# Patient Record
Sex: MALE | Race: WHITE | NOT HISPANIC OR LATINO | Employment: OTHER | URBAN - NONMETROPOLITAN AREA
[De-identification: names, ages, dates, MRNs, and addresses within clinical notes are randomized per-mention and may not be internally consistent; named-entity substitution may affect disease eponyms.]

---

## 2021-05-21 PROCEDURE — 99282 EMERGENCY DEPT VISIT SF MDM: CPT

## 2021-05-22 ENCOUNTER — HOSPITAL ENCOUNTER (EMERGENCY)
Facility: HOSPITAL | Age: 81
Discharge: HOME | End: 2021-05-22
Attending: EMERGENCY MEDICINE | Admitting: EMERGENCY MEDICINE

## 2021-05-22 VITALS
HEIGHT: 69 IN | RESPIRATION RATE: 16 BRPM | DIASTOLIC BLOOD PRESSURE: 77 MMHG | WEIGHT: 211 LBS | TEMPERATURE: 98.8 F | OXYGEN SATURATION: 100 % | HEART RATE: 71 BPM | BODY MASS INDEX: 31.25 KG/M2 | SYSTOLIC BLOOD PRESSURE: 148 MMHG

## 2021-05-22 DIAGNOSIS — R04.0 EPISTAXIS: Primary | ICD-10-CM

## 2021-05-22 LAB
ALBUMIN SERPL-MCNC: 4.25 G/DL (ref 3.5–5.2)
ALBUMIN/GLOB SERPL: 1.3 G/DL
ALP SERPL-CCNC: 59 U/L (ref 39–117)
ALT SERPL W P-5'-P-CCNC: 10 U/L (ref 1–41)
ANION GAP SERPL CALCULATED.3IONS-SCNC: 9.9 MMOL/L (ref 5–15)
APTT PPP: 37.5 SECONDS (ref 25.6–35.3)
AST SERPL-CCNC: 16 U/L (ref 1–40)
BASOPHILS # BLD AUTO: 0.03 10*3/MM3 (ref 0–0.2)
BASOPHILS NFR BLD AUTO: 0.5 % (ref 0–1.5)
BILIRUB SERPL-MCNC: 0.3 MG/DL (ref 0–1.2)
BUN SERPL-MCNC: 23 MG/DL (ref 8–23)
BUN/CREAT SERPL: 15.4 (ref 7–25)
CALCIUM SPEC-SCNC: 9.5 MG/DL (ref 8.6–10.5)
CHLORIDE SERPL-SCNC: 98 MMOL/L (ref 98–107)
CO2 SERPL-SCNC: 24.1 MMOL/L (ref 22–29)
CREAT SERPL-MCNC: 1.49 MG/DL (ref 0.76–1.27)
DEPRECATED RDW RBC AUTO: 45 FL (ref 37–54)
EOSINOPHIL # BLD AUTO: 0.17 10*3/MM3 (ref 0–0.4)
EOSINOPHIL NFR BLD AUTO: 2.7 % (ref 0.3–6.2)
ERYTHROCYTE [DISTWIDTH] IN BLOOD BY AUTOMATED COUNT: 13.1 % (ref 12.3–15.4)
GFR SERPL CREATININE-BSD FRML MDRD: 45 ML/MIN/1.73
GLOBULIN UR ELPH-MCNC: 3.2 GM/DL
GLUCOSE SERPL-MCNC: 185 MG/DL (ref 65–99)
HCT VFR BLD AUTO: 35.4 % (ref 37.5–51)
HGB BLD-MCNC: 11.2 G/DL (ref 13–17.7)
IMM GRANULOCYTES # BLD AUTO: 0.03 10*3/MM3 (ref 0–0.05)
IMM GRANULOCYTES NFR BLD AUTO: 0.5 % (ref 0–0.5)
INR PPP: 1.23 (ref 0.9–1.1)
LYMPHOCYTES # BLD AUTO: 1.61 10*3/MM3 (ref 0.7–3.1)
LYMPHOCYTES NFR BLD AUTO: 25.1 % (ref 19.6–45.3)
MCH RBC QN AUTO: 29.6 PG (ref 26.6–33)
MCHC RBC AUTO-ENTMCNC: 31.6 G/DL (ref 31.5–35.7)
MCV RBC AUTO: 93.7 FL (ref 79–97)
MONOCYTES # BLD AUTO: 0.63 10*3/MM3 (ref 0.1–0.9)
MONOCYTES NFR BLD AUTO: 9.8 % (ref 5–12)
NEUTROPHILS NFR BLD AUTO: 3.94 10*3/MM3 (ref 1.7–7)
NEUTROPHILS NFR BLD AUTO: 61.4 % (ref 42.7–76)
NRBC BLD AUTO-RTO: 0 /100 WBC (ref 0–0.2)
PLATELET # BLD AUTO: 214 10*3/MM3 (ref 140–450)
PMV BLD AUTO: 9.1 FL (ref 6–12)
POTASSIUM SERPL-SCNC: 5.2 MMOL/L (ref 3.5–5.2)
PROT SERPL-MCNC: 7.4 G/DL (ref 6–8.5)
PROTHROMBIN TIME: 15.3 SECONDS (ref 11.9–14.1)
RBC # BLD AUTO: 3.78 10*6/MM3 (ref 4.14–5.8)
SODIUM SERPL-SCNC: 132 MMOL/L (ref 136–145)
WBC # BLD AUTO: 6.41 10*3/MM3 (ref 3.4–10.8)

## 2021-05-22 PROCEDURE — 85025 COMPLETE CBC W/AUTO DIFF WBC: CPT | Performed by: EMERGENCY MEDICINE

## 2021-05-22 PROCEDURE — 36415 COLL VENOUS BLD VENIPUNCTURE: CPT

## 2021-05-22 PROCEDURE — 85730 THROMBOPLASTIN TIME PARTIAL: CPT | Performed by: EMERGENCY MEDICINE

## 2021-05-22 PROCEDURE — 85610 PROTHROMBIN TIME: CPT | Performed by: EMERGENCY MEDICINE

## 2021-05-22 PROCEDURE — 80053 COMPREHEN METABOLIC PANEL: CPT | Performed by: EMERGENCY MEDICINE

## 2021-05-22 RX ADMIN — TRANEXAMIC ACID 500 MG: 100 INJECTION, SOLUTION INTRAVENOUS at 02:30

## 2021-05-22 NOTE — ED PROVIDER NOTES
Subjective     History provided by:  Patient   used: No    Nose Bleed  Location:  R nare  Severity:  Mild  Timing:  Constant  Progression:  Improving  Chronicity:  New  Context: anticoagulants    Context: not aspirin use, not BiPAP, not bleeding disorder, not CPAP, not drug use, not elevation change, not foreign body, not home oxygen, not hypertension, not nose picking, not recent infection, not thrombocytopenia, not trauma and not weather change    Relieved by:  Nothing  Worsened by:  Nothing  Ineffective treatments:  None tried  Associated symptoms: no blood in oropharynx, no congestion, no cough, no dizziness, no facial pain, no fever, no headaches, no sinus pain, no sneezing, no sore throat and no syncope    Risk factors: no alcohol use, no allergies, no change in medication, no frequent nosebleeds, no head and neck surgery, no head and neck tumor, no intranasal steroids, no liver disease, no radiation treatment, no recent chemotherapy, no recent nasal surgery and no sinus problems        Review of Systems   Constitutional: Negative for activity change, appetite change, chills, diaphoresis, fatigue and fever.   HENT: Positive for nosebleeds. Negative for congestion, ear pain, sinus pain, sneezing and sore throat.    Eyes: Negative for redness.   Respiratory: Negative for cough, chest tightness, shortness of breath and wheezing.    Cardiovascular: Negative for chest pain, palpitations, leg swelling and syncope.   Gastrointestinal: Negative for abdominal pain, diarrhea, nausea and vomiting.   Genitourinary: Negative for dysuria and urgency.   Musculoskeletal: Negative for arthralgias, back pain, myalgias and neck pain.   Skin: Negative for pallor, rash and wound.   Neurological: Negative for dizziness, speech difficulty, weakness and headaches.   Psychiatric/Behavioral: Negative for agitation, behavioral problems, confusion and decreased concentration.   All other systems reviewed and are  negative.      No past medical history on file.    No Known Allergies    No past surgical history on file.    No family history on file.    Social History     Socioeconomic History   • Marital status: Single     Spouse name: Not on file   • Number of children: Not on file   • Years of education: Not on file   • Highest education level: Not on file           Objective   Physical Exam  Vitals and nursing note reviewed.   Constitutional:       General: He is not in acute distress.     Appearance: Normal appearance. He is well-developed. He is not toxic-appearing or diaphoretic.   HENT:      Head: Normocephalic and atraumatic.      Right Ear: External ear normal.      Left Ear: External ear normal.      Nose:      Right Nostril: Epistaxis present.      Left Nostril: No epistaxis.      Comments: Mild bleeding noted to the right nare with a clot.     Mouth/Throat:      Pharynx: No oropharyngeal exudate.      Tonsils: No tonsillar exudate.   Eyes:      General: Lids are normal.      Conjunctiva/sclera: Conjunctivae normal.      Pupils: Pupils are equal, round, and reactive to light.   Neck:      Thyroid: No thyromegaly.   Cardiovascular:      Rate and Rhythm: Normal rate and regular rhythm.      Pulses: Normal pulses.      Heart sounds: Normal heart sounds, S1 normal and S2 normal.   Pulmonary:      Effort: Pulmonary effort is normal. No tachypnea or respiratory distress.      Breath sounds: Normal breath sounds. No decreased breath sounds, wheezing or rales.   Chest:      Chest wall: No tenderness.   Abdominal:      General: Bowel sounds are normal. There is no distension.      Palpations: Abdomen is soft.      Tenderness: There is no abdominal tenderness. There is no guarding or rebound.   Musculoskeletal:         General: No tenderness or deformity. Normal range of motion.      Cervical back: Full passive range of motion without pain, normal range of motion and neck supple.   Lymphadenopathy:      Cervical: No cervical  adenopathy.   Skin:     General: Skin is warm and dry.      Coloration: Skin is not pale.      Findings: No erythema or rash.   Neurological:      Mental Status: He is alert and oriented to person, place, and time.      GCS: GCS eye subscore is 4. GCS verbal subscore is 5. GCS motor subscore is 6.      Cranial Nerves: No cranial nerve deficit.      Sensory: No sensory deficit.   Psychiatric:         Speech: Speech normal.         Behavior: Behavior normal.         Thought Content: Thought content normal.         Judgment: Judgment normal.         Epistaxis Management    Date/Time: 5/26/2021 7:23 PM  Performed by: Vinicius Nunes MD  Authorized by: Vinicius Nunes MD     Consent:     Consent obtained:  Verbal and written    Consent given by:  Patient    Risks discussed:  Bleeding, infection, nasal injury and pain    Alternatives discussed:  No treatment  Universal protocol:     Procedure explained and questions answered to patient or proxy's satisfaction: yes      Relevant documents present and verified: yes      Test results available and properly labeled: yes      Imaging studies available: yes      Required blood products, implants, devices, and special equipment available: yes      Site/side marked: yes      Time out called: yes      Patient identity confirmed:  Verbally with patient and arm band  Anesthesia (see MAR for exact dosages):     Anesthesia method:  None  Procedure details:     Treatment site:  R anterior    Treatment method:  Anterior pack and thrombin    Treatment complexity:  Limited    Treatment episode: initial    Post-procedure details:     Assessment:  Bleeding stopped    Patient tolerance of procedure:  Tolerated well, no immediate complications               ED Course                                           MDM  Number of Diagnoses or Management Options  Epistaxis: new and requires workup     Amount and/or Complexity of Data Reviewed  Clinical lab tests: reviewed and  ordered  Review and summarize past medical records: yes  Independent visualization of images, tracings, or specimens: yes    Risk of Complications, Morbidity, and/or Mortality  Presenting problems: moderate  Diagnostic procedures: moderate  Management options: moderate    Patient Progress  Patient progress: stable      Final diagnoses:   Epistaxis       ED Disposition  ED Disposition     ED Disposition Condition Comment    Discharge Stable           [facility]  [address] [place] [id number] [phone]  Schedule an appointment as soon as possible for a visit in 1 day  EVALUATE         Medication List      No changes were made to your prescriptions during this visit.          Vinicius Nunes MD  05/22/21 0503       Vinicius Nunes MD  05/26/21 9693

## 2023-08-31 ENCOUNTER — HOSPITAL ENCOUNTER (EMERGENCY)
Facility: HOSPITAL | Age: 83
Discharge: OTHER INSTITUTION | End: 2023-09-01
Attending: STUDENT IN AN ORGANIZED HEALTH CARE EDUCATION/TRAINING PROGRAM
Payer: OTHER GOVERNMENT

## 2023-08-31 DIAGNOSIS — I50.9 ACUTE ON CHRONIC CONGESTIVE HEART FAILURE, UNSPECIFIED HEART FAILURE TYPE: ICD-10-CM

## 2023-08-31 DIAGNOSIS — J96.01 ACUTE HYPOXEMIC RESPIRATORY FAILURE: Primary | ICD-10-CM

## 2023-08-31 LAB
A-A DO2: 51.9 MMHG (ref 0–300)
ARTERIAL PATENCY WRIST A: POSITIVE
ATMOSPHERIC PRESS: 729 MMHG
BASE EXCESS BLDA CALC-SCNC: -1.3 MMOL/L (ref 0–2)
BASOPHILS # BLD AUTO: 0.02 10*3/MM3 (ref 0–0.2)
BASOPHILS NFR BLD AUTO: 0.2 % (ref 0–1.5)
BDY SITE: ABNORMAL
CO2 BLDA-SCNC: 24.7 MMOL/L (ref 22–33)
COHGB MFR BLD: 1.6 % (ref 0–5)
DEPRECATED RDW RBC AUTO: 51 FL (ref 37–54)
EOSINOPHIL # BLD AUTO: 0.06 10*3/MM3 (ref 0–0.4)
EOSINOPHIL NFR BLD AUTO: 0.6 % (ref 0.3–6.2)
ERYTHROCYTE [DISTWIDTH] IN BLOOD BY AUTOMATED COUNT: 14.5 % (ref 12.3–15.4)
HCO3 BLDA-SCNC: 23.6 MMOL/L (ref 20–26)
HCT VFR BLD AUTO: 32.6 % (ref 37.5–51)
HCT VFR BLD CALC: 31.4 % (ref 38–51)
HGB BLD-MCNC: 10.3 G/DL (ref 13–17.7)
HGB BLDA-MCNC: 10.2 G/DL (ref 14–18)
IMM GRANULOCYTES # BLD AUTO: 0.04 10*3/MM3 (ref 0–0.05)
IMM GRANULOCYTES NFR BLD AUTO: 0.4 % (ref 0–0.5)
INHALED O2 CONCENTRATION: 21 %
LYMPHOCYTES # BLD AUTO: 0.81 10*3/MM3 (ref 0.7–3.1)
LYMPHOCYTES NFR BLD AUTO: 7.9 % (ref 19.6–45.3)
Lab: ABNORMAL
Lab: ABNORMAL
MCH RBC QN AUTO: 30.6 PG (ref 26.6–33)
MCHC RBC AUTO-ENTMCNC: 31.6 G/DL (ref 31.5–35.7)
MCV RBC AUTO: 96.7 FL (ref 79–97)
METHGB BLD QL: 0.1 % (ref 0–3)
MODALITY: ABNORMAL
MONOCYTES # BLD AUTO: 0.59 10*3/MM3 (ref 0.1–0.9)
MONOCYTES NFR BLD AUTO: 5.7 % (ref 5–12)
NEUTROPHILS NFR BLD AUTO: 8.77 10*3/MM3 (ref 1.7–7)
NEUTROPHILS NFR BLD AUTO: 85.2 % (ref 42.7–76)
NOTIFIED BY: ABNORMAL
NOTIFIED WHO: ABNORMAL
NRBC BLD AUTO-RTO: 0 /100 WBC (ref 0–0.2)
OXYHGB MFR BLDV: 82.4 % (ref 94–99)
PCO2 BLDA: 38.9 MM HG (ref 35–45)
PCO2 TEMP ADJ BLD: ABNORMAL MM[HG]
PH BLDA: 7.39 PH UNITS (ref 7.35–7.45)
PH, TEMP CORRECTED: ABNORMAL
PLATELET # BLD AUTO: 231 10*3/MM3 (ref 140–450)
PMV BLD AUTO: 9.1 FL (ref 6–12)
PO2 BLDA: 47.3 MM HG (ref 83–108)
PO2 TEMP ADJ BLD: ABNORMAL MM[HG]
RBC # BLD AUTO: 3.37 10*6/MM3 (ref 4.14–5.8)
SAO2 % BLDCOA: 83.8 % (ref 94–99)
VENTILATOR MODE: ABNORMAL
WBC NRBC COR # BLD: 10.29 10*3/MM3 (ref 3.4–10.8)

## 2023-08-31 PROCEDURE — 82805 BLOOD GASES W/O2 SATURATION: CPT

## 2023-08-31 PROCEDURE — 93005 ELECTROCARDIOGRAM TRACING: CPT | Performed by: STUDENT IN AN ORGANIZED HEALTH CARE EDUCATION/TRAINING PROGRAM

## 2023-08-31 PROCEDURE — 36600 WITHDRAWAL OF ARTERIAL BLOOD: CPT

## 2023-08-31 PROCEDURE — 80053 COMPREHEN METABOLIC PANEL: CPT | Performed by: STUDENT IN AN ORGANIZED HEALTH CARE EDUCATION/TRAINING PROGRAM

## 2023-08-31 PROCEDURE — 85025 COMPLETE CBC W/AUTO DIFF WBC: CPT | Performed by: STUDENT IN AN ORGANIZED HEALTH CARE EDUCATION/TRAINING PROGRAM

## 2023-08-31 PROCEDURE — 84484 ASSAY OF TROPONIN QUANT: CPT | Performed by: STUDENT IN AN ORGANIZED HEALTH CARE EDUCATION/TRAINING PROGRAM

## 2023-08-31 PROCEDURE — 85610 PROTHROMBIN TIME: CPT | Performed by: STUDENT IN AN ORGANIZED HEALTH CARE EDUCATION/TRAINING PROGRAM

## 2023-08-31 PROCEDURE — 82550 ASSAY OF CK (CPK): CPT | Performed by: STUDENT IN AN ORGANIZED HEALTH CARE EDUCATION/TRAINING PROGRAM

## 2023-08-31 PROCEDURE — 83880 ASSAY OF NATRIURETIC PEPTIDE: CPT | Performed by: STUDENT IN AN ORGANIZED HEALTH CARE EDUCATION/TRAINING PROGRAM

## 2023-08-31 PROCEDURE — 82375 ASSAY CARBOXYHB QUANT: CPT

## 2023-08-31 PROCEDURE — 83605 ASSAY OF LACTIC ACID: CPT | Performed by: STUDENT IN AN ORGANIZED HEALTH CARE EDUCATION/TRAINING PROGRAM

## 2023-08-31 PROCEDURE — 99285 EMERGENCY DEPT VISIT HI MDM: CPT

## 2023-08-31 PROCEDURE — 83050 HGB METHEMOGLOBIN QUAN: CPT

## 2023-08-31 PROCEDURE — 36415 COLL VENOUS BLD VENIPUNCTURE: CPT

## 2023-08-31 PROCEDURE — 94799 UNLISTED PULMONARY SVC/PX: CPT

## 2023-08-31 PROCEDURE — 86140 C-REACTIVE PROTEIN: CPT | Performed by: STUDENT IN AN ORGANIZED HEALTH CARE EDUCATION/TRAINING PROGRAM

## 2023-08-31 RX ORDER — SODIUM CHLORIDE 0.9 % (FLUSH) 0.9 %
10 SYRINGE (ML) INJECTION AS NEEDED
Status: DISCONTINUED | OUTPATIENT
Start: 2023-08-31 | End: 2023-09-01 | Stop reason: HOSPADM

## 2023-09-01 ENCOUNTER — APPOINTMENT (OUTPATIENT)
Dept: CT IMAGING | Facility: HOSPITAL | Age: 83
End: 2023-09-01
Payer: OTHER GOVERNMENT

## 2023-09-01 ENCOUNTER — APPOINTMENT (OUTPATIENT)
Dept: GENERAL RADIOLOGY | Facility: HOSPITAL | Age: 83
End: 2023-09-01
Payer: OTHER GOVERNMENT

## 2023-09-01 VITALS
RESPIRATION RATE: 24 BRPM | HEART RATE: 94 BPM | WEIGHT: 220 LBS | BODY MASS INDEX: 32.58 KG/M2 | TEMPERATURE: 98.7 F | SYSTOLIC BLOOD PRESSURE: 123 MMHG | OXYGEN SATURATION: 95 % | HEIGHT: 69 IN | DIASTOLIC BLOOD PRESSURE: 85 MMHG

## 2023-09-01 LAB
ALBUMIN SERPL-MCNC: 4.2 G/DL (ref 3.5–5.2)
ALBUMIN/GLOB SERPL: 1.1 G/DL
ALP SERPL-CCNC: 81 U/L (ref 39–117)
ALT SERPL W P-5'-P-CCNC: 11 U/L (ref 1–41)
AMPHET+METHAMPHET UR QL: NEGATIVE
AMPHETAMINES UR QL: NEGATIVE
ANION GAP SERPL CALCULATED.3IONS-SCNC: 13.1 MMOL/L (ref 5–15)
AST SERPL-CCNC: 16 U/L (ref 1–40)
BARBITURATES UR QL SCN: NEGATIVE
BENZODIAZ UR QL SCN: NEGATIVE
BILIRUB SERPL-MCNC: 0.6 MG/DL (ref 0–1.2)
BUN SERPL-MCNC: 13 MG/DL (ref 8–23)
BUN/CREAT SERPL: 15.7 (ref 7–25)
BUPRENORPHINE SERPL-MCNC: NEGATIVE NG/ML
CALCIUM SPEC-SCNC: 9.2 MG/DL (ref 8.6–10.5)
CANNABINOIDS SERPL QL: NEGATIVE
CHLORIDE SERPL-SCNC: 98 MMOL/L (ref 98–107)
CK SERPL-CCNC: 35 U/L (ref 20–200)
CO2 SERPL-SCNC: 22.9 MMOL/L (ref 22–29)
COCAINE UR QL: NEGATIVE
CREAT SERPL-MCNC: 0.83 MG/DL (ref 0.76–1.27)
CRP SERPL-MCNC: 6.03 MG/DL (ref 0–0.5)
D-LACTATE SERPL-SCNC: 1.4 MMOL/L (ref 0.5–2)
EGFRCR SERPLBLD CKD-EPI 2021: 87.4 ML/MIN/1.73
FENTANYL UR-MCNC: NEGATIVE NG/ML
GLOBULIN UR ELPH-MCNC: 4 GM/DL
GLUCOSE SERPL-MCNC: 281 MG/DL (ref 65–99)
HOLD SPECIMEN: NORMAL
HOLD SPECIMEN: NORMAL
INR PPP: 1.2 (ref 0.9–1.1)
METHADONE UR QL SCN: NEGATIVE
NT-PROBNP SERPL-MCNC: 2818 PG/ML (ref 0–1800)
OPIATES UR QL: NEGATIVE
OXYCODONE UR QL SCN: NEGATIVE
PCP UR QL SCN: NEGATIVE
POTASSIUM SERPL-SCNC: 4.6 MMOL/L (ref 3.5–5.2)
PROCALCITONIN SERPL-MCNC: 0.03 NG/ML (ref 0–0.25)
PROPOXYPH UR QL: NEGATIVE
PROT SERPL-MCNC: 8.2 G/DL (ref 6–8.5)
PROTHROMBIN TIME: 15.8 SECONDS (ref 12.1–14.7)
QT INTERVAL: 404 MS
QTC INTERVAL: 510 MS
SARS-COV-2 RNA RESP QL NAA+PROBE: NOT DETECTED
SODIUM SERPL-SCNC: 134 MMOL/L (ref 136–145)
TRICYCLICS UR QL SCN: NEGATIVE
TROPONIN T SERPL HS-MCNC: 9 NG/L
WHOLE BLOOD HOLD COAG: NORMAL
WHOLE BLOOD HOLD SPECIMEN: NORMAL

## 2023-09-01 PROCEDURE — 96365 THER/PROPH/DIAG IV INF INIT: CPT

## 2023-09-01 PROCEDURE — 25510000001 IOPAMIDOL 61 % SOLUTION: Performed by: STUDENT IN AN ORGANIZED HEALTH CARE EDUCATION/TRAINING PROGRAM

## 2023-09-01 PROCEDURE — 25010000002 FUROSEMIDE PER 20 MG: Performed by: STUDENT IN AN ORGANIZED HEALTH CARE EDUCATION/TRAINING PROGRAM

## 2023-09-01 PROCEDURE — 87635 SARS-COV-2 COVID-19 AMP PRB: CPT | Performed by: STUDENT IN AN ORGANIZED HEALTH CARE EDUCATION/TRAINING PROGRAM

## 2023-09-01 PROCEDURE — 84145 PROCALCITONIN (PCT): CPT | Performed by: STUDENT IN AN ORGANIZED HEALTH CARE EDUCATION/TRAINING PROGRAM

## 2023-09-01 PROCEDURE — 96375 TX/PRO/DX INJ NEW DRUG ADDON: CPT

## 2023-09-01 PROCEDURE — 71275 CT ANGIOGRAPHY CHEST: CPT

## 2023-09-01 PROCEDURE — 87040 BLOOD CULTURE FOR BACTERIA: CPT | Performed by: STUDENT IN AN ORGANIZED HEALTH CARE EDUCATION/TRAINING PROGRAM

## 2023-09-01 PROCEDURE — 71045 X-RAY EXAM CHEST 1 VIEW: CPT

## 2023-09-01 PROCEDURE — 25010000002 PIPERACILLIN SOD-TAZOBACTAM PER 1 G: Performed by: STUDENT IN AN ORGANIZED HEALTH CARE EDUCATION/TRAINING PROGRAM

## 2023-09-01 PROCEDURE — 80307 DRUG TEST PRSMV CHEM ANLYZR: CPT | Performed by: STUDENT IN AN ORGANIZED HEALTH CARE EDUCATION/TRAINING PROGRAM

## 2023-09-01 PROCEDURE — 25010000002 VANCOMYCIN 5 G RECONSTITUTED SOLUTION: Performed by: STUDENT IN AN ORGANIZED HEALTH CARE EDUCATION/TRAINING PROGRAM

## 2023-09-01 PROCEDURE — 96367 TX/PROPH/DG ADDL SEQ IV INF: CPT

## 2023-09-01 RX ORDER — FUROSEMIDE 10 MG/ML
80 INJECTION INTRAMUSCULAR; INTRAVENOUS ONCE
Status: COMPLETED | OUTPATIENT
Start: 2023-09-01 | End: 2023-09-01

## 2023-09-01 RX ORDER — VANCOMYCIN 2 GRAM/500 ML IN 0.9 % SODIUM CHLORIDE INTRAVENOUS
20 ONCE
Status: COMPLETED | OUTPATIENT
Start: 2023-09-01 | End: 2023-09-01

## 2023-09-01 RX ADMIN — FUROSEMIDE 80 MG: 10 INJECTION, SOLUTION INTRAMUSCULAR; INTRAVENOUS at 00:27

## 2023-09-01 RX ADMIN — VANCOMYCIN HYDROCHLORIDE 2000 MG: 5 INJECTION, POWDER, LYOPHILIZED, FOR SOLUTION INTRAVENOUS at 02:01

## 2023-09-01 RX ADMIN — IOPAMIDOL 80 ML: 612 INJECTION, SOLUTION INTRAVENOUS at 00:54

## 2023-09-01 RX ADMIN — PIPERACILLIN SODIUM AND TAZOBACTAM SODIUM 4.5 G: 4; .5 INJECTION, POWDER, LYOPHILIZED, FOR SOLUTION INTRAVENOUS at 01:31

## 2023-09-01 NOTE — ED NOTES
Received call from [facility] with Bed assignment. Patient going to Room 573 on the 5th floor.
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
## 2023-09-01 NOTE — ED NOTES
Called [facility] requesting [facility]. Dr. Cuevas speaking with transferring  physician at this time

## 2023-09-01 NOTE — ED NOTES
Called [facility] to requesting [employer] truck to transport patent to [facility] in [facility] at this time. Accepted transfer.

## 2023-09-01 NOTE — ED NOTES
Report phoned to MARTINEZ Garcias at [facility]. Patient for admission to 5th floor, Room 573.

## 2023-09-06 LAB
BACTERIA SPEC AEROBE CULT: NORMAL
BACTERIA SPEC AEROBE CULT: NORMAL

## 2024-08-26 ENCOUNTER — APPOINTMENT (OUTPATIENT)
Dept: CT IMAGING | Facility: HOSPITAL | Age: 84
End: 2024-08-26
Payer: OTHER GOVERNMENT

## 2024-08-26 ENCOUNTER — HOSPITAL ENCOUNTER (EMERGENCY)
Facility: HOSPITAL | Age: 84
Discharge: OTHER INSTITUTION | End: 2024-08-26
Attending: STUDENT IN AN ORGANIZED HEALTH CARE EDUCATION/TRAINING PROGRAM
Payer: OTHER GOVERNMENT

## 2024-08-26 ENCOUNTER — APPOINTMENT (OUTPATIENT)
Dept: GENERAL RADIOLOGY | Facility: HOSPITAL | Age: 84
End: 2024-08-26
Payer: OTHER GOVERNMENT

## 2024-08-26 VITALS
WEIGHT: 200 LBS | DIASTOLIC BLOOD PRESSURE: 89 MMHG | BODY MASS INDEX: 29.62 KG/M2 | SYSTOLIC BLOOD PRESSURE: 153 MMHG | HEART RATE: 89 BPM | OXYGEN SATURATION: 93 % | HEIGHT: 69 IN | TEMPERATURE: 97.8 F | RESPIRATION RATE: 20 BRPM

## 2024-08-26 DIAGNOSIS — R91.8 LUNG MASS: ICD-10-CM

## 2024-08-26 DIAGNOSIS — J90 PLEURAL EFFUSION, LEFT: ICD-10-CM

## 2024-08-26 DIAGNOSIS — U07.1 COVID-19: Primary | ICD-10-CM

## 2024-08-26 DIAGNOSIS — E87.20 LACTIC ACIDOSIS: ICD-10-CM

## 2024-08-26 DIAGNOSIS — J18.9 PNEUMONIA OF LEFT LOWER LOBE DUE TO INFECTIOUS ORGANISM: ICD-10-CM

## 2024-08-26 LAB
A-A DO2: 36.1 MMHG (ref 0–300)
ALBUMIN SERPL-MCNC: 3.2 G/DL (ref 3.5–5.2)
ALBUMIN/GLOB SERPL: 0.7 G/DL
ALP SERPL-CCNC: 104 U/L (ref 39–117)
ALT SERPL W P-5'-P-CCNC: 51 U/L (ref 1–41)
ANION GAP SERPL CALCULATED.3IONS-SCNC: 15.5 MMOL/L (ref 5–15)
ARTERIAL PATENCY WRIST A: ABNORMAL
AST SERPL-CCNC: 70 U/L (ref 1–40)
ATMOSPHERIC PRESS: 731 MMHG
BASE EXCESS BLDA CALC-SCNC: -2.4 MMOL/L (ref 0–2)
BASOPHILS # BLD AUTO: 0.01 10*3/MM3 (ref 0–0.2)
BASOPHILS NFR BLD AUTO: 0.1 % (ref 0–1.5)
BDY SITE: ABNORMAL
BILIRUB SERPL-MCNC: 0.4 MG/DL (ref 0–1.2)
BUN SERPL-MCNC: 17 MG/DL (ref 8–23)
BUN/CREAT SERPL: 18.5 (ref 7–25)
CALCIUM SPEC-SCNC: 8.8 MG/DL (ref 8.6–10.5)
CHLORIDE SERPL-SCNC: 89 MMOL/L (ref 98–107)
CO2 BLDA-SCNC: 23 MMOL/L (ref 22–33)
CO2 SERPL-SCNC: 20.5 MMOL/L (ref 22–29)
COHGB MFR BLD: 1.4 % (ref 0–5)
CREAT SERPL-MCNC: 0.92 MG/DL (ref 0.76–1.27)
CRP SERPL-MCNC: 14.47 MG/DL (ref 0–0.5)
D-LACTATE SERPL-SCNC: 4.2 MMOL/L (ref 0.5–2)
D-LACTATE SERPL-SCNC: 4.5 MMOL/L (ref 0.5–2)
DEPRECATED RDW RBC AUTO: 61.5 FL (ref 37–54)
EGFRCR SERPLBLD CKD-EPI 2021: 82.5 ML/MIN/1.73
EOSINOPHIL # BLD AUTO: 0.01 10*3/MM3 (ref 0–0.4)
EOSINOPHIL NFR BLD AUTO: 0.1 % (ref 0.3–6.2)
ERYTHROCYTE [DISTWIDTH] IN BLOOD BY AUTOMATED COUNT: 19.5 % (ref 12.3–15.4)
FLUAV RNA RESP QL NAA+PROBE: NOT DETECTED
FLUBV RNA RESP QL NAA+PROBE: NOT DETECTED
GLOBULIN UR ELPH-MCNC: 4.3 GM/DL
GLUCOSE SERPL-MCNC: 363 MG/DL (ref 65–99)
HAV IGM SERPL QL IA: NORMAL
HBV CORE IGM SERPL QL IA: NORMAL
HBV SURFACE AG SERPL QL IA: NORMAL
HCO3 BLDA-SCNC: 21.9 MMOL/L (ref 20–26)
HCT VFR BLD AUTO: 28 % (ref 37.5–51)
HCT VFR BLD CALC: 26.9 % (ref 38–51)
HCV AB SER QL: NORMAL
HGB BLD-MCNC: 8.4 G/DL (ref 13–17.7)
HGB BLDA-MCNC: 8.8 G/DL (ref 14–18)
HOLD SPECIMEN: NORMAL
HOLD SPECIMEN: NORMAL
IMM GRANULOCYTES # BLD AUTO: 0.1 10*3/MM3 (ref 0–0.05)
IMM GRANULOCYTES NFR BLD AUTO: 0.9 % (ref 0–0.5)
INHALED O2 CONCENTRATION: 21 %
INR PPP: 1.16 (ref 0.9–1.1)
LDH SERPL-CCNC: 258 U/L (ref 135–225)
LYMPHOCYTES # BLD AUTO: 0.26 10*3/MM3 (ref 0.7–3.1)
LYMPHOCYTES NFR BLD AUTO: 2.3 % (ref 19.6–45.3)
Lab: ABNORMAL
MCH RBC QN AUTO: 26.2 PG (ref 26.6–33)
MCHC RBC AUTO-ENTMCNC: 30 G/DL (ref 31.5–35.7)
MCV RBC AUTO: 87.2 FL (ref 79–97)
METHGB BLD QL: 0.2 % (ref 0–3)
MODALITY: ABNORMAL
MONOCYTES # BLD AUTO: 0.74 10*3/MM3 (ref 0.1–0.9)
MONOCYTES NFR BLD AUTO: 6.4 % (ref 5–12)
NEUTROPHILS NFR BLD AUTO: 10.4 10*3/MM3 (ref 1.7–7)
NEUTROPHILS NFR BLD AUTO: 90.2 % (ref 42.7–76)
NRBC BLD AUTO-RTO: 0 /100 WBC (ref 0–0.2)
NT-PROBNP SERPL-MCNC: 9644 PG/ML (ref 0–1800)
OXYHGB MFR BLDV: 91.7 % (ref 94–99)
PCO2 BLDA: 35.1 MM HG (ref 35–45)
PCO2 TEMP ADJ BLD: ABNORMAL MM[HG]
PH BLDA: 7.4 PH UNITS (ref 7.35–7.45)
PH, TEMP CORRECTED: ABNORMAL
PLATELET # BLD AUTO: 287 10*3/MM3 (ref 140–450)
PMV BLD AUTO: 8.7 FL (ref 6–12)
PO2 BLDA: 67.9 MM HG (ref 83–108)
PO2 TEMP ADJ BLD: ABNORMAL MM[HG]
POTASSIUM SERPL-SCNC: 4.7 MMOL/L (ref 3.5–5.2)
PROCALCITONIN SERPL-MCNC: 0.15 NG/ML (ref 0–0.25)
PROT SERPL-MCNC: 7.5 G/DL (ref 6–8.5)
PROTHROMBIN TIME: 14.9 SECONDS (ref 12.1–14.7)
QT INTERVAL: 392 MS
QTC INTERVAL: 510 MS
RBC # BLD AUTO: 3.21 10*6/MM3 (ref 4.14–5.8)
SAO2 % BLDCOA: 93.2 % (ref 94–99)
SARS-COV-2 RNA RESP QL NAA+PROBE: DETECTED
SODIUM SERPL-SCNC: 125 MMOL/L (ref 136–145)
TROPONIN T SERPL HS-MCNC: 29 NG/L
URATE SERPL-MCNC: 3.6 MG/DL (ref 3.4–7)
VENTILATOR MODE: ABNORMAL
WBC NRBC COR # BLD AUTO: 11.52 10*3/MM3 (ref 3.4–10.8)
WHOLE BLOOD HOLD COAG: NORMAL
WHOLE BLOOD HOLD SPECIMEN: NORMAL

## 2024-08-26 PROCEDURE — 71275 CT ANGIOGRAPHY CHEST: CPT | Performed by: RADIOLOGY

## 2024-08-26 PROCEDURE — 87636 SARSCOV2 & INF A&B AMP PRB: CPT | Performed by: STUDENT IN AN ORGANIZED HEALTH CARE EDUCATION/TRAINING PROGRAM

## 2024-08-26 PROCEDURE — 87040 BLOOD CULTURE FOR BACTERIA: CPT | Performed by: STUDENT IN AN ORGANIZED HEALTH CARE EDUCATION/TRAINING PROGRAM

## 2024-08-26 PROCEDURE — 99285 EMERGENCY DEPT VISIT HI MDM: CPT

## 2024-08-26 PROCEDURE — 25510000001 IOPAMIDOL PER 1 ML: Performed by: STUDENT IN AN ORGANIZED HEALTH CARE EDUCATION/TRAINING PROGRAM

## 2024-08-26 PROCEDURE — 96365 THER/PROPH/DIAG IV INF INIT: CPT

## 2024-08-26 PROCEDURE — 36600 WITHDRAWAL OF ARTERIAL BLOOD: CPT

## 2024-08-26 PROCEDURE — 36415 COLL VENOUS BLD VENIPUNCTURE: CPT

## 2024-08-26 PROCEDURE — 83880 ASSAY OF NATRIURETIC PEPTIDE: CPT | Performed by: STUDENT IN AN ORGANIZED HEALTH CARE EDUCATION/TRAINING PROGRAM

## 2024-08-26 PROCEDURE — 80053 COMPREHEN METABOLIC PANEL: CPT | Performed by: STUDENT IN AN ORGANIZED HEALTH CARE EDUCATION/TRAINING PROGRAM

## 2024-08-26 PROCEDURE — 84145 PROCALCITONIN (PCT): CPT | Performed by: STUDENT IN AN ORGANIZED HEALTH CARE EDUCATION/TRAINING PROGRAM

## 2024-08-26 PROCEDURE — 71045 X-RAY EXAM CHEST 1 VIEW: CPT

## 2024-08-26 PROCEDURE — 86140 C-REACTIVE PROTEIN: CPT | Performed by: STUDENT IN AN ORGANIZED HEALTH CARE EDUCATION/TRAINING PROGRAM

## 2024-08-26 PROCEDURE — 84550 ASSAY OF BLOOD/URIC ACID: CPT | Performed by: STUDENT IN AN ORGANIZED HEALTH CARE EDUCATION/TRAINING PROGRAM

## 2024-08-26 PROCEDURE — 71275 CT ANGIOGRAPHY CHEST: CPT

## 2024-08-26 PROCEDURE — 83615 LACTATE (LD) (LDH) ENZYME: CPT | Performed by: STUDENT IN AN ORGANIZED HEALTH CARE EDUCATION/TRAINING PROGRAM

## 2024-08-26 PROCEDURE — 82805 BLOOD GASES W/O2 SATURATION: CPT

## 2024-08-26 PROCEDURE — 86606 ASPERGILLUS ANTIBODY: CPT | Performed by: STUDENT IN AN ORGANIZED HEALTH CARE EDUCATION/TRAINING PROGRAM

## 2024-08-26 PROCEDURE — 71045 X-RAY EXAM CHEST 1 VIEW: CPT | Performed by: RADIOLOGY

## 2024-08-26 PROCEDURE — 82375 ASSAY CARBOXYHB QUANT: CPT

## 2024-08-26 PROCEDURE — 83605 ASSAY OF LACTIC ACID: CPT | Performed by: STUDENT IN AN ORGANIZED HEALTH CARE EDUCATION/TRAINING PROGRAM

## 2024-08-26 PROCEDURE — 96375 TX/PRO/DX INJ NEW DRUG ADDON: CPT

## 2024-08-26 PROCEDURE — 80074 ACUTE HEPATITIS PANEL: CPT | Performed by: STUDENT IN AN ORGANIZED HEALTH CARE EDUCATION/TRAINING PROGRAM

## 2024-08-26 PROCEDURE — 25010000002 DEXAMETHASONE SODIUM PHOSPHATE 10 MG/ML SOLUTION: Performed by: STUDENT IN AN ORGANIZED HEALTH CARE EDUCATION/TRAINING PROGRAM

## 2024-08-26 PROCEDURE — 86480 TB TEST CELL IMMUN MEASURE: CPT | Performed by: STUDENT IN AN ORGANIZED HEALTH CARE EDUCATION/TRAINING PROGRAM

## 2024-08-26 PROCEDURE — 85610 PROTHROMBIN TIME: CPT | Performed by: STUDENT IN AN ORGANIZED HEALTH CARE EDUCATION/TRAINING PROGRAM

## 2024-08-26 PROCEDURE — 25010000002 CEFTRIAXONE PER 250 MG: Performed by: STUDENT IN AN ORGANIZED HEALTH CARE EDUCATION/TRAINING PROGRAM

## 2024-08-26 PROCEDURE — 84484 ASSAY OF TROPONIN QUANT: CPT | Performed by: STUDENT IN AN ORGANIZED HEALTH CARE EDUCATION/TRAINING PROGRAM

## 2024-08-26 PROCEDURE — 85025 COMPLETE CBC W/AUTO DIFF WBC: CPT | Performed by: STUDENT IN AN ORGANIZED HEALTH CARE EDUCATION/TRAINING PROGRAM

## 2024-08-26 PROCEDURE — 83050 HGB METHEMOGLOBIN QUAN: CPT

## 2024-08-26 PROCEDURE — 93005 ELECTROCARDIOGRAM TRACING: CPT | Performed by: STUDENT IN AN ORGANIZED HEALTH CARE EDUCATION/TRAINING PROGRAM

## 2024-08-26 RX ORDER — SODIUM CHLORIDE 0.9 % (FLUSH) 0.9 %
10 SYRINGE (ML) INJECTION AS NEEDED
Status: DISCONTINUED | OUTPATIENT
Start: 2024-08-26 | End: 2024-08-26 | Stop reason: HOSPADM

## 2024-08-26 RX ORDER — DOXYCYCLINE 100 MG/1
100 CAPSULE ORAL ONCE
Status: COMPLETED | OUTPATIENT
Start: 2024-08-26 | End: 2024-08-26

## 2024-08-26 RX ORDER — OXYCODONE AND ACETAMINOPHEN 5; 325 MG/1; MG/1
1 TABLET ORAL ONCE
Status: COMPLETED | OUTPATIENT
Start: 2024-08-26 | End: 2024-08-26

## 2024-08-26 RX ORDER — DEXAMETHASONE SODIUM PHOSPHATE 10 MG/ML
10 INJECTION, SOLUTION INTRAMUSCULAR; INTRAVENOUS ONCE
Status: COMPLETED | OUTPATIENT
Start: 2024-08-26 | End: 2024-08-26

## 2024-08-26 RX ORDER — IOPAMIDOL 755 MG/ML
100 INJECTION, SOLUTION INTRAVASCULAR
Status: COMPLETED | OUTPATIENT
Start: 2024-08-26 | End: 2024-08-26

## 2024-08-26 RX ADMIN — IOPAMIDOL 90 ML: 755 INJECTION, SOLUTION INTRAVENOUS at 16:43

## 2024-08-26 RX ADMIN — SODIUM CHLORIDE 1000 MG: 9 INJECTION, SOLUTION INTRAVENOUS at 16:15

## 2024-08-26 RX ADMIN — NIRMATRELVIR AND RITONAVIR 3 TABLET: KIT at 20:04

## 2024-08-26 RX ADMIN — DEXAMETHASONE SODIUM PHOSPHATE 10 MG: 10 INJECTION INTRAMUSCULAR; INTRAVENOUS at 16:15

## 2024-08-26 RX ADMIN — DOXYCYCLINE 100 MG: 100 CAPSULE ORAL at 16:15

## 2024-08-26 RX ADMIN — OXYCODONE HYDROCHLORIDE AND ACETAMINOPHEN 1 TABLET: 5; 325 TABLET ORAL at 18:21

## 2024-08-26 NOTE — ED NOTES
[facility] said [facility] run ahead of us but they would be next on the list. Made Lead aware

## 2024-08-26 NOTE — ED PROVIDER NOTES
Subjective   History of Present Illness  Patient is an 84-year-old male with history significant for skin cancer of the face/nose, A-fib on Eliquis (last dose Saturday), and known left lobe pneumonia/lesion who comes to the ER with reports of shortness of breath and weakness.  [facility] and [facility] with biopsy tomorrow.  He reports fatigue, lack of appetite over the past week.  He reports nonproductive cough and occasional nausea.  He thinks he has lost weight but is not sure.  He notes lower extremity edema that is been worse over the past 3 weeks.  He notes a raspy voice has been present since the end of July.  He used to smoke but stopped about 15 years ago.  Denies any fevers or chills or other symptoms.      Review of Systems   Constitutional:  Positive for fatigue. Negative for chills and fever.   HENT:  Positive for voice change. Negative for congestion, sinus pain and sore throat.    Respiratory:  Positive for cough and shortness of breath. Negative for chest tightness and wheezing.    Cardiovascular:  Positive for leg swelling. Negative for chest pain and palpitations.   Gastrointestinal:  Negative for abdominal pain, constipation, diarrhea, nausea and vomiting.   Genitourinary:  Negative for dysuria, frequency, hematuria and urgency.   Musculoskeletal:  Negative for arthralgias and myalgias.   Neurological:  Positive for weakness. Negative for dizziness, numbness and headaches.   Psychiatric/Behavioral:  Negative for confusion.        No past medical history on file.    No Known Allergies    No past surgical history on file.    No family history on file.    Social History     Socioeconomic History    Marital status: Single           Objective   Physical Exam  Vitals and nursing note reviewed.   Constitutional:       General: He is not in acute distress.     Appearance: He is well-developed and normal weight. He is not ill-appearing.   HENT:       Head: Normocephalic.      Comments: Deformity of the nasal bridge from prior skin cancer     Mouth/Throat:      Mouth: Mucous membranes are moist.      Pharynx: Oropharynx is clear.   Eyes:      Extraocular Movements: Extraocular movements intact.      Pupils: Pupils are equal, round, and reactive to light.   Neck:      Vascular: No JVD.   Cardiovascular:      Rate and Rhythm: Normal rate and regular rhythm.      Heart sounds: No murmur heard.     No friction rub. No gallop.   Pulmonary:      Effort: Pulmonary effort is normal. No tachypnea.      Breath sounds: Normal breath sounds. Examination of the right-upper field reveals wheezing. Examination of the left-upper field reveals wheezing. Examination of the right-middle field reveals wheezing. Examination of the left-middle field reveals wheezing. Examination of the left-lower field reveals decreased breath sounds. No decreased breath sounds, wheezing, rhonchi or rales.   Abdominal:      General: Bowel sounds are normal.      Palpations: Abdomen is soft.   Musculoskeletal:         General: Normal range of motion.      Cervical back: Normal range of motion and neck supple.      Right lower leg: Edema present.      Left lower leg: Edema present.   Skin:     General: Skin is warm and dry.      Capillary Refill: Capillary refill takes less than 2 seconds.   Neurological:      General: No focal deficit present.      Mental Status: He is alert and oriented to person, place, and time.   Psychiatric:         Mood and Affect: Mood normal.         Behavior: Behavior normal.         Procedures           ED Course  ED Course as of 08/26/24 1820   Mon Aug 26, 2024   1510 ECG 12 Lead Dyspnea  Atrial fibrillation, rate 102, QTc 510, no acute ST or T wave changes [CW]   1759 ECG 12 Lead Dyspnea [CW]      ED Course User Index  [CW] Min Cuevas, DO                                             Medical Decision Making  --On arrival, patient hemodynamically stable, 96% on  room air  --Labs notable for lactic acid of 4.5, sodium of 125  --COVID-positive on respiratory PCR  --CT angio chest noted 2 cavitary masses in the left lung, bibasilar effusions, left greater than right, enlarged mediastinal lymph node and low-attenuation focus in the left lobe of the liver possibly metastatic disease  --IV Decadron, Paxlovid, Rocephin/Doxy  --[facility] Jones    Problems Addressed:  COVID-19: complicated acute illness or injury  Lactic acidosis: complicated acute illness or injury  Lung mass: complicated acute illness or injury  Pleural effusion, left: complicated acute illness or injury  Pneumonia of left lower lobe due to infectious organism: complicated acute illness or injury    Amount and/or Complexity of Data Reviewed  Labs: ordered.  Radiology: ordered.  ECG/medicine tests: ordered. Decision-making details documented in ED Course.    Risk  Prescription drug management.        Final diagnoses:   COVID-19   Pneumonia of left lower lobe due to infectious organism   Pleural effusion, left   Lactic acidosis   Lung mass       ED Disposition  ED Disposition       ED Disposition   Transfer to Another Facility     Condition   --    Comment   --               No follow-up provider specified.       Medication List      No changes were made to your prescriptions during this visit.            Min Cuevas DO  08/26/24 3316

## 2024-08-27 NOTE — ED NOTES
[employer] will call back with weather check and when they hear back from [employer] [address] in [place].
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
## 2024-08-29 LAB
GAMMA INTERFERON BACKGROUND BLD IA-ACNC: 0.03 IU/ML
M TB IFN-G BLD-IMP: NEGATIVE
M TB IFN-G CD4+ BCKGRND COR BLD-ACNC: 0.03 IU/ML
M TB IFN-G CD4+CD8+ BCKGRND COR BLD-ACNC: 0.04 IU/ML
MITOGEN IGNF BCKGRD COR BLD-ACNC: 0.78 IU/ML
QUANTIFERON INCUBATION: NORMAL
SERVICE CMNT-IMP: NORMAL

## 2024-08-31 LAB
A FLAVUS AB SER QL ID: NEGATIVE
A FUMIGATUS AB SER QL ID: NEGATIVE
A NIGER AB SER QL ID: NEGATIVE
BACTERIA SPEC AEROBE CULT: NORMAL
BACTERIA SPEC AEROBE CULT: NORMAL

## 2024-10-20 ENCOUNTER — APPOINTMENT (OUTPATIENT)
Dept: GENERAL RADIOLOGY | Facility: HOSPITAL | Age: 84
DRG: 178 | End: 2024-10-20
Payer: OTHER GOVERNMENT

## 2024-10-20 ENCOUNTER — APPOINTMENT (OUTPATIENT)
Dept: CT IMAGING | Facility: HOSPITAL | Age: 84
DRG: 178 | End: 2024-10-20
Payer: OTHER GOVERNMENT

## 2024-10-20 ENCOUNTER — HOSPITAL ENCOUNTER (INPATIENT)
Facility: HOSPITAL | Age: 84
LOS: 7 days | Discharge: ANOTHER HOSPITAL | DRG: 178 | End: 2024-10-27
Attending: STUDENT IN AN ORGANIZED HEALTH CARE EDUCATION/TRAINING PROGRAM | Admitting: INTERNAL MEDICINE
Payer: OTHER GOVERNMENT

## 2024-10-20 DIAGNOSIS — J18.9 PNEUMONIA OF LEFT LUNG DUE TO INFECTIOUS ORGANISM, UNSPECIFIED PART OF LUNG: Primary | ICD-10-CM

## 2024-10-20 PROBLEM — C44.91 BASAL CELL CARCINOMA: Status: ACTIVE | Noted: 2024-09-04

## 2024-10-20 PROBLEM — R91.8 LUNG MASS: Status: ACTIVE | Noted: 2024-10-20

## 2024-10-20 PROBLEM — R26.2 INABILITY TO WALK: Status: ACTIVE | Noted: 2024-10-20

## 2024-10-20 PROBLEM — Z86.39 PERSONAL HISTORY OF OTHER ENDOCRINE, NUTRITIONAL AND METABOLIC DISEASE: Status: ACTIVE | Noted: 2024-10-17

## 2024-10-20 PROBLEM — H35.60 RETINAL HEMORRHAGE: Status: ACTIVE | Noted: 2024-10-17

## 2024-10-20 PROBLEM — I25.2 OLD MYOCARDIAL INFARCTION: Status: ACTIVE | Noted: 2024-10-17

## 2024-10-20 PROBLEM — C34.92 SQUAMOUS CELL LUNG CANCER, LEFT: Chronic | Status: ACTIVE | Noted: 2024-10-07

## 2024-10-20 PROBLEM — H31.8 IDIOPATHIC POLYPOIDAL CHOROIDAL VASCULOPATHY: Status: ACTIVE | Noted: 2021-06-28

## 2024-10-20 PROBLEM — H35.30 MACULAR DEGENERATION: Status: ACTIVE | Noted: 2024-10-17

## 2024-10-20 PROBLEM — E11.319 DIABETIC RETINOPATHY: Status: ACTIVE | Noted: 2021-06-28

## 2024-10-20 PROBLEM — C34.92 NSCLC OF LEFT LUNG: Status: ACTIVE | Noted: 2023-05-24

## 2024-10-20 LAB
A-A DO2: 34.1 MMHG (ref 0–300)
ALBUMIN SERPL-MCNC: 3.1 G/DL (ref 3.5–5.2)
ALBUMIN/GLOB SERPL: 0.7 G/DL
ALP SERPL-CCNC: 93 U/L (ref 39–117)
ALT SERPL W P-5'-P-CCNC: 28 U/L (ref 1–41)
ANION GAP SERPL CALCULATED.3IONS-SCNC: 10.5 MMOL/L (ref 5–15)
APTT PPP: 53.4 SECONDS (ref 26.5–34.5)
ARTERIAL PATENCY WRIST A: POSITIVE
AST SERPL-CCNC: 24 U/L (ref 1–40)
ATMOSPHERIC PRESS: 738 MMHG
BASE EXCESS BLDA CALC-SCNC: 0.9 MMOL/L (ref 0–2)
BASOPHILS # BLD AUTO: 0.02 10*3/MM3 (ref 0–0.2)
BASOPHILS NFR BLD AUTO: 0.1 % (ref 0–1.5)
BDY SITE: ABNORMAL
BILIRUB SERPL-MCNC: 0.3 MG/DL (ref 0–1.2)
BUN SERPL-MCNC: 16 MG/DL (ref 8–23)
BUN/CREAT SERPL: 17.2 (ref 7–25)
CALCIUM SPEC-SCNC: 10.3 MG/DL (ref 8.6–10.5)
CHLORIDE SERPL-SCNC: 88 MMOL/L (ref 98–107)
CO2 BLDA-SCNC: 26.4 MMOL/L (ref 22–33)
CO2 SERPL-SCNC: 25.5 MMOL/L (ref 22–29)
COHGB MFR BLD: 1.6 % (ref 0–5)
CREAT SERPL-MCNC: 0.93 MG/DL (ref 0.76–1.27)
CRP SERPL-MCNC: 12.93 MG/DL (ref 0–0.5)
D-LACTATE SERPL-SCNC: 2 MMOL/L (ref 0.5–2)
DEPRECATED RDW RBC AUTO: 58.2 FL (ref 37–54)
EGFRCR SERPLBLD CKD-EPI 2021: 81.5 ML/MIN/1.73
EOSINOPHIL # BLD AUTO: 0.02 10*3/MM3 (ref 0–0.4)
EOSINOPHIL NFR BLD AUTO: 0.1 % (ref 0.3–6.2)
ERYTHROCYTE [DISTWIDTH] IN BLOOD BY AUTOMATED COUNT: 18.1 % (ref 12.3–15.4)
FLUAV RNA RESP QL NAA+PROBE: NOT DETECTED
FLUBV RNA RESP QL NAA+PROBE: NOT DETECTED
GEN 5 2HR TROPONIN T REFLEX: 29 NG/L
GLOBULIN UR ELPH-MCNC: 4.5 GM/DL
GLUCOSE BLDC GLUCOMTR-MCNC: 154 MG/DL (ref 70–130)
GLUCOSE BLDC GLUCOMTR-MCNC: 195 MG/DL (ref 70–130)
GLUCOSE BLDC GLUCOMTR-MCNC: 58 MG/DL (ref 70–130)
GLUCOSE SERPL-MCNC: 163 MG/DL (ref 65–99)
HCO3 BLDA-SCNC: 25.2 MMOL/L (ref 20–26)
HCT VFR BLD AUTO: 29.9 % (ref 37.5–51)
HCT VFR BLD CALC: 28.2 % (ref 38–51)
HGB BLD-MCNC: 9 G/DL (ref 13–17.7)
HGB BLDA-MCNC: 9.2 G/DL (ref 14–18)
HOLD SPECIMEN: NORMAL
HOLD SPECIMEN: NORMAL
IMM GRANULOCYTES # BLD AUTO: 0.14 10*3/MM3 (ref 0–0.05)
IMM GRANULOCYTES NFR BLD AUTO: 1 % (ref 0–0.5)
INHALED O2 CONCENTRATION: 21 %
INR PPP: 1.71 (ref 0.9–1.1)
LYMPHOCYTES # BLD AUTO: 0.43 10*3/MM3 (ref 0.7–3.1)
LYMPHOCYTES NFR BLD AUTO: 3 % (ref 19.6–45.3)
Lab: ABNORMAL
MCH RBC QN AUTO: 26.2 PG (ref 26.6–33)
MCHC RBC AUTO-ENTMCNC: 30.1 G/DL (ref 31.5–35.7)
MCV RBC AUTO: 86.9 FL (ref 79–97)
METHGB BLD QL: 0.2 % (ref 0–3)
MODALITY: ABNORMAL
MONOCYTES # BLD AUTO: 0.93 10*3/MM3 (ref 0.1–0.9)
MONOCYTES NFR BLD AUTO: 6.6 % (ref 5–12)
NEUTROPHILS NFR BLD AUTO: 12.65 10*3/MM3 (ref 1.7–7)
NEUTROPHILS NFR BLD AUTO: 89.2 % (ref 42.7–76)
NRBC BLD AUTO-RTO: 0 /100 WBC (ref 0–0.2)
NT-PROBNP SERPL-MCNC: 5383 PG/ML (ref 0–1800)
OXYHGB MFR BLDV: 92.1 % (ref 94–99)
PCO2 BLDA: 38 MM HG (ref 35–45)
PCO2 TEMP ADJ BLD: ABNORMAL MM[HG]
PH BLDA: 7.43 PH UNITS (ref 7.35–7.45)
PH, TEMP CORRECTED: ABNORMAL
PLATELET # BLD AUTO: 433 10*3/MM3 (ref 140–450)
PMV BLD AUTO: 8.1 FL (ref 6–12)
PO2 BLDA: 68 MM HG (ref 83–108)
PO2 TEMP ADJ BLD: ABNORMAL MM[HG]
POTASSIUM SERPL-SCNC: 4.6 MMOL/L (ref 3.5–5.2)
PROCALCITONIN SERPL-MCNC: 0.08 NG/ML (ref 0–0.25)
PROT SERPL-MCNC: 7.6 G/DL (ref 6–8.5)
PROTHROMBIN TIME: 20.2 SECONDS (ref 12.1–14.7)
QT INTERVAL: 402 MS
QTC INTERVAL: 494 MS
RBC # BLD AUTO: 3.44 10*6/MM3 (ref 4.14–5.8)
RSV RNA RESP QL NAA+PROBE: NOT DETECTED
SAO2 % BLDCOA: 93.8 % (ref 94–99)
SARS-COV-2 RNA RESP QL NAA+PROBE: NOT DETECTED
SODIUM SERPL-SCNC: 124 MMOL/L (ref 136–145)
TROPONIN T DELTA: 2 NG/L
TROPONIN T SERPL HS-MCNC: 27 NG/L
VENTILATOR MODE: ABNORMAL
WBC NRBC COR # BLD AUTO: 14.19 10*3/MM3 (ref 3.4–10.8)
WHOLE BLOOD HOLD COAG: NORMAL
WHOLE BLOOD HOLD SPECIMEN: NORMAL

## 2024-10-20 PROCEDURE — G0378 HOSPITAL OBSERVATION PER HR: HCPCS

## 2024-10-20 PROCEDURE — 82805 BLOOD GASES W/O2 SATURATION: CPT

## 2024-10-20 PROCEDURE — 93005 ELECTROCARDIOGRAM TRACING: CPT | Performed by: NURSE PRACTITIONER

## 2024-10-20 PROCEDURE — 83050 HGB METHEMOGLOBIN QUAN: CPT

## 2024-10-20 PROCEDURE — 83605 ASSAY OF LACTIC ACID: CPT | Performed by: NURSE PRACTITIONER

## 2024-10-20 PROCEDURE — 99223 1ST HOSP IP/OBS HIGH 75: CPT

## 2024-10-20 PROCEDURE — 83880 ASSAY OF NATRIURETIC PEPTIDE: CPT | Performed by: NURSE PRACTITIONER

## 2024-10-20 PROCEDURE — 71045 X-RAY EXAM CHEST 1 VIEW: CPT

## 2024-10-20 PROCEDURE — 74176 CT ABD & PELVIS W/O CONTRAST: CPT | Performed by: RADIOLOGY

## 2024-10-20 PROCEDURE — 36600 WITHDRAWAL OF ARTERIAL BLOOD: CPT

## 2024-10-20 PROCEDURE — 82948 REAGENT STRIP/BLOOD GLUCOSE: CPT

## 2024-10-20 PROCEDURE — 86140 C-REACTIVE PROTEIN: CPT | Performed by: NURSE PRACTITIONER

## 2024-10-20 PROCEDURE — 71250 CT THORAX DX C-: CPT | Performed by: RADIOLOGY

## 2024-10-20 PROCEDURE — 84484 ASSAY OF TROPONIN QUANT: CPT | Performed by: NURSE PRACTITIONER

## 2024-10-20 PROCEDURE — 36415 COLL VENOUS BLD VENIPUNCTURE: CPT

## 2024-10-20 PROCEDURE — 25010000002 CEFTRIAXONE PER 250 MG: Performed by: NURSE PRACTITIONER

## 2024-10-20 PROCEDURE — 71045 X-RAY EXAM CHEST 1 VIEW: CPT | Performed by: RADIOLOGY

## 2024-10-20 PROCEDURE — 84145 PROCALCITONIN (PCT): CPT | Performed by: NURSE PRACTITIONER

## 2024-10-20 PROCEDURE — 93010 ELECTROCARDIOGRAM REPORT: CPT | Performed by: SPECIALIST

## 2024-10-20 PROCEDURE — 87040 BLOOD CULTURE FOR BACTERIA: CPT | Performed by: NURSE PRACTITIONER

## 2024-10-20 PROCEDURE — 87637 SARSCOV2&INF A&B&RSV AMP PRB: CPT | Performed by: NURSE PRACTITIONER

## 2024-10-20 PROCEDURE — 74176 CT ABD & PELVIS W/O CONTRAST: CPT

## 2024-10-20 PROCEDURE — 85610 PROTHROMBIN TIME: CPT | Performed by: NURSE PRACTITIONER

## 2024-10-20 PROCEDURE — 82375 ASSAY CARBOXYHB QUANT: CPT

## 2024-10-20 PROCEDURE — 85730 THROMBOPLASTIN TIME PARTIAL: CPT | Performed by: NURSE PRACTITIONER

## 2024-10-20 PROCEDURE — 99285 EMERGENCY DEPT VISIT HI MDM: CPT

## 2024-10-20 PROCEDURE — 85025 COMPLETE CBC W/AUTO DIFF WBC: CPT | Performed by: NURSE PRACTITIONER

## 2024-10-20 PROCEDURE — 80053 COMPREHEN METABOLIC PANEL: CPT | Performed by: NURSE PRACTITIONER

## 2024-10-20 PROCEDURE — 71250 CT THORAX DX C-: CPT

## 2024-10-20 PROCEDURE — 63710000001 INSULIN LISPRO (HUMAN) PER 5 UNITS

## 2024-10-20 RX ORDER — FUROSEMIDE 40 MG/1
40 TABLET ORAL DAILY
Status: CANCELLED | OUTPATIENT
Start: 2024-10-21

## 2024-10-20 RX ORDER — FERROUS GLUCONATE 324(38)MG
324 TABLET ORAL
COMMUNITY

## 2024-10-20 RX ORDER — DEXTROSE MONOHYDRATE 25 G/50ML
25 INJECTION, SOLUTION INTRAVENOUS ONCE
Status: COMPLETED | OUTPATIENT
Start: 2024-10-20 | End: 2024-10-20

## 2024-10-20 RX ORDER — POLYETHYLENE GLYCOL 3350 17 G/17G
17 POWDER, FOR SOLUTION ORAL DAILY PRN
Status: DISCONTINUED | OUTPATIENT
Start: 2024-10-20 | End: 2024-10-27 | Stop reason: HOSPADM

## 2024-10-20 RX ORDER — SILDENAFIL 100 MG/1
100 TABLET, FILM COATED ORAL DAILY PRN
COMMUNITY
End: 2024-10-27 | Stop reason: HOSPADM

## 2024-10-20 RX ORDER — LISINOPRIL 2.5 MG/1
5 TABLET ORAL DAILY
Status: CANCELLED | OUTPATIENT
Start: 2024-10-21

## 2024-10-20 RX ORDER — FOLIC ACID 1 MG/1
1 TABLET ORAL DAILY
COMMUNITY

## 2024-10-20 RX ORDER — GLIPIZIDE 10 MG/1
15 TABLET ORAL
COMMUNITY
End: 2024-10-27 | Stop reason: HOSPADM

## 2024-10-20 RX ORDER — INSULIN LISPRO 100 [IU]/ML
2-7 INJECTION, SOLUTION INTRAVENOUS; SUBCUTANEOUS
Status: DISCONTINUED | OUTPATIENT
Start: 2024-10-20 | End: 2024-10-27 | Stop reason: HOSPADM

## 2024-10-20 RX ORDER — LEVOFLOXACIN 500 MG/1
500 TABLET, FILM COATED ORAL DAILY
COMMUNITY
End: 2024-10-27 | Stop reason: HOSPADM

## 2024-10-20 RX ORDER — GLUCAGON 1 MG/ML
1 KIT INJECTION
Status: DISCONTINUED | OUTPATIENT
Start: 2024-10-20 | End: 2024-10-27 | Stop reason: HOSPADM

## 2024-10-20 RX ORDER — LEVOFLOXACIN 500 MG/1
500 TABLET, FILM COATED ORAL DAILY
Status: CANCELLED | OUTPATIENT
Start: 2024-10-21 | End: 2024-10-27

## 2024-10-20 RX ORDER — DEXTROSE MONOHYDRATE 25 G/50ML
25 INJECTION, SOLUTION INTRAVENOUS
Status: DISCONTINUED | OUTPATIENT
Start: 2024-10-20 | End: 2024-10-27 | Stop reason: HOSPADM

## 2024-10-20 RX ORDER — SENNOSIDES A AND B 8.6 MG/1
2 TABLET, FILM COATED ORAL DAILY PRN
Status: CANCELLED | OUTPATIENT
Start: 2024-10-20

## 2024-10-20 RX ORDER — POLYETHYLENE GLYCOL 3350 17 G/17G
17 POWDER, FOR SOLUTION ORAL DAILY PRN
COMMUNITY

## 2024-10-20 RX ORDER — SODIUM CHLORIDE 0.9 % (FLUSH) 0.9 %
10 SYRINGE (ML) INJECTION EVERY 12 HOURS SCHEDULED
Status: DISCONTINUED | OUTPATIENT
Start: 2024-10-20 | End: 2024-10-27 | Stop reason: HOSPADM

## 2024-10-20 RX ORDER — POLYVINYL ALCOHOL 14 MG/ML
1 SOLUTION/ DROPS OPHTHALMIC 3 TIMES DAILY PRN
COMMUNITY

## 2024-10-20 RX ORDER — DEXTROSE MONOHYDRATE 25 G/50ML
INJECTION, SOLUTION INTRAVENOUS
Status: COMPLETED
Start: 2024-10-20 | End: 2024-10-20

## 2024-10-20 RX ORDER — POLYETHYLENE GLYCOL 3350 17 G/17G
17 POWDER, FOR SOLUTION ORAL DAILY PRN
Status: CANCELLED | OUTPATIENT
Start: 2024-10-20

## 2024-10-20 RX ORDER — ATORVASTATIN CALCIUM 40 MG/1
20 TABLET, FILM COATED ORAL NIGHTLY
COMMUNITY

## 2024-10-20 RX ORDER — BISACODYL 10 MG
10 SUPPOSITORY, RECTAL RECTAL DAILY PRN
Status: DISCONTINUED | OUTPATIENT
Start: 2024-10-20 | End: 2024-10-27 | Stop reason: HOSPADM

## 2024-10-20 RX ORDER — FUROSEMIDE 40 MG/1
40 TABLET ORAL DAILY
COMMUNITY

## 2024-10-20 RX ORDER — METOPROLOL SUCCINATE 100 MG/1
50 TABLET, EXTENDED RELEASE ORAL DAILY
COMMUNITY

## 2024-10-20 RX ORDER — SODIUM CHLORIDE 0.9 % (FLUSH) 0.9 %
10 SYRINGE (ML) INJECTION AS NEEDED
Status: DISCONTINUED | OUTPATIENT
Start: 2024-10-20 | End: 2024-10-27 | Stop reason: HOSPADM

## 2024-10-20 RX ORDER — LISINOPRIL 10 MG/1
5 TABLET ORAL DAILY
COMMUNITY
End: 2024-10-27 | Stop reason: HOSPADM

## 2024-10-20 RX ORDER — BISACODYL 5 MG/1
5 TABLET, DELAYED RELEASE ORAL DAILY PRN
Status: DISCONTINUED | OUTPATIENT
Start: 2024-10-20 | End: 2024-10-27 | Stop reason: HOSPADM

## 2024-10-20 RX ORDER — SERTRALINE HYDROCHLORIDE 100 MG/1
100 TABLET, FILM COATED ORAL DAILY
COMMUNITY

## 2024-10-20 RX ORDER — AMOXICILLIN 250 MG
2 CAPSULE ORAL 2 TIMES DAILY PRN
Status: DISCONTINUED | OUTPATIENT
Start: 2024-10-20 | End: 2024-10-27 | Stop reason: HOSPADM

## 2024-10-20 RX ORDER — SENNOSIDES A AND B 8.6 MG/1
2 TABLET, FILM COATED ORAL DAILY PRN
COMMUNITY

## 2024-10-20 RX ORDER — METOPROLOL SUCCINATE 50 MG/1
50 TABLET, EXTENDED RELEASE ORAL DAILY
Status: CANCELLED | OUTPATIENT
Start: 2024-10-21

## 2024-10-20 RX ORDER — GLIPIZIDE 5 MG/1
15 TABLET ORAL
Status: CANCELLED | OUTPATIENT
Start: 2024-10-21

## 2024-10-20 RX ORDER — NICOTINE POLACRILEX 4 MG
15 LOZENGE BUCCAL
Status: DISCONTINUED | OUTPATIENT
Start: 2024-10-20 | End: 2024-10-27 | Stop reason: HOSPADM

## 2024-10-20 RX ADMIN — DEXTROSE MONOHYDRATE 25 G: 25 INJECTION, SOLUTION INTRAVENOUS at 20:11

## 2024-10-20 RX ADMIN — CEFTRIAXONE 1000 MG: 1 INJECTION, POWDER, FOR SOLUTION INTRAMUSCULAR; INTRAVENOUS at 14:45

## 2024-10-20 RX ADMIN — INSULIN LISPRO 2 UNITS: 100 INJECTION, SOLUTION INTRAVENOUS; SUBCUTANEOUS at 22:28

## 2024-10-20 RX ADMIN — Medication 10 ML: at 22:31

## 2024-10-20 NOTE — ED NOTES
Contacted [facility]. Took info and advised would call back after 2000 due to shift change. Provider aware

## 2024-10-20 NOTE — ED PROVIDER NOTES
Subjective   History of Present Illness  Patient is a 84-year-old male who presents to the emergency room today for shortness of breath, nausea, and abdominal pain.  Patient reports that he has been short of breath for several days and states he continues to get more unwell.  Patient also reports vomiting when he attempts to eat.  Patient reports that been going on for some time.  Patient reports that he lives alone and states [facility] be [facility] to get help with care.  Patient denies chest pain.  Patient denies fever.  Patient does report having hard time getting up.    Shortness of Breath      Review of Systems   HENT: Negative.     Eyes: Negative.    Respiratory:  Positive for shortness of breath.    Cardiovascular: Negative.    Gastrointestinal: Negative.    Endocrine: Negative.    Genitourinary: Negative.    Musculoskeletal: Negative.    Skin: Negative.    Allergic/Immunologic: Negative.    Neurological:  Positive for weakness.   Hematological: Negative.    Psychiatric/Behavioral: Negative.         History reviewed. No pertinent past medical history.    No Known Allergies    No past surgical history on file.    History reviewed. No pertinent family history.    Social History     Socioeconomic History    Marital status: Single           Objective   Physical Exam  Vitals and nursing note reviewed.   Constitutional:       Appearance: He is well-developed.   HENT:      Head: Normocephalic.      Right Ear: External ear normal.      Left Ear: External ear normal.   Eyes:      Conjunctiva/sclera: Conjunctivae normal.      Pupils: Pupils are equal, round, and reactive to light.   Cardiovascular:      Rate and Rhythm: Normal rate and regular rhythm.      Heart sounds: Normal heart sounds.   Pulmonary:      Effort: Pulmonary effort is normal.      Breath sounds: Wheezing present.   Abdominal:      General: Bowel sounds are normal.      Palpations: Abdomen is soft.   Musculoskeletal:          General: Normal range of motion.      Cervical back: Normal range of motion and neck supple.   Skin:     General: Skin is warm and dry.      Capillary Refill: Capillary refill takes less than 2 seconds.   Neurological:      Mental Status: He is alert and oriented to person, place, and time.   Psychiatric:         Behavior: Behavior normal.         Thought Content: Thought content normal.         Procedures           ED Course  ED Course as of 10/20/24 2022   Sun Oct 20, 2024   1232 EKG performed on 10/20/2024 at 1055 shows atrial fibrillation, rate 91, QRS 96.  QRS 96.  No STEMI.  Electronically signed by Alexis Chaudhry DO, 10/20/24, 12:32 PM EDT.   [NG]   1954 Patient continues to request to be admitted.  Explained patient that [facility] could possibly [facility] but patient now refuses to [facility] and request to stay here in the hospital. [ROSALVA]      ED Course User Index  [ROSALVA] Mckinley Knox, APRN  [NG] Alexis Chaudhry DO                                             Medical Decision Making  MDM:    Escalation of care including admission/observation considered    - Discussions of management with other providers:  None and Hospitalist    - Discussed/reviewed with Radiology regarding test interpretation    - Independent interpretation: None    - Additional patient history obtained from: None    - Review of external non-ED record (if available):  Prior Inpt record, Office record, Outpt record, Prior Outpt labs, PCP record, Outside ED record, Other    - Chronic conditions affecting care: See HPI and medical Hx.    - Social Determinants of health significantly affecting care:  None        Medical Decision Making Discussion:    Patient is a 84-year-old male who presents to the emergency room today for shortness of breath, nausea, and abdominal pain.  Patient reports that he has been short of breath for several days and states he continues to get more unwell.  Patient also reports vomiting when he attempts to eat.   Patient reports that been going on for some time.  Patient reports that he lives alone and states that [facility] or be admitted to get help with care.  Patient denies chest pain.  Patient denies fever.  Patient does report having hard time getting up.    Patient was admitted under care of hospitalist.       The patient has been given very strict return precautions to return to the emergency department should there be any acute change or worsening of their condition.  I have explained my findings and the patient has expressed understanding to me.  I explained that the work-up performed in the ED has been based on the specific complaint and concern, as the nature of emergency medicine is complaint driven and they understand that new symptoms may arise.  I have told them that, should there be any new symptoms, worsening or changing symptoms, a new work-up may be indicated that they are encouraged to return to the emergency department or promptly contact their primary care physician. We have employed a shared decision-making process as the discussion of their disposition.  The patient has been educated as to the nature of the visit, the tests and work-up performed and the findings from today's visit. At this time, there does not appear to be any acute emergent process that necessitates admission to the hospital, however, the patient understands that this can change unexpectedly. At this time, the patient is stable for discharge home and agrees to follow-up with her primary care physician in the next 24 to 48 hours or earlier should they be able to obtain an appointment.    The patient was counseled regarding diagnostic results and treatment plan and patient has indicated understanding of these instructions.     Problems Addressed:  Pneumonia of left lung due to infectious organism, unspecified part of lung: complicated acute illness or injury    Amount and/or Complexity of Data Reviewed  Labs:  ordered. Decision-making details documented in ED Course.  Radiology: ordered. Decision-making details documented in ED Course.  ECG/medicine tests: ordered.    Risk  Prescription drug management.  Decision regarding hospitalization.      Results for orders placed or performed during the hospital encounter of 10/20/24   ECG 12 Lead Dyspnea    Collection Time: 10/20/24 10:55 AM   Result Value Ref Range    QT Interval 402 ms    QTC Interval 494 ms   Comprehensive Metabolic Panel    Collection Time: 10/20/24 11:10 AM    Specimen: Arm, Right; Blood   Result Value Ref Range    Glucose 163 (H) 65 - 99 mg/dL    BUN 16 8 - 23 mg/dL    Creatinine 0.93 0.76 - 1.27 mg/dL    Sodium 124 (L) 136 - 145 mmol/L    Potassium 4.6 3.5 - 5.2 mmol/L    Chloride 88 (L) 98 - 107 mmol/L    CO2 25.5 22.0 - 29.0 mmol/L    Calcium 10.3 8.6 - 10.5 mg/dL    Total Protein 7.6 6.0 - 8.5 g/dL    Albumin 3.1 (L) 3.5 - 5.2 g/dL    ALT (SGPT) 28 1 - 41 U/L    AST (SGOT) 24 1 - 40 U/L    Alkaline Phosphatase 93 39 - 117 U/L    Total Bilirubin 0.3 0.0 - 1.2 mg/dL    Globulin 4.5 gm/dL    A/G Ratio 0.7 g/dL    BUN/Creatinine Ratio 17.2 7.0 - 25.0    Anion Gap 10.5 5.0 - 15.0 mmol/L    eGFR 81.5 >60.0 mL/min/1.73   Protime-INR    Collection Time: 10/20/24 11:10 AM    Specimen: Arm, Right; Blood   Result Value Ref Range    Protime 20.2 (H) 12.1 - 14.7 Seconds    INR 1.71 (H) 0.90 - 1.10   aPTT    Collection Time: 10/20/24 11:10 AM    Specimen: Arm, Right; Blood   Result Value Ref Range    PTT 53.4 (H) 26.5 - 34.5 seconds   Lactic Acid, Plasma    Collection Time: 10/20/24 11:10 AM    Specimen: Arm, Right; Blood   Result Value Ref Range    Lactate 2.0 0.5 - 2.0 mmol/L   C-reactive Protein    Collection Time: 10/20/24 11:10 AM    Specimen: Arm, Right; Blood   Result Value Ref Range    C-Reactive Protein 12.93 (H) 0.00 - 0.50 mg/dL   Procalcitonin    Collection Time: 10/20/24 11:10 AM    Specimen: Arm, Right; Blood   Result Value Ref Range    Procalcitonin  0.08 0.00 - 0.25 ng/mL   CBC Auto Differential    Collection Time: 10/20/24 11:10 AM    Specimen: Arm, Right; Blood   Result Value Ref Range    WBC 14.19 (H) 3.40 - 10.80 10*3/mm3    RBC 3.44 (L) 4.14 - 5.80 10*6/mm3    Hemoglobin 9.0 (L) 13.0 - 17.7 g/dL    Hematocrit 29.9 (L) 37.5 - 51.0 %    MCV 86.9 79.0 - 97.0 fL    MCH 26.2 (L) 26.6 - 33.0 pg    MCHC 30.1 (L) 31.5 - 35.7 g/dL    RDW 18.1 (H) 12.3 - 15.4 %    RDW-SD 58.2 (H) 37.0 - 54.0 fl    MPV 8.1 6.0 - 12.0 fL    Platelets 433 140 - 450 10*3/mm3    Neutrophil % 89.2 (H) 42.7 - 76.0 %    Lymphocyte % 3.0 (L) 19.6 - 45.3 %    Monocyte % 6.6 5.0 - 12.0 %    Eosinophil % 0.1 (L) 0.3 - 6.2 %    Basophil % 0.1 0.0 - 1.5 %    Immature Grans % 1.0 (H) 0.0 - 0.5 %    Neutrophils, Absolute 12.65 (H) 1.70 - 7.00 10*3/mm3    Lymphocytes, Absolute 0.43 (L) 0.70 - 3.10 10*3/mm3    Monocytes, Absolute 0.93 (H) 0.10 - 0.90 10*3/mm3    Eosinophils, Absolute 0.02 0.00 - 0.40 10*3/mm3    Basophils, Absolute 0.02 0.00 - 0.20 10*3/mm3    Immature Grans, Absolute 0.14 (H) 0.00 - 0.05 10*3/mm3    nRBC 0.0 0.0 - 0.2 /100 WBC   High Sensitivity Troponin T    Collection Time: 10/20/24 11:10 AM    Specimen: Arm, Right; Blood   Result Value Ref Range    HS Troponin T 27 (H) <22 ng/L   BNP    Collection Time: 10/20/24 11:10 AM    Specimen: Arm, Right; Blood   Result Value Ref Range    proBNP 5,383.0 (H) 0.0 - 1,800.0 pg/mL   Green Top (Gel)    Collection Time: 10/20/24 11:10 AM   Result Value Ref Range    Extra Tube Hold for add-ons.    Lavender Top    Collection Time: 10/20/24 11:10 AM   Result Value Ref Range    Extra Tube hold for add-on    Gold Top - SST    Collection Time: 10/20/24 11:10 AM   Result Value Ref Range    Extra Tube Hold for add-ons.    Light Blue Top    Collection Time: 10/20/24 11:10 AM   Result Value Ref Range    Extra Tube Hold for add-ons.    Blood Gas, Arterial With Co-Ox    Collection Time: 10/20/24 11:12 AM    Specimen: Arterial Blood   Result Value Ref Range     Site Left Radial     Sean's Test Positive     pH, Arterial 7.430 7.350 - 7.450 pH units    pCO2, Arterial 38.0 35.0 - 45.0 mm Hg    pO2, Arterial 68.0 (L) 83.0 - 108.0 mm Hg    HCO3, Arterial 25.2 20.0 - 26.0 mmol/L    Base Excess, Arterial 0.9 0.0 - 2.0 mmol/L    O2 Saturation, Arterial 93.8 (L) 94.0 - 99.0 %    Hemoglobin, Blood Gas 9.2 (L) 14 - 18 g/dL    Hematocrit, Blood Gas 28.2 (L) 38.0 - 51.0 %    Oxyhemoglobin 92.1 (L) 94 - 99 %    Methemoglobin 0.20 0.00 - 3.00 %    Carboxyhemoglobin 1.6 0 - 5 %    A-a DO2 34.1 0.0 - 300.0 mmHg    CO2 Content 26.4 22 - 33 mmol/L    Barometric Pressure for Blood Gas 738 mmHg    Modality Room Air     FIO2 21 %    Ventilator Mode NA     Collected by [id number]     pH, Temp Corrected      pCO2, Temperature Corrected      pO2, Temperature Corrected     COVID-19, FLU A/B, RSV PCR 1 HR TAT - Swab, Nasopharynx    Collection Time: 10/20/24 11:30 AM    Specimen: Nasopharynx; Swab   Result Value Ref Range    COVID19 Not Detected Not Detected - Ref. Range    Influenza A PCR Not Detected Not Detected    Influenza B PCR Not Detected Not Detected    RSV, PCR Not Detected Not Detected   High Sensitivity Troponin T 2Hr    Collection Time: 10/20/24  1:09 PM    Specimen: Arm, Left; Blood   Result Value Ref Range    HS Troponin T 29 (H) <22 ng/L    Troponin T Delta 2 >=-4 - <+4 ng/L   POC Glucose Once    Collection Time: 10/20/24  8:05 PM    Specimen: Blood   Result Value Ref Range    Glucose 58 (L) 70 - 130 mg/dL     CT Chest Without Contrast Diagnostic   Final Result       1.  Heterogeneous area of consolidation in the superior segment of the   left lower lobe with central cavitation consistent with pneumonia with   probable contribution from underlying mass lesion/neoplasm.   2.  Markedly enlarged mediastinal lymph nodes including subcarinal and   AP window lymph nodes with heterogeneous attenuation suggestive of   underlying metastatic disease and neoplastic involvement.   3.  Enlarged  heart size.   4.  Small right pleural effusion.   5.  Moderate size left pleural effusion.   6.  Focal area of consolidation in the anterior aspect of the left upper   lobe with spiculated margins suspicious for additional mass lesion.   7.  Emphysema.   8.  No pneumothorax.   9.  Small left hepatic cyst.   10.  Degenerative disc disease throughout the thoracic spine with   endplate and facet hypertrophic changes.           This report was finalized on 10/20/2024 3:33 PM by Chico Modi MD.          XR Chest AP   Final Result   Impression:       1.  Enlarged heart size.   2.  Airspace disease in the left lower lobe and lower lingula.   3.  Small left pleural effusion.   4.  Sternotomy.   5.  Coarsened bronchovascular pattern to the lungs   6.  No pneumothorax.           This report was finalized on 10/20/2024 12:24 PM by Chico Modi MD.          CT Abdomen Pelvis Without Contrast   Final Result       1.  Mild enlarged heart size.   2.  Bilateral pleural effusions, left greater than right with some mild   compressive atelectasis at the lung bases.   3.  Normal appendix is well seen.   4.  Slightly ectatic infrarenal abdominal aortic segment.   5.  Small left hepatic cyst.   6.  1 mm calcification in the lower pole of the left kidney.   7.  Degenerative disc disease throughout the lumbar spine with endplate   and facet hypertrophic changes.   8.  Fluid-filled distended bladder.   9.  Mild enlarged prostate gland.   10.  Mild colon and sigmoid colon diverticulosis.   11.  Mild constipation throughout the colon.   12.  No features of enterocolitis.   13.  No diverticulitis.   14.  No free fluid or free air.   15.  No abscess or hematoma.       This report was finalized on 10/20/2024 12:40 PM by Chico Modi MD.                Final diagnoses:   Pneumonia of left lung due to infectious organism, unspecified part of lung       ED Disposition  ED Disposition       ED Disposition   Decision to Admit    Condition   --     Comment   --               No follow-up provider specified.       Medication List      No changes were made to your prescriptions during this visit.            Mckinley Knox P, APRN  10/20/24 2022

## 2024-10-21 ENCOUNTER — APPOINTMENT (OUTPATIENT)
Dept: GENERAL RADIOLOGY | Facility: HOSPITAL | Age: 84
DRG: 178 | End: 2024-10-21
Payer: OTHER GOVERNMENT

## 2024-10-21 LAB
ALBUMIN SERPL-MCNC: 2.7 G/DL (ref 3.5–5.2)
ALBUMIN/GLOB SERPL: 0.7 G/DL
ALP SERPL-CCNC: 82 U/L (ref 39–117)
ALT SERPL W P-5'-P-CCNC: 24 U/L (ref 1–41)
ANION GAP SERPL CALCULATED.3IONS-SCNC: 8.4 MMOL/L (ref 5–15)
AST SERPL-CCNC: 22 U/L (ref 1–40)
BASOPHILS # BLD AUTO: 0.03 10*3/MM3 (ref 0–0.2)
BASOPHILS NFR BLD AUTO: 0.3 % (ref 0–1.5)
BILIRUB SERPL-MCNC: 0.2 MG/DL (ref 0–1.2)
BUN SERPL-MCNC: 18 MG/DL (ref 8–23)
BUN/CREAT SERPL: 16.8 (ref 7–25)
CALCIUM SPEC-SCNC: 10 MG/DL (ref 8.6–10.5)
CHLORIDE SERPL-SCNC: 93 MMOL/L (ref 98–107)
CO2 SERPL-SCNC: 26.6 MMOL/L (ref 22–29)
CREAT SERPL-MCNC: 1.07 MG/DL (ref 0.76–1.27)
DEPRECATED RDW RBC AUTO: 58.3 FL (ref 37–54)
EGFRCR SERPLBLD CKD-EPI 2021: 68.9 ML/MIN/1.73
EOSINOPHIL # BLD AUTO: 0.05 10*3/MM3 (ref 0–0.4)
EOSINOPHIL NFR BLD AUTO: 0.4 % (ref 0.3–6.2)
ERYTHROCYTE [DISTWIDTH] IN BLOOD BY AUTOMATED COUNT: 18.3 % (ref 12.3–15.4)
GLOBULIN UR ELPH-MCNC: 4 GM/DL
GLUCOSE BLDC GLUCOMTR-MCNC: 127 MG/DL (ref 70–130)
GLUCOSE BLDC GLUCOMTR-MCNC: 146 MG/DL (ref 70–130)
GLUCOSE BLDC GLUCOMTR-MCNC: 162 MG/DL (ref 70–130)
GLUCOSE BLDC GLUCOMTR-MCNC: 186 MG/DL (ref 70–130)
GLUCOSE BLDC GLUCOMTR-MCNC: 197 MG/DL (ref 70–130)
GLUCOSE BLDC GLUCOMTR-MCNC: 220 MG/DL (ref 70–130)
GLUCOSE BLDC GLUCOMTR-MCNC: 239 MG/DL (ref 70–130)
GLUCOSE BLDC GLUCOMTR-MCNC: 76 MG/DL (ref 70–130)
GLUCOSE SERPL-MCNC: 68 MG/DL (ref 65–99)
HBA1C MFR BLD: 8.4 % (ref 4.8–5.6)
HCT VFR BLD AUTO: 26.9 % (ref 37.5–51)
HGB BLD-MCNC: 8 G/DL (ref 13–17.7)
IMM GRANULOCYTES # BLD AUTO: 0.08 10*3/MM3 (ref 0–0.05)
IMM GRANULOCYTES NFR BLD AUTO: 0.7 % (ref 0–0.5)
LYMPHOCYTES # BLD AUTO: 0.53 10*3/MM3 (ref 0.7–3.1)
LYMPHOCYTES NFR BLD AUTO: 4.5 % (ref 19.6–45.3)
MAGNESIUM SERPL-MCNC: 1.8 MG/DL (ref 1.6–2.4)
MCH RBC QN AUTO: 25.9 PG (ref 26.6–33)
MCHC RBC AUTO-ENTMCNC: 29.7 G/DL (ref 31.5–35.7)
MCV RBC AUTO: 87.1 FL (ref 79–97)
MONOCYTES # BLD AUTO: 1.05 10*3/MM3 (ref 0.1–0.9)
MONOCYTES NFR BLD AUTO: 8.9 % (ref 5–12)
NEUTROPHILS NFR BLD AUTO: 10.07 10*3/MM3 (ref 1.7–7)
NEUTROPHILS NFR BLD AUTO: 85.2 % (ref 42.7–76)
NRBC BLD AUTO-RTO: 0 /100 WBC (ref 0–0.2)
PLATELET # BLD AUTO: 386 10*3/MM3 (ref 140–450)
PMV BLD AUTO: 8.3 FL (ref 6–12)
POTASSIUM SERPL-SCNC: 4.6 MMOL/L (ref 3.5–5.2)
PROT SERPL-MCNC: 6.7 G/DL (ref 6–8.5)
RBC # BLD AUTO: 3.09 10*6/MM3 (ref 4.14–5.8)
SODIUM SERPL-SCNC: 128 MMOL/L (ref 136–145)
TSH SERPL DL<=0.05 MIU/L-ACNC: 9.22 UIU/ML (ref 0.27–4.2)
WBC NRBC COR # BLD AUTO: 11.81 10*3/MM3 (ref 3.4–10.8)

## 2024-10-21 PROCEDURE — 83735 ASSAY OF MAGNESIUM: CPT | Performed by: INTERNAL MEDICINE

## 2024-10-21 PROCEDURE — 99253 IP/OBS CNSLTJ NEW/EST LOW 45: CPT | Performed by: INTERNAL MEDICINE

## 2024-10-21 PROCEDURE — G0378 HOSPITAL OBSERVATION PER HR: HCPCS

## 2024-10-21 PROCEDURE — 85025 COMPLETE CBC W/AUTO DIFF WBC: CPT

## 2024-10-21 PROCEDURE — 99232 SBSQ HOSP IP/OBS MODERATE 35: CPT | Performed by: HOSPITALIST

## 2024-10-21 PROCEDURE — 82948 REAGENT STRIP/BLOOD GLUCOSE: CPT

## 2024-10-21 PROCEDURE — 87070 CULTURE OTHR SPECIMN AEROBIC: CPT | Performed by: INTERNAL MEDICINE

## 2024-10-21 PROCEDURE — 92610 EVALUATE SWALLOWING FUNCTION: CPT | Performed by: SPEECH-LANGUAGE PATHOLOGIST

## 2024-10-21 PROCEDURE — 25010000002 KETOROLAC TROMETHAMINE PER 15 MG

## 2024-10-21 PROCEDURE — 92611 MOTION FLUOROSCOPY/SWALLOW: CPT | Performed by: SPEECH-LANGUAGE PATHOLOGIST

## 2024-10-21 PROCEDURE — 74230 X-RAY XM SWLNG FUNCJ C+: CPT

## 2024-10-21 PROCEDURE — 63710000001 INSULIN LISPRO (HUMAN) PER 5 UNITS

## 2024-10-21 PROCEDURE — 25010000002 CEFTRIAXONE PER 250 MG: Performed by: INTERNAL MEDICINE

## 2024-10-21 PROCEDURE — 80053 COMPREHEN METABOLIC PANEL: CPT

## 2024-10-21 PROCEDURE — 83036 HEMOGLOBIN GLYCOSYLATED A1C: CPT | Performed by: INTERNAL MEDICINE

## 2024-10-21 PROCEDURE — 97162 PT EVAL MOD COMPLEX 30 MIN: CPT

## 2024-10-21 PROCEDURE — 87205 SMEAR GRAM STAIN: CPT | Performed by: INTERNAL MEDICINE

## 2024-10-21 PROCEDURE — 97166 OT EVAL MOD COMPLEX 45 MIN: CPT

## 2024-10-21 PROCEDURE — 74230 X-RAY XM SWLNG FUNCJ C+: CPT | Performed by: RADIOLOGY

## 2024-10-21 PROCEDURE — 84443 ASSAY THYROID STIM HORMONE: CPT | Performed by: INTERNAL MEDICINE

## 2024-10-21 RX ORDER — ASCORBIC ACID 500 MG
250 TABLET ORAL DAILY
Status: DISCONTINUED | OUTPATIENT
Start: 2024-10-21 | End: 2024-10-27 | Stop reason: HOSPADM

## 2024-10-21 RX ORDER — KETOROLAC TROMETHAMINE 30 MG/ML
30 INJECTION, SOLUTION INTRAMUSCULAR; INTRAVENOUS ONCE
Status: COMPLETED | OUTPATIENT
Start: 2024-10-21 | End: 2024-10-21

## 2024-10-21 RX ORDER — ATORVASTATIN CALCIUM 20 MG/1
20 TABLET, FILM COATED ORAL NIGHTLY
Status: DISCONTINUED | OUTPATIENT
Start: 2024-10-21 | End: 2024-10-27 | Stop reason: HOSPADM

## 2024-10-21 RX ORDER — PANTOPRAZOLE SODIUM 40 MG/1
40 TABLET, DELAYED RELEASE ORAL
Status: DISCONTINUED | OUTPATIENT
Start: 2024-10-21 | End: 2024-10-27 | Stop reason: HOSPADM

## 2024-10-21 RX ORDER — POLYVINYL ALCOHOL 14 MG/ML
1 SOLUTION/ DROPS OPHTHALMIC 3 TIMES DAILY PRN
Status: DISCONTINUED | OUTPATIENT
Start: 2024-10-21 | End: 2024-10-27 | Stop reason: HOSPADM

## 2024-10-21 RX ORDER — FOLIC ACID 1 MG/1
1 TABLET ORAL DAILY
Status: DISCONTINUED | OUTPATIENT
Start: 2024-10-21 | End: 2024-10-27 | Stop reason: HOSPADM

## 2024-10-21 RX ORDER — FERROUS SULFATE 325(65) MG
325 TABLET ORAL
Status: DISCONTINUED | OUTPATIENT
Start: 2024-10-21 | End: 2024-10-27 | Stop reason: HOSPADM

## 2024-10-21 RX ADMIN — Medication 10 ML: at 20:24

## 2024-10-21 RX ADMIN — DOXYCYCLINE 100 MG: 100 INJECTION, POWDER, LYOPHILIZED, FOR SOLUTION INTRAVENOUS at 07:17

## 2024-10-21 RX ADMIN — Medication 200 MG: at 06:02

## 2024-10-21 RX ADMIN — INSULIN LISPRO 2 UNITS: 100 INJECTION, SOLUTION INTRAVENOUS; SUBCUTANEOUS at 17:57

## 2024-10-21 RX ADMIN — Medication 200 MG: at 15:52

## 2024-10-21 RX ADMIN — PANTOPRAZOLE SODIUM 40 MG: 40 TABLET, DELAYED RELEASE ORAL at 06:02

## 2024-10-21 RX ADMIN — DOXYCYCLINE 100 MG: 100 INJECTION, POWDER, LYOPHILIZED, FOR SOLUTION INTRAVENOUS at 17:57

## 2024-10-21 RX ADMIN — OXYCODONE HYDROCHLORIDE AND ACETAMINOPHEN 250 MG: 500 TABLET ORAL at 09:10

## 2024-10-21 RX ADMIN — INSULIN LISPRO 3 UNITS: 100 INJECTION, SOLUTION INTRAVENOUS; SUBCUTANEOUS at 20:25

## 2024-10-21 RX ADMIN — KETOROLAC TROMETHAMINE 30 MG: 30 INJECTION, SOLUTION INTRAMUSCULAR; INTRAVENOUS at 21:11

## 2024-10-21 RX ADMIN — SERTRALINE 100 MG: 50 TABLET, FILM COATED ORAL at 09:10

## 2024-10-21 RX ADMIN — Medication 200 MG: at 21:51

## 2024-10-21 RX ADMIN — INSULIN LISPRO 2 UNITS: 100 INJECTION, SOLUTION INTRAVENOUS; SUBCUTANEOUS at 12:01

## 2024-10-21 RX ADMIN — FOLIC ACID 1 MG: 1 TABLET ORAL at 09:10

## 2024-10-21 RX ADMIN — ATORVASTATIN CALCIUM 20 MG: 20 TABLET, FILM COATED ORAL at 20:24

## 2024-10-21 RX ADMIN — APIXABAN 5 MG: 5 TABLET, FILM COATED ORAL at 20:24

## 2024-10-21 RX ADMIN — FERROUS SULFATE TAB 325 MG (65 MG ELEMENTAL FE) 325 MG: 325 (65 FE) TAB at 07:18

## 2024-10-21 RX ADMIN — APIXABAN 5 MG: 5 TABLET, FILM COATED ORAL at 09:10

## 2024-10-21 RX ADMIN — Medication 10 ML: at 09:10

## 2024-10-21 RX ADMIN — CEFTRIAXONE 1 G: 1 INJECTION, POWDER, FOR SOLUTION INTRAMUSCULAR; INTRAVENOUS at 06:04

## 2024-10-21 NOTE — CASE MANAGEMENT/SOCIAL WORK
Discharge Planning Assessment   Dennis     Patient Name: Tyler Andrade  MRN: [id number]  Today's Date: 10/21/2024    Admit Date: 10/20/2024     Discharge Plan       Row Name 10/21/24 1215       Plan    Plan SS received Provider consult for help with disposition, has nsclc. SS spoke with Pt at bedside on this date. Pt lives at [address] [place] [address]. Pt lives alone and plans to return home at discharge. Pt does not utilize home health or dme services. Pt's PCP is Dr[facility]rendar Faustin at [facility]. Pt does not have a POA or living will. Pt will need [employer] for transportation home. Pt would benefit from a straight cane or rolling walker at discharge. Pt does not have a preference of dme company. Dme will need to be pre-approved by [facility]. SS to follow.                    CYDNEY RobersonW

## 2024-10-21 NOTE — CASE MANAGEMENT/SOCIAL WORK
Discharge Planning Assessment   Dennis     Patient Name: Tyler Andrade  MRN: [id number]  Today's Date: 10/20/2024    Admit Date: 10/20/2024    Plan: Pt lives alone at [address] and plans to return home at discharge. Pt states he usually drives himself but came to ER by EMS today and will need help with transportation home. Pt uses [employer] mail order and has VA insurance and [employer] Medicare Replacement. Pt states he needs help getting home health, a cane and walker and is also needed more glucose test strips than he is being prescribed. He says he is having issues managing his blood sugar lately.   Discharge Needs Assessment       Row Name 10/20/24 2040       Living Environment    People in Home alone    Current Living Arrangements home    Potentially Unsafe Housing Conditions none    Primary Care Provided by self    Family Caregiver if Needed none    Quality of Family Relationships unable to assess    Able to Return to Prior Arrangements yes       Resource/Environmental Concerns    Resource/Environmental Concerns none       Transition Planning    Patient/Family Anticipates Transition to home    Patient/Family Anticipated Services at Transition home health care;durable medical equipment;other (see comments)    Transportation Anticipated car, drives self;other (see comments)       Discharge Needs Assessment    Readmission Within the Last 30 Days no previous admission in last 30 days    Equipment Currently Used at Home none    Concerns to be Addressed discharge planning    Anticipated Changes Related to Illness none    Equipment Needed After Discharge cane, straight;walker, standard;other (see comments)                   Discharge Plan       Row Name 10/20/24 2041       Plan    Plan Pt lives alone at [address] and plans to return home at discharge. Pt states he usually drives himself but came to ER by EMS today and will need help with transportation home. Pt uses [employer] mail order and has [facility] insurance and [employer]  Medicare Replacement. Pt states he needs help getting home health, a cane and walker and is also needed more glucose test strips than he is being prescribed. He says he is having issues managing his blood sugar lately.                  Continued Care and Services - Admitted Since 10/20/2024    No active coordination exists for this encounter.       Expected Discharge Date and Time       Expected Discharge Date Expected Discharge Time    Oct 22, 2024            Demographic Summary       Row Name 10/20/24 2040       General Information    Admission Type observation    Arrived From home    Referral Source emergency department    Reason for Consult discharge planning                  aJnneth Langford RN

## 2024-10-21 NOTE — PLAN OF CARE
Goal Outcome Evaluation:           Progress: improving     Pt resting in bed, VSS on room air. Pts diet changed to nectar thick. Pt ambulated to wheel chair this shift. Pt voices no concerns or complaints at this time, will continue with POC.

## 2024-10-21 NOTE — PLAN OF CARE
DYSPHAGIA THERAPY PLAN OF CARE:    Tyler Andrade will benefit from formal dysphagia therapy  3-5 days per week at 15-60 minute sessions, as functionally tolerated, for 3-10 days, to address:    Long Term Goal:  Patient will accept least restrictive diet tolerance w/o overt s/s aspiration.     Short Term Goals:  1. Patient will perform OROM/KELLY exercises x3 sets x10 reps w/ min cues.  2. Patient will perform resistive breathing exercises x7sets x7 reps w/resistance of 1-5 to improve respiratory support and control.   3. Patient will perform CTAR exercises sustained x3 sets x5 reps for 30+ seconds over 3 consecutive sessions.   4. Patient will perform CTAR exercises repetitive x3 sets x12 reps w/ mod cues.   5. Patient will perform compensatory techniques during meals w/ min cues.  6. Patient will accept ice/chip water per protocol between meals and medications for oral hydration/comfort with assistance.   7. Patient will participate in instrumental re-evaluation of swallowing fnx in 3-5 days, pending progress towards this poc.    Thank you for allowing me to participate in the care of your patient-  Celestina Gallardo M.A., CCC-SLP

## 2024-10-21 NOTE — CONSULTS
Pulmonary and critical care consultation note  Referring Provider: dr Campos  Reason for Consultation: Abnormal CT chest      Chief complaint -shortness of breath      History of present illness: 84-year-old male with past medical history significant for lung cancer, MI and diabetes mellitus presented to ER for evaluation of shortness of breath.  Shortness of breath has been present since last 1 month and has been progressively getting worse.  HPI from the time of admission reviewed.  On my assessment patient was on room air was going to go for a speech and swallow evaluation.    All the labs medications ins and outs and vital signs at time of admission reviewed.      Review of Systems  History obtained from chart review and the patient  General ROS: negative for - chills, fatigue or fever  Psychological ROS: negative for - anxiety or depression  ENT ROS: negative for - headaches, visual changes or vocal changes  Respiratory ROS: positive for - cough and shortness of breath  Cardiovascular ROS: no chest pain or dyspnea on exertion  Gastrointestinal ROS: no abdominal pain, change in bowel habits, or black or bloody stools  Musculoskeletal ROS: negative for - joint pain, joint stiffness or joint swelling  Neurological ROS: no TIA or stroke symptoms  Hematological: no bleeding  Skin: no bruises, no rash        History  History reviewed. No pertinent past medical history., History reviewed. No pertinent surgical history., History reviewed. No pertinent family history.,   Social History     Tobacco Use    Smoking status: Former     Current packs/day: 0.00     Types: Cigarettes     Quit date:      Years since quittin.8     Passive exposure: Past    Smokeless tobacco: Never   Vaping Use    Vaping status: Never Used   Substance Use Topics    Alcohol use: Never    Drug use: Never   ,   Medications Prior to Admission   Medication Sig Dispense Refill Last Dose/Taking    apixaban (ELIQUIS) 5 MG tablet tablet Take 1  tablet by mouth 2 (Two) Times a Day.   10/20/2024 Morning    ascorbic acid (VITAMIN C) 250 MG tablet Take 1 tablet by mouth Daily.   10/20/2024 Morning    atorvastatin (LIPITOR) 40 MG tablet Take 0.5 tablets by mouth Every Night.   10/19/2024 Bedtime    empagliflozin (JARDIANCE) 25 MG tablet tablet Take 0.5 tablets by mouth Daily.   10/20/2024 Morning    ferrous gluconate (FERGON) 324 MG tablet Take 1 tablet by mouth Daily With Breakfast.   10/20/2024 Morning    folic acid (FOLVITE) 1 MG tablet Take 1 tablet by mouth Daily.   10/20/2024 Morning    furosemide (LASIX) 40 MG tablet Take 1 tablet by mouth Daily.   10/20/2024    glipizide (GLUCOTROL) 10 MG tablet Take 1.5 tablets by mouth 2 (Two) Times a Day Before Meals.   10/20/2024 Morning    levoFLOXacin (LEVAQUIN) 500 MG tablet Take 1 tablet by mouth Daily.   10/20/2024    lisinopril (PRINIVIL,ZESTRIL) 10 MG tablet Take 0.5 tablets by mouth Daily.   10/20/2024 Morning    metFORMIN (GLUCOPHAGE) 1000 MG tablet Take 1 tablet by mouth 2 (Two) Times a Day With Meals.   10/20/2024 Morning    metoprolol succinate XL (TOPROL-XL) 100 MG 24 hr tablet Take 0.5 tablets by mouth Daily.   10/20/2024    omeprazole (priLOSEC) 20 MG capsule Take 1 capsule by mouth Daily.   10/20/2024    sertraline (ZOLOFT) 100 MG tablet Take 1 tablet by mouth Daily.   10/20/2024    Melatonin 3 MG capsule Take 2 capsules by mouth At Night As Needed (Sleep).   Unknown    polyethylene glycol (MiraLax Mix-In [place]) 17 g packet Take 17 g by mouth Daily As Needed (Constipation).   Unknown    polyvinyl alcohol (LIQUIFILM) 1.4 % ophthalmic solution Administer 1 drop to both eyes 3 (Three) Times a Day As Needed for Dry Eyes.   Unknown    psyllium (METAMUCIL) 58.6 % packet Take 1 packet by mouth Daily As Needed (Constipation).   Unknown    senna 8.6 MG tablet Take 2 tablets by mouth Daily As Needed for Constipation.   Unknown    sildenafil (VIAGRA) 100 MG tablet Take 1 tablet by mouth Daily As Needed for  Erectile Dysfunction.   Unknown   , Scheduled Meds:  apixaban, 5 mg, Oral, BID  ascorbic acid, 250 mg, Oral, Daily  atorvastatin, 20 mg, Oral, Nightly  cefTRIAXone, 1 g, Intravenous, Q24H  doxycycline, 100 mg, Intravenous, Q12H  ferrous sulfate, 325 mg, Oral, Daily With Breakfast  folic acid, 1 mg, Oral, Daily  insulin lispro, 2-7 Units, Subcutaneous, 4x Daily AC & at Bedtime  pantoprazole, 40 mg, Oral, Q AM  sertraline, 100 mg, Oral, Daily  sodium chloride, 10 mL, Intravenous, Q12H  vitamin B-1, 200 mg, Oral, Q8H    , Continuous Infusions:    and Allergies:  Patient has no known allergies.    Objective     Vital Signs   Temp:  [97.8 °F (36.6 °C)-98.7 °F (37.1 °C)] 98.6 °F (37 °C)  Heart Rate:  [77-95] 86  Resp:  [17-20] 20  BP: ()/(51-95) 119/66    Physical Exam:             General- normal in appearance, not in any acute distress    HEENT- pupils equally reactive to light, normal in size, no scleral icterus    Neck-supple    Respiratory-respirations normal-on auscultation no wheezing no crackles,     Cardiovascular-  Normal S1 and S2. No S3, S4 or murmurs. No JVD, no carotid bruit and no edema, pulses normal bilaterally     GI-nontender nondistended bowel sounds positive    CNS-nonfocal    Musculoskeletal -no edema  Extremities- no obvious deformity noticed     Psychiatric-mood good, good eye contact, alert awake oriented  Skin-some healing spots on the face.  As per the patient he had skin cancer.                                                                  Results Review:    LABS:    Lab Results   Component Value Date    GLUCOSE 68 10/21/2024    BUN 18 10/21/2024    CREATININE 1.07 10/21/2024    EGFRIFNONA 45 (L) 05/22/2021    BCR 16.8 10/21/2024    CO2 26.6 10/21/2024    CALCIUM 10.0 10/21/2024    ALBUMIN 2.7 (L) 10/21/2024    AST 22 10/21/2024    ALT 24 10/21/2024    WBC 11.81 (H) 10/21/2024    HGB 8.0 (L) 10/21/2024    HCT 26.9 (L) 10/21/2024    MCV 87.1 10/21/2024     10/21/2024      (L) 10/21/2024    K 4.6 10/21/2024    CL 93 (L) 10/21/2024    ANIONGAP 8.4 10/21/2024       Lab Results   Component Value Date    INR 1.71 (H) 10/20/2024    INR 1.16 (H) 08/26/2024    INR 1.20 (H) 08/31/2023    PROTIME 20.2 (H) 10/20/2024    PROTIME 14.9 (H) 08/26/2024    PROTIME 15.8 (H) 08/31/2023       Results from last 7 days   Lab Units 10/20/24  1110   INR  1.71*   APTT seconds 53.4*          I reviewed the patient's new clinical results.  I reviewed the patient's new imaging results and agree with the interpretation.    Microbiology Results (last 10 days)       Procedure Component Value - Date/Time    Blood Culture - Blood, Arm, Right [[id number]]  (Normal) Collected: 10/20/24 1130    Lab Status: Preliminary result Specimen: Blood from Arm, Right Updated: 10/21/24 1145     Blood Culture No growth at 24 hours    COVID-19, FLU A/B, RSV PCR 1 HR TAT - Swab, Nasopharynx [[id number]]  (Normal) Collected: 10/20/24 1130    Lab Status: Final result Specimen: Swab from Nasopharynx Updated: 10/20/24 1220     COVID19 Not Detected     Influenza A PCR Not Detected     Influenza B PCR Not Detected     RSV, PCR Not Detected    Narrative:      Fact sheet for providers: https://www.fda.gov/media/880897/download    Fact sheet for patients: https://www.fda.gov/media/800500/download    Test performed by PCR.    Blood Culture - Blood, Arm, Right [[id number]]  (Normal) Collected: 10/20/24 1110    Lab Status: Preliminary result Specimen: Blood from Arm, Right Updated: 10/21/24 1145     Blood Culture No growth at 24 hours           Assessment & Plan         Abnormal CT chest-patient already has a diagnosis of lung cancer and will be going for chemo and radiation.    Procalitonin Results:      Lab 10/20/24  1110   PROCALCITONIN 0.08      Continue current antibiotics.    PT OT to avoid deconditioning    Speech and swallow to make sure patient is not aspirating.    Follow-up with hematology oncology on the outpatient basis for the  treatment of squamous cell lung cancer.    Cardiology- hemodynamically -stable.  Continue current treatment  Continue to monitor HR- rate and rhythm, BP     Nephrology- Cr and BUN stable  I/O-ins and outs reviewed    GI-continue current diet.    Hematology- CBC  Hb-transfuse for less than 7  platelet  WBC    ID-procalcitonin being normal.  Microbiology negative.  Can consider discontinuing antibiotics.    Endrocrinology-diabetes mellitus-maintain Blood sugar 140 -180    Electrolytes-   Mag and phos       DVT prophylaxis-continue        Family member present-none                Inability to walk          Boyd Del Real MD  10/21/24  12:44 EDT

## 2024-10-21 NOTE — PAYOR COMM NOTE
"CONTACT: AYAAN NAIK RN  UTILIZATION MANAGEMENT DEPT.  [facility]  [address] [place] [address]  PHONE: [phone]  FAX: [phone]      OBSERVATION AUTH REQUEST    REF#[id number]    Albin Andrade (84 y.o. Male)       Date of Birth   1940    Social Security Number       Address   [address]  [facility] [address]    Home Phone   [phone]    MRN   [id number]       Spiritism   None    Marital Status   Single                            Admission Date   10/20/24    Admission Type   Emergency    Admitting Provider   Dakota Bruce MD    Attending Provider   Nurys Campos MD    Department, Room/Bed   [place] [id number]/       Discharge Date       Discharge Disposition       Discharge Destination                                 Attending Provider: Nurys Campos MD    Allergies: No Known Allergies    Isolation: None   Infection: None   Code Status: CPR    Ht: 175.3 cm (69\")   Wt: 86.5 kg (190 lb 11.2 oz)    Admission Cmt: None   Principal Problem: Inability to walk [R26.2]                   Active Insurance as of 10/20/2024       Primary Coverage       Payor Plan Insurance Group Employer/Plan Group    [employer]        Payor Plan Address Payor Plan Phone Number Payor Plan Fax Number Effective Dates    [address] [phone]  12/1/2022 - None Entered    [facility] [id number]         Subscriber Name Subscriber Birth Date Member ID       ALBIN ANDRADE 1940 [id number]               Secondary Coverage       Payor Plan Insurance Group Employer/Plan Group    [facility] [place] DEPT [id number]        Payor Plan Address Payor Plan Phone Number Payor Plan Fax Number Effective Dates    [facility] OFFICE OF COMMUNITY CARE [phone]  5/21/2021 - None Entered    [address] [address]       [place] [address]         Subscriber Name Subscriber Birth Date Member ID       ALBIN ANDRADE 1940 [id number]               Tertiary " Coverage       Payor Plan Insurance Group Employer/Plan Group    [employer] MEDICARE REPLACEMENT [employer] MED ADV PPO [id number]       Payor Plan Address Payor Plan Phone Number Payor Plan Fax Number Effective Dates    [address] [address] [phone]  2023 - None Entered    [facility] [address]         Subscriber Name Subscriber Birth Date Member ID       ALBIN ANDRADE 1940 [id number]                     Emergency Contacts        (Rel.) Home Phone Work Phone Mobile Phone    PERRY ANDRADE (Daughter) -- -- [phone]                 History & Physical        Andrade Jensen APRN at 10/20/24 2037       Attestation signed by Dakota Bruce MD at 10/21/24 9388    I have reviewed this documentation and agree.  I have discussed and formulated the assessment and plan with PHIL Jones.  I have also discussed the patient with the ED attending and reviewed the patient's past medical history, labs, vital signs, imaging, and EKGs.    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  Will order PT, OT, speech, and case management/.  Will trend the sodium levels and place him on a 1000 mL fluid restriction.                     [facility] Medicine Services  HISTORY & PHYSICAL    Patient Identification:  Name:  Albin Andrade  Age:  84 y.o.  Sex:  male  :  1940  MRN:  [id number]   Visit Number:  [id number]  Admit Date: 10/20/2024   Primary Care Physician:  Provider, No Known     Subjective     Chief complaint:   Chief Complaint   Patient presents with    Shortness of Breath     History of presenting illness:   Patient is a 84 y.o. male with past medical history significant for diabetes, MI, and lung  cancer that presented to the [facility] emergency department for evaluation of shortness of breath.  Patient states that he has had some generalized weakness for the past month.  He advises that 2 days ago he became weaker and unable to ambulate on his own.  The patient denies any chest pain or abdominal pain.  The patient states that he is struggling completing daily living activities due to his weakness.  On my examination, patient is alert and oriented x 3.  No acute distress noted.  Patient does have ROM, however patient is struggling with overall strength of movement.  The patient is in stable condition on admission to the hospital. Upon arrival to the ED, vitals were temperature 97.5, /82, heart rate 90, respirations 19, SpO2 95%. Labs obtained on arrival: pO2 68, initial troponin 27, reflex troponin 29.  proBNP 5383, sodium 124, chloride 88, glucose 163, CRP 12.93, PT 20.2, INR 1.71, PTT 53.4, WBC 14.19, hemoglobin 9.0, and hematocrit 29.9. Patient has been admitted to the medical/surgical unit for further evaluation and treatment.     Present during exam: RN  ---------------------------------------------------------------------------------------------------------------------   Review of Systems   Constitutional:  Positive for fatigue. Negative for chills and fever.   HENT: Negative.  Negative for congestion, sore throat and trouble swallowing.    Eyes: Negative.    Respiratory: Negative.  Negative for cough, shortness of breath and wheezing.    Cardiovascular: Negative.  Negative for chest pain, palpitations and leg swelling.   Gastrointestinal: Negative.  Negative for abdominal pain, diarrhea, nausea and vomiting.   Endocrine: Negative.    Genitourinary: Negative.  Negative for dysuria.   Musculoskeletal: Negative.  Negative for neck pain and neck stiffness.   Skin: Negative.    Allergic/Immunologic: Negative.    Neurological:  Positive for weakness. Negative for dizziness, tremors,  seizures, syncope, facial asymmetry, speech difficulty, light-headedness, numbness and headaches.   Hematological: Negative.    Psychiatric/Behavioral: Negative.         Otherwise 10-system ROS reviewed and is negative except as mentioned in the HPI.    ---------------------------------------------------------------------------------------------------------------------   History reviewed. No pertinent past medical history.  History reviewed. No pertinent surgical history.  History reviewed. No pertinent family history.  Social History     Socioeconomic History    Marital status: Single   Tobacco Use    Smoking status: Former     Current packs/day: 0.00     Types: Cigarettes     Quit date:      Years since quittin.8     Passive exposure: Past    Smokeless tobacco: Never   Vaping Use    Vaping status: Never Used   Substance and Sexual Activity    Alcohol use: Never    Drug use: Never    Sexual activity: Defer     ---------------------------------------------------------------------------------------------------------------------   Allergies:  Patient has no known allergies.  ---------------------------------------------------------------------------------------------------------------------   Medications below are reported home medications pulling from within the system; at this time, these medications have not been reconciled unless otherwise specified and are in the verification process for further verifcation as current home medications.    Prior to Admission Medications       None          ---------------------------------------------------------------------------------------------------------------------    Objective     Hospital Scheduled Meds:  insulin lispro, 2-7 Units, Subcutaneous, 4x Daily AC & at Bedtime  sodium chloride, 10 mL, Intravenous, Q12H             Current listed hospital scheduled medications may not yet reflect those currently placed in orders that are signed and held, awaiting  patient's arrival to floor/unit.    ---------------------------------------------------------------------------------------------------------------------   Vital Signs:  Temp:  [97.5 °F (36.4 °C)-98.7 °F (37.1 °C)] 98.7 °F (37.1 °C)  Heart Rate:  [76-96] 83  Resp:  [17-20] 18  BP: (101-140)/(59-95) 101/59  Mean Arterial Pressure (Non-Invasive) for the past 24 hrs (Last 3 readings):   Noninvasive MAP (mmHg)   10/20/24 2116 100   10/20/24 2059 86   10/20/24 2044 98     SpO2 Percentage    10/20/24 2059 10/20/24 2116 10/20/24 2240   SpO2: 98% 96% 95%     SpO2:  [92 %-98 %] 95 %  on   ;   Device (Oxygen Therapy): room air    Body mass index is 28.16 kg/m².  Wt Readings from Last 3 Encounters:   10/20/24 86.5 kg (190 lb 11.2 oz)   08/26/24 90.7 kg (200 lb)   08/31/23 99.8 kg (220 lb)       ---------------------------------------------------------------------------------------------------------------------   Physical Exam  Vitals and nursing note reviewed.   Constitutional:       General: He is awake. He is not in acute distress.     Appearance: Normal appearance. He is normal weight. He is not ill-appearing or diaphoretic.   HENT:      Head: Normocephalic and atraumatic.      Nose: Nose normal.      Mouth/Throat:      Mouth: Mucous membranes are moist.      Pharynx: Oropharynx is clear.   Eyes:      Extraocular Movements: Extraocular movements intact.      Pupils: Pupils are equal, round, and reactive to light.   Cardiovascular:      Rate and Rhythm: Normal rate and regular rhythm.      Pulses: Normal pulses.           Dorsalis pedis pulses are 2+ on the right side and 2+ on the left side.      Heart sounds: Normal heart sounds. No murmur heard.     No friction rub.   Pulmonary:      Effort: Pulmonary effort is normal. No accessory muscle usage, respiratory distress or retractions.      Breath sounds: Normal breath sounds. No wheezing, rhonchi or rales.   Abdominal:      General: Bowel sounds are normal. There is no  distension.      Palpations: Abdomen is soft.      Tenderness: There is no abdominal tenderness. There is no guarding.   Musculoskeletal:         General: Normal range of motion.      Cervical back: Normal range of motion and neck supple. No rigidity.      Right lower leg: No edema.      Left lower leg: No edema.   Skin:     General: Skin is warm and dry.      Capillary Refill: Capillary refill takes 2 to 3 seconds.   Neurological:      General: No focal deficit present.      Mental Status: He is alert and oriented to person, place, and time. Mental status is at baseline.      Cranial Nerves: No dysarthria or facial asymmetry.      Sensory: Sensation is intact.      Motor: No weakness or tremor.   Psychiatric:         Attention and Perception: Attention normal.         Mood and Affect: Mood normal.         Speech: Speech normal.         Behavior: Behavior normal. Behavior is cooperative.         Thought Content: Thought content normal.         Cognition and Memory: Cognition normal.         Judgment: Judgment normal.          ---------------------------------------------------------------------------------------------------------------------  EKG: Atrial fibrillation, unconfirmed by cardiology      Telemetry:    Atrial fibrillation    I have personally reviewed the EKG/Telemetry strip  ---------------------------------------------------------------------------------------------------------------------   Results from last 7 days   Lab Units 10/20/24  1309 10/20/24  1110   HSTROP T ng/L 29* 27*     Results from last 7 days   Lab Units 10/20/24  1110   PROBNP pg/mL 5,383.0*       Results from last 7 days   Lab Units 10/20/24  1112   PH, ARTERIAL pH units 7.430   PO2 ART mm Hg 68.0*   PCO2, ARTERIAL mm Hg 38.0   HCO3 ART mmol/L 25.2     Results from last 7 days   Lab Units 10/20/24  1110   CRP mg/dL 12.93*   LACTATE mmol/L 2.0   WBC 10*3/mm3 14.19*   HEMOGLOBIN g/dL 9.0*   HEMATOCRIT % 29.9*   MCV fL 86.9   MCHC g/dL  "30.1*   PLATELETS 10*3/mm3 433   INR  1.71*     Results from last 7 days   Lab Units 10/20/24  1110   SODIUM mmol/L 124*   POTASSIUM mmol/L 4.6   CHLORIDE mmol/L 88*   CO2 mmol/L 25.5   BUN mg/dL 16   CREATININE mg/dL 0.93   CALCIUM mg/dL 10.3   GLUCOSE mg/dL 163*   ALBUMIN g/dL 3.1*   BILIRUBIN mg/dL 0.3   ALK PHOS U/L 93   AST (SGOT) U/L 24   ALT (SGPT) U/L 28   Estimated Creatinine Clearance: 65.5 mL/min (by C-G formula based on SCr of 0.93 mg/dL).  No results found for: \"AMMONIA\"    Glucose   Date/Time Value Ref Range Status   10/20/2024 2207 195 (H) 70 - 130 mg/dL Final   10/20/2024 2103 154 (H) 70 - 130 mg/dL Final   10/20/2024 2005 58 (L) 70 - 130 mg/dL Final     No results found for: \"HGBA1C\"  No results found for: \"TSH\", \"FREET4\"    Microbiology Results (last 10 days)       Procedure Component Value - Date/Time    COVID-19, FLU A/B, RSV PCR 1 HR TAT - Swab, Nasopharynx [[id number]]  (Normal) Collected: 10/20/24 1130    Lab Status: Final result Specimen: Swab from Nasopharynx Updated: 10/20/24 1220     COVID19 Not Detected     Influenza A PCR Not Detected     Influenza B PCR Not Detected     RSV, PCR Not Detected    Narrative:      Fact sheet for providers: https://www.fda.gov/media/105913/download    Fact sheet for patients: https://www.fda.gov/media/041081/download    Test performed by PCR.           Pain Management Panel          Latest Ref Rng & Units 9/1/2023   Pain Management Panel   Amphetamine, Urine Qual Negative Negative    Barbiturates Screen, Urine Negative Negative    Benzodiazepine Screen, Urine Negative Negative    Buprenorphine, Screen, Urine Negative Negative    Cocaine Screen, Urine Negative Negative    Fentanyl, Urine Negative Negative    Methadone Screen , Urine Negative Negative    Methamphetamine, Ur Negative Negative       Details                 I have personally reviewed the above laboratory results. "   ---------------------------------------------------------------------------------------------------------------------  Imaging Results (Last 7 Days)       Procedure Component Value Units Date/Time    CT Chest Without Contrast Diagnostic [[id number]] Collected: 10/20/24 1528     Updated: 10/20/24 1535    Narrative:      PROCEDURE: CT of the chest performed without IV contrast on October 20, 2024. The examination was performed with 3 mm axial imaging and 3 mm  sagittal and coronal reconstruction images. The examination was  performed according to as low as reasonably achievable dose protocol.  Total . The examination was performed according to as low as  reasonably achievable dose protocol.     HISTORY: Shortness of breath.     COMPARISON: None.     FINDINGS:     Enlarged heart size  Moderate size left pleural effusion.  Small size right pleural effusion.  Emphysematous changes in the upper lobes.  Airspace consolidation with central cavitation in the superior segment  of the left lower lobe consistent with pneumonia.  Airspace consolidation and atelectasis and scarring also noted in the  lower lingula.  Contribution from underlying mass lesion and neoplasm in the left lower  lobe is not excluded.  Focal airspace consolidation with spiculated margins in the anterior  aspect of the left upper lobe adjacent to the pleural margin and seen  best on image 34, series 3.  Markedly enlarged mediastinal lymph nodes suspicious for metastatic  disease to the mediastinum. Markedly enlarged AP window and subcarinal  and left hilar adenopathy.  Normal thyroid gland.  Sternotomy.  No pericardial effusion.  Coronary arterial vascular calcifications.  Moderate volume of dense calcified plaque along the aortic arch and  descending thoracic aortic segments.  Small cyst in the left hepatic lobe.       Impression:         1.  Heterogeneous area of consolidation in the superior segment of the  left lower lobe with central  cavitation consistent with pneumonia with  probable contribution from underlying mass lesion/neoplasm.  2.  Markedly enlarged mediastinal lymph nodes including subcarinal and  AP window lymph nodes with heterogeneous attenuation suggestive of  underlying metastatic disease and neoplastic involvement.  3.  Enlarged heart size.  4.  Small right pleural effusion.  5.  Moderate size left pleural effusion.  6.  Focal area of consolidation in the anterior aspect of the left upper  lobe with spiculated margins suspicious for additional mass lesion.  7.  Emphysema.  8.  No pneumothorax.  9.  Small left hepatic cyst.  10.  Degenerative disc disease throughout the thoracic spine with  endplate and facet hypertrophic changes.        This report was finalized on 10/20/2024 3:33 PM by Chico Modi MD.       CT Abdomen Pelvis Without Contrast [[id number]] Collected: 10/20/24 1235     Updated: 10/20/24 1242    Narrative:      PROCEDURE: CT of the abdomen and pelvis performed without IV contrast on  October 20, 2024. The examination was performed with 4 mm axial imaging  and 4 mm sagittal and coronal reconstruction images. The examination was  performed according to as low as reasonably achievable dose protocol.     HISTORY: Abdominal pain.     COMPARISON: None.     FINDINGS:     Enlarged heart size.  Small size right pleural effusion.  Moderate size left pleural effusion.  Compressive atelectasis at the lung bases, left greater than right.  Sternotomy.  No pericardial effusion.  Mild atelectasis also noted in the lower lingula.  No acute process seen in the liver, pancreas, adrenal glands, and  kidneys.  A normal appendix is well seen.  Mild constipation throughout the colon.  Mild enlarged prostate gland.  Mild osteoarthritis at the right and left hip joint.  Multilevel degenerative disc disease throughout the lumbar spine with  endplate and facet hypertrophic changes.  No free fluid or free air. No abscess or hematoma.  Small  umbilical hernia contains only fat.  Small left hepatic cyst.  Mild ectatic infrarenal abdominal aorta up to 2.69 cm in diameter.  No retroperitoneal hemorrhage.  Renal vascular calcifications on the right and left side.  1 mm calcification at the lower pole of the left kidney seen best on  image 50, series 2 is nonobstructive.       Impression:         1.  Mild enlarged heart size.  2.  Bilateral pleural effusions, left greater than right with some mild  compressive atelectasis at the lung bases.  3.  Normal appendix is well seen.  4.  Slightly ectatic infrarenal abdominal aortic segment.  5.  Small left hepatic cyst.  6.  1 mm calcification in the lower pole of the left kidney.  7.  Degenerative disc disease throughout the lumbar spine with endplate  and facet hypertrophic changes.  8.  Fluid-filled distended bladder.  9.  Mild enlarged prostate gland.  10.  Mild colon and sigmoid colon diverticulosis.  11.  Mild constipation throughout the colon.  12.  No features of enterocolitis.  13.  No diverticulitis.  14.  No free fluid or free air.  15.  No abscess or hematoma.     This report was finalized on 10/20/2024 12:40 PM by Chico Modi MD.       XR Chest AP [[id number]] Collected: 10/20/24 1222     Updated: 10/20/24 1239    Narrative:      PROCEDURE: Chest x-ray examination performed on October 20, 2024. Single  view. Upright position.     HISTORY: Shortness of breath.     COMPARISON: None.     FINDINGS:     Enlarged heart size  Sternotomy.  Coarsened bronchovascular pattern to the lungs.  Airspace consolidation in the left lower lobe and lower lingula  suggestive of underlying multifocal pneumonia.  No edema.  Small left pleural effusion.   No pneumothorax.       Impression:      Impression:     1.  Enlarged heart size.  2.  Airspace disease in the left lower lobe and lower lingula.  3.  Small left pleural effusion.  4.  Sternotomy.  5.  Coarsened bronchovascular pattern to the lungs  6.  No pneumothorax.         This report was finalized on 10/20/2024 12:24 PM by Chico Modi MD.             I have personally reviewed the above radiology results.     Last Echocardiogram:  No echocardiogram on file  ---------------------------------------------------------------------------------------------------------------------    Assessment & Plan      ACUTE HOSPITAL PROBLEMS    -Acute physical debility, on admission  -Acute on chronic anemia, on admission    CMP and CBC in the a.m.  Physical therapy consult placed  Occupational Therapy consult placed  Monitor hemoglobin level with a.m. labs  No signs of active bleeding    -F/E/N  Replace electrolytes per protocol as necessary.  Consistent carb diet.     CHRONIC MEDICAL PROBLEMS    -Diabetes  -Squamous cell lung cancer  -Basal cell carcinoma  -Macular degeneration  -History of MI  -Diabetic retinopathy    Vital signs per hospital policy  Insulin sliding scale  Blood glucose checks before meals and at bedtime  Continue home medication regimen on pharmacy reconciliation  ---------------------------------------------------  Activity: Bedrest  ---------------------------------------------------  The patient is considered to be a high risk patient due to: Past medical history.    OBSERVATION status; however, if further evaluation or treatment plans warrant, status may change.  Based upon current information, I predict patient's care encounter to be less than or equal to 2 midnights     Code Status: Full code  ---------------------------------------------------  Disposition/Discharge planning: Consult case management for discharge planning.  ---------------------------------------------------  I have discussed the patient's assessment and plan with attending physician Dakota Valle MD Carl B Gray, APRN     10/21/24  00:25 EDT    Attending Physician: Dakota Bruce MD       Electronically signed by Dakota Bruce MD at 10/21/24 0548          Emergency Department Notes         Luke Álvarez PCT at 10/20/24 2310          [facility]ACESHEET AND [facility]FUSAL [facility] FORM FAXED TO [phone]    Electronically signed by Luke Álvarez PCT at 10/20/24 2311       David Lopez at 10/20/24 2105          Tp280oa/dl provider and rn aware     Electronically signed by David Lopez at 10/20/24 2105       Bryan Retana PCT at 10/20/24 2007          POC glucose was 58 mg/dL. RN notified      Electronically signed by Bryan Retana PCT at 10/20/24 2007       David Lopez at 10/20/24 1903          Contacted [facility]. Took info and advised would call back after 2000 due to shift change. Provider aware    Electronically signed by David Lopez at 10/20/24 1904       Mckinley Knox, APRN at 10/20/24 1056          Subjective   History of Present Illness  Patient is a 84-year-old male who presents to the emergency room today for shortness of breath, nausea, and abdominal pain.  Patient reports that he has been short of breath for several days and states he continues to get more unwell.  Patient also reports vomiting when he attempts to eat.  Patient reports that been going on for some time.  Patient reports that he lives alone and states [facility] to get help with care.  Patient denies chest pain.  Patient denies fever.  Patient does report having hard time getting up.    Shortness of Breath      Review of Systems   HENT: Negative.     Eyes: Negative.    Respiratory:  Positive for shortness of breath.    Cardiovascular: Negative.    Gastrointestinal: Negative.    Endocrine: Negative.    Genitourinary: Negative.    Musculoskeletal: Negative.    Skin: Negative.    Allergic/Immunologic: Negative.    Neurological:  Positive for weakness.   Hematological: Negative.    Psychiatric/Behavioral: Negative.         History reviewed. No pertinent past medical history.    No Known Allergies    No past surgical history on  file.    History reviewed. No pertinent family history.    Social History     Socioeconomic History    Marital status: Single           Objective   Physical Exam  Vitals and nursing note reviewed.   Constitutional:       Appearance: He is well-developed.   HENT:      Head: Normocephalic.      Right Ear: External ear normal.      Left Ear: External ear normal.   Eyes:      Conjunctiva/sclera: Conjunctivae normal.      Pupils: Pupils are equal, round, and reactive to light.   Cardiovascular:      Rate and Rhythm: Normal rate and regular rhythm.      Heart sounds: Normal heart sounds.   Pulmonary:      Effort: Pulmonary effort is normal.      Breath sounds: Wheezing present.   Abdominal:      General: Bowel sounds are normal.      Palpations: Abdomen is soft.   Musculoskeletal:         General: Normal range of motion.      Cervical back: Normal range of motion and neck supple.   Skin:     General: Skin is warm and dry.      Capillary Refill: Capillary refill takes less than 2 seconds.   Neurological:      Mental Status: He is alert and oriented to person, place, and time.   Psychiatric:         Behavior: Behavior normal.         Thought Content: Thought content normal.         Procedures          ED Course  ED Course as of 10/20/24 2022   Sun Oct 20, 2024   1232 EKG performed on 10/20/2024 at 1055 shows atrial fibrillation, rate 91, QRS 96.  QRS 96.  No STEMI.  Electronically signed by Alexis Chaudhry DO, 10/20/24, 12:32 PM EDT.   [NG]   1953 Patient continues to request to be admitted.  [facility] patient [facility] but [facility] and request to stay here in the hospital. [ROSALVA]      ED Course User Index  [ROSALVA] Mckinley Knox, APRN  [NG] Alexis Chaudhry DO                                             Medical Decision Making  MDM:    Escalation of care including admission/observation considered    - Discussions of management with other providers:  None and  Hospitalist    - Discussed/reviewed with Radiology regarding test interpretation    - Independent interpretation: None    - Additional patient history obtained from: None    - Review of external non-ED record (if available):  Prior Inpt record, Office record, Outpt record, Prior Outpt labs, PCP record, Outside ED record, Other    - Chronic conditions affecting care: See HPI and medical Hx.    - Social Determinants of health significantly affecting care:  None        Medical Decision Making Discussion:    Patient is a 84-year-old male who presents to the emergency room today for shortness of breath, nausea, and abdominal pain.  Patient reports that he has been short of breath for several days and states he continues to get more unwell.  Patient also reports vomiting when he attempts to eat.  Patient reports that been going on for some time.  Patient reports that he lives alone and states that [facility] or be admitted to get help with care.  Patient denies chest pain.  Patient denies fever.  Patient does report having hard time getting up.    Patient was admitted under care of hospitalist.       The patient has been given very strict return precautions to return to the emergency department should there be any acute change or worsening of their condition.  I have explained my findings and the patient has expressed understanding to me.  I explained that the work-up performed in the ED has been based on the specific complaint and concern, as the nature of emergency medicine is complaint driven and they understand that new symptoms may arise.  I have told them that, should there be any new symptoms, worsening or changing symptoms, a new work-up may be indicated that they are encouraged to return to the emergency department or promptly contact their primary care physician. We have employed a shared decision-making process as the discussion of their disposition.  The patient has been educated as to the  nature of the visit, the tests and work-up performed and the findings from today's visit. At this time, there does not appear to be any acute emergent process that necessitates admission to the hospital, however, the patient understands that this can change unexpectedly. At this time, the patient is stable for discharge home and agrees to follow-up with her primary care physician in the next 24 to 48 hours or earlier should they be able to obtain an appointment.    The patient was counseled regarding diagnostic results and treatment plan and patient has indicated understanding of these instructions.     Problems Addressed:  Pneumonia of left lung due to infectious organism, unspecified part of lung: complicated acute illness or injury    Amount and/or Complexity of Data Reviewed  Labs: ordered. Decision-making details documented in ED Course.  Radiology: ordered. Decision-making details documented in ED Course.  ECG/medicine tests: ordered.    Risk  Prescription drug management.  Decision regarding hospitalization.      Results for orders placed or performed during the hospital encounter of 10/20/24   ECG 12 Lead Dyspnea    Collection Time: 10/20/24 10:55 AM   Result Value Ref Range    QT Interval 402 ms    QTC Interval 494 ms   Comprehensive Metabolic Panel    Collection Time: 10/20/24 11:10 AM    Specimen: Arm, Right; Blood   Result Value Ref Range    Glucose 163 (H) 65 - 99 mg/dL    BUN 16 8 - 23 mg/dL    Creatinine 0.93 0.76 - 1.27 mg/dL    Sodium 124 (L) 136 - 145 mmol/L    Potassium 4.6 3.5 - 5.2 mmol/L    Chloride 88 (L) 98 - 107 mmol/L    CO2 25.5 22.0 - 29.0 mmol/L    Calcium 10.3 8.6 - 10.5 mg/dL    Total Protein 7.6 6.0 - 8.5 g/dL    Albumin 3.1 (L) 3.5 - 5.2 g/dL    ALT (SGPT) 28 1 - 41 U/L    AST (SGOT) 24 1 - 40 U/L    Alkaline Phosphatase 93 39 - 117 U/L    Total Bilirubin 0.3 0.0 - 1.2 mg/dL    Globulin 4.5 gm/dL    A/G Ratio 0.7 g/dL    BUN/Creatinine Ratio 17.2 7.0 - 25.0    Anion Gap 10.5 5.0 -  15.0 mmol/L    eGFR 81.5 >60.0 mL/min/1.73   Protime-INR    Collection Time: 10/20/24 11:10 AM    Specimen: Arm, Right; Blood   Result Value Ref Range    Protime 20.2 (H) 12.1 - 14.7 Seconds    INR 1.71 (H) 0.90 - 1.10   aPTT    Collection Time: 10/20/24 11:10 AM    Specimen: Arm, Right; Blood   Result Value Ref Range    PTT 53.4 (H) 26.5 - 34.5 seconds   Lactic Acid, Plasma    Collection Time: 10/20/24 11:10 AM    Specimen: Arm, Right; Blood   Result Value Ref Range    Lactate 2.0 0.5 - 2.0 mmol/L   C-reactive Protein    Collection Time: 10/20/24 11:10 AM    Specimen: Arm, Right; Blood   Result Value Ref Range    C-Reactive Protein 12.93 (H) 0.00 - 0.50 mg/dL   Procalcitonin    Collection Time: 10/20/24 11:10 AM    Specimen: Arm, Right; Blood   Result Value Ref Range    Procalcitonin 0.08 0.00 - 0.25 ng/mL   CBC Auto Differential    Collection Time: 10/20/24 11:10 AM    Specimen: Arm, Right; Blood   Result Value Ref Range    WBC 14.19 (H) 3.40 - 10.80 10*3/mm3    RBC 3.44 (L) 4.14 - 5.80 10*6/mm3    Hemoglobin 9.0 (L) 13.0 - 17.7 g/dL    Hematocrit 29.9 (L) 37.5 - 51.0 %    MCV 86.9 79.0 - 97.0 fL    MCH 26.2 (L) 26.6 - 33.0 pg    MCHC 30.1 (L) 31.5 - 35.7 g/dL    RDW 18.1 (H) 12.3 - 15.4 %    RDW-SD 58.2 (H) 37.0 - 54.0 fl    MPV 8.1 6.0 - 12.0 fL    Platelets 433 140 - 450 10*3/mm3    Neutrophil % 89.2 (H) 42.7 - 76.0 %    Lymphocyte % 3.0 (L) 19.6 - 45.3 %    Monocyte % 6.6 5.0 - 12.0 %    Eosinophil % 0.1 (L) 0.3 - 6.2 %    Basophil % 0.1 0.0 - 1.5 %    Immature Grans % 1.0 (H) 0.0 - 0.5 %    Neutrophils, Absolute 12.65 (H) 1.70 - 7.00 10*3/mm3    Lymphocytes, Absolute 0.43 (L) 0.70 - 3.10 10*3/mm3    Monocytes, Absolute 0.93 (H) 0.10 - 0.90 10*3/mm3    Eosinophils, Absolute 0.02 0.00 - 0.40 10*3/mm3    Basophils, Absolute 0.02 0.00 - 0.20 10*3/mm3    Immature Grans, Absolute 0.14 (H) 0.00 - 0.05 10*3/mm3    nRBC 0.0 0.0 - 0.2 /100 WBC   High Sensitivity Troponin T    Collection Time: 10/20/24 11:10 AM     Specimen: Arm, Right; Blood   Result Value Ref Range    HS Troponin T 27 (H) <22 ng/L   BNP    Collection Time: 10/20/24 11:10 AM    Specimen: Arm, Right; Blood   Result Value Ref Range    proBNP 5,383.0 (H) 0.0 - 1,800.0 pg/mL   Green Top (Gel)    Collection Time: 10/20/24 11:10 AM   Result Value Ref Range    Extra Tube Hold for add-ons.    Lavender Top    Collection Time: 10/20/24 11:10 AM   Result Value Ref Range    Extra Tube hold for add-on    Gold Top - SST    Collection Time: 10/20/24 11:10 AM   Result Value Ref Range    Extra Tube Hold for add-ons.    Light Blue Top    Collection Time: 10/20/24 11:10 AM   Result Value Ref Range    Extra Tube Hold for add-ons.    Blood Gas, Arterial With Co-Ox    Collection Time: 10/20/24 11:12 AM    Specimen: Arterial Blood   Result Value Ref Range    Site Left Radial     Sean's Test Positive     pH, Arterial 7.430 7.350 - 7.450 pH units    pCO2, Arterial 38.0 35.0 - 45.0 mm Hg    pO2, Arterial 68.0 (L) 83.0 - 108.0 mm Hg    HCO3, Arterial 25.2 20.0 - 26.0 mmol/L    Base Excess, Arterial 0.9 0.0 - 2.0 mmol/L    O2 Saturation, Arterial 93.8 (L) 94.0 - 99.0 %    Hemoglobin, Blood Gas 9.2 (L) 14 - 18 g/dL    Hematocrit, Blood Gas 28.2 (L) 38.0 - 51.0 %    Oxyhemoglobin 92.1 (L) 94 - 99 %    Methemoglobin 0.20 0.00 - 3.00 %    Carboxyhemoglobin 1.6 0 - 5 %    A-a DO2 34.1 0.0 - 300.0 mmHg    CO2 Content 26.4 22 - 33 mmol/L    Barometric Pressure for Blood Gas 738 mmHg    Modality Room Air     FIO2 21 %    Ventilator Mode NA     Collected by [id number]     pH, Temp Corrected      pCO2, Temperature Corrected      pO2, Temperature Corrected     COVID-19, FLU A/B, RSV PCR 1 HR TAT - Swab, Nasopharynx    Collection Time: 10/20/24 11:30 AM    Specimen: Nasopharynx; Swab   Result Value Ref Range    COVID19 Not Detected Not Detected - Ref. Range    Influenza A PCR Not Detected Not Detected    Influenza B PCR Not Detected Not Detected    RSV, PCR Not Detected Not Detected   High Sensitivity  Troponin T 2Hr    Collection Time: 10/20/24  1:09 PM    Specimen: Arm, Left; Blood   Result Value Ref Range    HS Troponin T 29 (H) <22 ng/L    Troponin T Delta 2 >=-4 - <+4 ng/L   POC Glucose Once    Collection Time: 10/20/24  8:05 PM    Specimen: Blood   Result Value Ref Range    Glucose 58 (L) 70 - 130 mg/dL     CT Chest Without Contrast Diagnostic   Final Result       1.  Heterogeneous area of consolidation in the superior segment of the   left lower lobe with central cavitation consistent with pneumonia with   probable contribution from underlying mass lesion/neoplasm.   2.  Markedly enlarged mediastinal lymph nodes including subcarinal and   AP window lymph nodes with heterogeneous attenuation suggestive of   underlying metastatic disease and neoplastic involvement.   3.  Enlarged heart size.   4.  Small right pleural effusion.   5.  Moderate size left pleural effusion.   6.  Focal area of consolidation in the anterior aspect of the left upper   lobe with spiculated margins suspicious for additional mass lesion.   7.  Emphysema.   8.  No pneumothorax.   9.  Small left hepatic cyst.   10.  Degenerative disc disease throughout the thoracic spine with   endplate and facet hypertrophic changes.           This report was finalized on 10/20/2024 3:33 PM by Chico Modi MD.          XR Chest AP   Final Result   Impression:       1.  Enlarged heart size.   2.  Airspace disease in the left lower lobe and lower lingula.   3.  Small left pleural effusion.   4.  Sternotomy.   5.  Coarsened bronchovascular pattern to the lungs   6.  No pneumothorax.           This report was finalized on 10/20/2024 12:24 PM by Chico Modi MD.          CT Abdomen Pelvis Without Contrast   Final Result       1.  Mild enlarged heart size.   2.  Bilateral pleural effusions, left greater than right with some mild   compressive atelectasis at the lung bases.   3.  Normal appendix is well seen.   4.  Slightly ectatic infrarenal abdominal  aortic segment.   5.  Small left hepatic cyst.   6.  1 mm calcification in the lower pole of the left kidney.   7.  Degenerative disc disease throughout the lumbar spine with endplate   and facet hypertrophic changes.   8.  Fluid-filled distended bladder.   9.  Mild enlarged prostate gland.   10.  Mild colon and sigmoid colon diverticulosis.   11.  Mild constipation throughout the colon.   12.  No features of enterocolitis.   13.  No diverticulitis.   14.  No free fluid or free air.   15.  No abscess or hematoma.       This report was finalized on 10/20/2024 12:40 PM by Chico Modi MD.                Final diagnoses:   Pneumonia of left lung due to infectious organism, unspecified part of lung       ED Disposition  ED Disposition       ED Disposition   Decision to Admit    Condition   --    Comment   --               No follow-up provider specified.       Medication List      No changes were made to your prescriptions during this visit.            Mckinley Knox APRN  10/20/24 2022      Electronically signed by Mckinley Knox APRN at 10/20/24 2022       Facility-Administered Medications as of 10/21/2024   Medication Dose Route Frequency Provider Last Rate Last Admin    apixaban (ELIQUIS) tablet 5 mg  5 mg Oral BID Dakota Bruce MD        ascorbic acid (VITAMIN C) tablet 250 mg  250 mg Oral Daily Dakota Bruce MD        atorvastatin (LIPITOR) tablet 20 mg  20 mg Oral Nightly Dakota Bruce MD        sennosides-docusate (PERICOLACE) 8.6-50 MG per tablet 2 tablet  2 tablet Oral BID PRN Dakota Bruce MD        And    polyethylene glycol (MIRALAX) packet 17 g  17 g Oral Daily PRN Dakota Bruce MD        And    bisacodyl (DULCOLAX) EC tablet 5 mg  5 mg Oral Daily PRN Dakota Bruce MD        And    bisacodyl (DULCOLAX) suppository 10 mg  10 mg Rectal Daily PRN Dakota Bruce MD        cefTRIAXone (ROCEPHIN) 1 g in sodium chloride 0.9 % 100 mL IVPB-VTB  1 g Intravenous Q24H  Dakota Bruce MD   Stopped at 10/21/24 0718    [COMPLETED] cefTRIAXone (ROCEPHIN) 1,000 mg in sodium chloride 0.9 % 100 mL IVPB-VTB  1,000 mg Intravenous Once Mckinley Knox APRN   Stopped at 10/20/24 1525    [COMPLETED] dextrose (D50W) (25 g/50 mL) IV injection 25 g  25 g Intravenous Once Mckinley Knox APRN   25 g at 10/20/24 2011    dextrose (D50W) (25 g/50 mL) IV injection 25 g  25 g Intravenous Q15 Min PRN Andrade Jensen APRN        dextrose (GLUTOSE) oral gel 15 g  15 g Oral Q15 Min PRN Andrade Jensen APRN        doxycycline (VIBRAMYCIN) 100 mg in sodium chloride 0.9 % 100 mL IVPB-VTB  100 mg Intravenous Q12H Dakota Bruce MD   100 mg at 10/21/24 0717    ferrous sulfate tablet 325 mg  325 mg Oral Daily With Breakfast Dakota Bruce MD   325 mg at 10/21/24 0718    folic acid (FOLVITE) tablet 1 mg  1 mg Oral Daily Dakota Bruce MD        glucagon HCl (Diagnostic) injection 1 mg  1 mg Intramuscular Q15 Min PRN Andrade Jensen APRN        Insulin Lispro (humaLOG) injection 2-7 Units  2-7 Units Subcutaneous 4x Daily AC & at Bedtime Andrade Jensen APRN   2 Units at 10/20/24 2228    melatonin tablet 5 mg  5 mg Oral Nightly PRN Dakota Bruce MD        pantoprazole (PROTONIX) EC tablet 40 mg  40 mg Oral Q AM Dakota Bruce MD   40 mg at 10/21/24 0602    polyvinyl alcohol (LIQUIFILM) 1.4 % ophthalmic solution 1 drop  1 drop Both Eyes TID PRN Dakota Bruce MD        Psyllium (METAMUCIL MULTIHEALTH FIBER) 51.7 % packet 1 packet  1 packet Oral Daily PRN Dakota Bruce MD        sertraline (ZOLOFT) tablet 100 mg  100 mg Oral Daily Dakota Bruce MD        sodium chloride 0.9 % flush 10 mL  10 mL Intravenous Q12H Dakota Bruce MD   10 mL at 10/20/24 2231    sodium chloride 0.9 % flush 10 mL  10 mL Intravenous PRN Dakota Bruce MD        thiamine (VITAMIN B-1) tablet 200 mg  200 mg Oral Q8H Dakota Bruce MD   200 mg at 10/21/24 0602     Orders (all)        Start     Ordered     10/21/24 2100  atorvastatin (LIPITOR) tablet 20 mg  Nightly         10/21/24 0530    10/21/24 0900  ascorbic acid (VITAMIN C) tablet 250 mg  Daily         10/21/24 0530    10/21/24 0900  folic acid (FOLVITE) tablet 1 mg  Daily         10/21/24 0530    10/21/24 0900  apixaban (ELIQUIS) tablet 5 mg  2 Times Daily         10/21/24 0530    10/21/24 0900  sertraline (ZOLOFT) tablet 100 mg  Daily         10/21/24 0530    10/21/24 0800  Oral Care  2 Times Daily       10/20/24 2158    10/21/24 0800  ferrous sulfate tablet 325 mg  Daily With Breakfast         10/21/24 0530    10/21/24 0642  POC Glucose Once  PROCEDURE ONCE        Comments: Complete no more than 45 minutes prior to patient eating      10/21/24 0624    10/21/24 0630  pantoprazole (PROTONIX) EC tablet 40 mg  Every Early Morning         10/21/24 0530    10/21/24 0630  thiamine (VITAMIN B-1) tablet 200 mg  Every 8 Hours         10/21/24 0531    10/21/24 0615  cefTRIAXone (ROCEPHIN) 1 g in sodium chloride 0.9 % 100 mL IVPB-VTB  Every 24 Hours         10/21/24 0521    10/21/24 0615  doxycycline (VIBRAMYCIN) 100 mg in sodium chloride 0.9 % 100 mL IVPB-VTB  Every 12 Hours         10/21/24 0521    10/21/24 0600  Comprehensive Metabolic Panel  Morning Draw         10/21/24 0024    10/21/24 0600  CBC & Differential  Morning Draw         10/21/24 0024    10/21/24 0600  CBC Auto Differential  PROCEDURE ONCE         10/21/24 0024    10/21/24 0549  Diet: Regular/House, Fluid Restriction (240 mL/tray); 1000 mL/day; Texture: Soft to Chew (NDD 3); Soft to Chew: Whole Meat; Fluid Consistency: Thin (IDDSI 0)  Diet Effective Now         10/21/24 0549    10/21/24 0529  Hemoglobin A1c  Once         10/21/24 0528    10/21/24 0529  TSH  Once         10/21/24 0528    10/21/24 0529  Magnesium  Once         10/21/24 0528    10/21/24 0525  Psyllium (METAMUCIL MULTIHEALTH FIBER) 51.7 % packet 1 packet  Daily PRN         10/21/24 0530    10/21/24 0525  melatonin tablet 5 mg  Nightly PRN          10/21/24 0530    10/21/24 0525  polyvinyl alcohol (LIQUIFILM) 1.4 % ophthalmic solution 1 drop  3 Times Daily PRN         10/21/24 0530    10/21/24 0521  Sepsis Fluid Exclusion: Blood Pressure Responded To Lesser Volume; Bolus Volume Given / Ordered (mL): 0  Once         10/21/24 0521    10/21/24 0520  Respiratory Culture - Sputum, Cough  Once         10/21/24 0519    10/21/24 0335  POC Glucose Once  PROCEDURE ONCE        Comments: Complete no more than 45 minutes prior to patient eating      10/21/24 0327    10/21/24 0116  POC Glucose Once  PROCEDURE ONCE        Comments: Complete no more than 45 minutes prior to patient eating      10/21/24 0109    10/21/24 0000  Vital Signs  Every 6 Hours         10/20/24 2158    10/21/24 0000  Intake & Output  Every 6 Hours         10/20/24 2158    10/21/24 0000  Neuro Checks  Every 6 Hours         10/20/24 2158    10/20/24 2245  Insulin Lispro (humaLOG) injection 2-7 Units  4 Times Daily Before Meals & Nightly         10/20/24 2158    10/20/24 2245  sodium chloride 0.9 % flush 10 mL  Every 12 Hours Scheduled         10/20/24 2158    10/20/24 2216  POC Glucose Once  PROCEDURE ONCE        Comments: Complete no more than 45 minutes prior to patient eating      10/20/24 2207    10/20/24 2200  POC Glucose 4x Daily Before Meals & at Bedtime  4 Times Daily Before Meals & at Bedtime      Comments: Complete no more than 45 minutes prior to patient eating      10/20/24 2158    10/20/24 2159  Weigh Patient  Once         10/20/24 2158    10/20/24 2159  Notify PCP of Patient Admission  Once         10/20/24 2158    10/20/24 2159  Insert Peripheral IV  Once         10/20/24 2158    10/20/24 2159  Saline Lock & Maintain IV Access  Continuous         10/20/24 2158    10/20/24 2159  Place Sequential Compression Device  Once         10/20/24 2158    10/20/24 2159  Maintain Sequential Compression Device  Continuous         10/20/24 2158    10/20/24 2159  Activity - Strict Bed Rest  Until  "Discontinued         10/20/24 2158    10/20/24 2159  Daily Weights  Daily       10/20/24 2158    10/20/24 2159  POC Glucose Once  Once        Comments: Complete no more than 45 minutes prior to patient eating      10/20/24 2158    10/20/24 2159  Diet: Regular/House; Texture: Soft to Chew (NDD 3); Soft to Chew: Whole Meat; Fluid Consistency: Thin (IDDSI 0)  Diet Effective Now,   Status:  Canceled         10/20/24 2158    10/20/24 2159  SLP Consult: Eval & Treat Swallow Disorder  Once         10/20/24 2158    10/20/24 2159  PT Consult: Eval & Treat Functional Mobility Below Baseline, Discharge Placement Assessment  Once         10/20/24 2158    10/20/24 2159  OT Consult: Eval & Treat ADL Performance Below Baseline, Discharge Placement Assessment  Once         10/20/24 2158    10/20/24 2159  Inpatient Case Management  Consult  Once        Provider:  (Not yet assigned)    10/20/24 2158    10/20/24 2158  sodium chloride 0.9 % flush 10 mL  As Needed         10/20/24 2158    10/20/24 2158  sennosides-docusate (PERICOLACE) 8.6-50 MG per tablet 2 tablet  2 Times Daily PRN        Placed in \"And\" Linked Group    10/20/24 2158    10/20/24 2158  polyethylene glycol (MIRALAX) packet 17 g  Daily PRN        Placed in \"And\" Linked Group    10/20/24 2158    10/20/24 2158  bisacodyl (DULCOLAX) EC tablet 5 mg  Daily PRN        Placed in \"And\" Linked Group    10/20/24 2158    10/20/24 2158  bisacodyl (DULCOLAX) suppository 10 mg  Daily PRN        Placed in \"And\" Linked Group    10/20/24 2158    10/20/24 2158  dextrose (GLUTOSE) oral gel 15 g  Every 15 Minutes PRN         10/20/24 2158    10/20/24 2158  dextrose (D50W) (25 g/50 mL) IV injection 25 g  Every 15 Minutes PRN         10/20/24 2158    10/20/24 2158  glucagon HCl (Diagnostic) injection 1 mg  Every 15 Minutes PRN         10/20/24 2158    10/20/24 2143  Code Status and Medical Interventions: CPR (Attempt to Resuscitate); Full Support  Continuous         10/20/24 " 2144    10/20/24 2111  POC Glucose Once  PROCEDURE ONCE        Comments: Complete no more than 45 minutes prior to patient eating      10/20/24 2103    10/20/24 2056  POC Glucose Once  Once        Comments: Complete no more than 45 minutes prior to patient eating      10/20/24 2055    10/20/24 2038  Initiate Observation Status  Once         10/20/24 2038    10/20/24 2022  dextrose (D50W) (25 g/50 mL) IV injection 25 g  Once         10/20/24 2006    10/20/24 2013  POC Glucose Once  PROCEDURE ONCE        Comments: Complete no more than 45 minutes prior to patient eating      10/20/24 2005    10/20/24 2011  Diet: Regular/House; Fluid Consistency: Thin (IDDSI 0)  Diet Effective Now,   Status:  Canceled         10/20/24 2010    10/20/24 2000  POC Glucose Once  Once        Comments: Complete no more than 45 minutes prior to patient eating      10/20/24 1959    10/20/24 1428  cefTRIAXone (ROCEPHIN) 1,000 mg in sodium chloride 0.9 % 100 mL IVPB-VTB  Once         10/20/24 1412    10/20/24 1314  CT Chest Without Contrast Diagnostic  1 Time Imaging         10/20/24 1313    10/20/24 1310  High Sensitivity Troponin T 2Hr  PROCEDURE ONCE         10/20/24 1201    10/20/24 1138  CT Abdomen Pelvis Without Contrast  1 Time Imaging         10/20/24 1137    10/20/24 1114  Blood Gas, Arterial With Co-Ox  PROCEDURE ONCE         10/20/24 1112    10/20/24 1105  High Sensitivity Troponin T  STAT         10/20/24 1104    10/20/24 1105  BNP  STAT         10/20/24 1104    10/20/24 1105  ECG 12 Lead Dyspnea  STAT         10/20/24 1104    10/20/24 1104  [place] Draw  Once         10/20/24 1104    10/20/24 1104  Green Top (Gel)  PROCEDURE ONCE         10/20/24 1104    10/20/24 1104  Lavender Top  PROCEDURE ONCE         10/20/24 1104    10/20/24 1104  Gold Top - SST  PROCEDURE ONCE         10/20/24 1104    10/20/24 1104  Light Blue Top  PROCEDURE ONCE         10/20/24 1104    10/20/24 1100  COVID-19, FLU A/B, RSV PCR 1 HR TAT - Swab, Nasopharynx   "Once         10/20/24 1059    10/20/24 1058  CBC & Differential  Once         10/20/24 1059    10/20/24 1058  Comprehensive Metabolic Panel  Once         10/20/24 1059    10/20/24 1058  Protime-INR  Once         10/20/24 1059    10/20/24 1058  aPTT  Once         10/20/24 1059    10/20/24 1058  Blood Culture - Blood, Arm, Right  Once        Placed in \"And\" Linked Group    10/20/24 1059    10/20/24 1058  Blood Culture - Blood, Arm, Right  Once        Comments: From a different site than #1.     Placed in \"And\" Linked Group    10/20/24 1059    10/20/24 1058  Lactic Acid, Plasma  STAT         10/20/24 1059    10/20/24 1058  Blood Gas, Arterial With Co-Ox  STAT         10/20/24 1059    10/20/24 1058  C-reactive Protein  STAT         10/20/24 1059    10/20/24 1058  Procalcitonin  Once         10/20/24 1059    10/20/24 1058  XR Chest AP  1 Time Imaging         10/20/24 1059    10/20/24 1058  CBC Auto Differential  PROCEDURE ONCE         10/20/24 1059    Unscheduled  Follow Hypoglycemia Standing Orders For Blood Glucose <70 & Notify Provider of Treatment  As Needed      Comments: Follow Hypoglycemia Orders As Outlined in Process Instructions (Open Order Report to View Full Instructions)  Notify Provider Any Time Hypoglycemia Treatment is Administered    10/20/24 2158    --  polyvinyl alcohol (LIQUIFILM) 1.4 % ophthalmic solution  3 Times Daily PRN         10/20/24 2214    --  empagliflozin (JARDIANCE) 25 MG tablet tablet  Daily         10/20/24 2214    --  metFORMIN (GLUCOPHAGE) 1000 MG tablet  2 Times Daily With Meals         10/20/24 2214    --  sildenafil (VIAGRA) 100 MG tablet  Daily PRN         10/20/24 2214    --  lisinopril (PRINIVIL,ZESTRIL) 10 MG tablet  Daily         10/20/24 2214    --  glipizide (GLUCOTROL) 10 MG tablet  2 Times Daily Before Meals         10/20/24 2214    --  omeprazole (priLOSEC) 20 MG capsule  Daily         10/20/24 2214    --  atorvastatin (LIPITOR) 40 MG tablet  Nightly         10/20/24 8389 "    --  Melatonin 3 MG capsule  Nightly PRN         10/20/24 2214    --  metoprolol succinate XL (TOPROL-XL) 100 MG 24 hr tablet  Daily         10/20/24 2214    --  ascorbic acid (VITAMIN C) 250 MG tablet  Daily         10/20/24 2214    --  ferrous gluconate (FERGON) 324 MG tablet  Daily With Breakfast         10/20/24 2214    --  folic acid (FOLVITE) 1 MG tablet  Daily         10/20/24 2214    --  furosemide (LASIX) 40 MG tablet  Daily         10/20/24 2214    --  polyethylene glycol (MiraLax Mix-In [place]) 17 g packet  Daily PRN         10/20/24 2214    --  senna 8.6 MG tablet  Daily PRN         10/20/24 2214    --  psyllium (METAMUCIL) 58.6 % packet  Daily PRN         10/20/24 2214    --  apixaban (ELIQUIS) 5 MG tablet tablet  2 Times Daily         10/20/24 2214    --  sertraline (ZOLOFT) 100 MG tablet  Daily         10/20/24 2214    --  levoFLOXacin (LEVAQUIN) 500 MG tablet  Daily         10/20/24 2214                  Physician Progress Notes (all)    No notes of this type exist for this encounter.       Consult Notes (all)    No notes of this type exist for this encounter.         infant, 2,500 or more grams

## 2024-10-21 NOTE — PROGRESS NOTES
Adult Nutrition  Assessment/PES    Patient Name:  Tyler Andrade  YOB: 1940  MRN: [id number]  Admit Date:  10/20/2024    Assessment Date:  10/21/2024    Comments:  MST    Encourage po and voice food prefs.    Will send magic cup 1-2 per day to aid with po intake.    Will cont to follow and monitor.     Reason for Assessment       Row Name 10/21/24 1655          Reason for Assessment    Reason For Assessment identified at risk by screening criteria  [place], [place]     Diagnosis other (see comments)  Debility, anemia hx lung ca, XRT, DM     Identified At Risk by Screening Criteria MST SCORE 2+                    Nutrition/Diet History       Row Name 10/21/24 1656          Nutrition/Diet History    Typical Intake (Food/Fluid/EN/PN) called to room no answer                    Labs/Tests/Procedures/Meds       Row Name 10/21/24 1657          Labs/Procedures/Meds    Lab Results Reviewed reviewed     Lab Results Comments glu 186, 146        Diagnostic Tests/Procedures    Diagnostic Test/Procedure Reviewed reviewed        Medications    Pertinent Medications Reviewed reviewed     Pertinent Medications Comments thiamine, humalog, vitamin C, iron, folic acid                      Estimated/Assessed Needs - Anthropometrics       Row Name 10/21/24 1703          Anthropometrics    Calculation Weight 86.5 kg (190 lb 11.2 oz)        Estimated/Assessed Needs    Additional Documentation Estimated Calorie Needs (Group);Fluid Requirements (Group);Protein Requirements (Group)        Estimated Calorie Needs    Estimated Calorie Require (kcal/day) 3461-3852 kcal ( mifflin 1.2-1.4)     Estimated Calorie Need Method Benzie-St Jeor        Protein Requirements    Est Protein Requirement Amount (gms/kg) 1.0 gm protein  87 gm pro        Fluid Requirements    Estimated Fluid Requirement Method RDA Method  0893-9613 ml                    Nutrition Prescription Ordered       Row Name 10/21/24 1706          Nutrition  Prescription PO    Current PO Diet Soft Texture     Texture Whole foods     Fluid Consistency Nectar/syrup thick     Common Modifiers Fluid Restriction     Fluid Restriction mL per Day 1000 mL                    Evaluation of Received Nutrient/Fluid Intake       Row Name 10/21/24 1705          Fluid Intake Evaluation    Oral Fluid (mL) 490        PO Evaluation    Number of Meals 1     % PO Intake 75                       Problem/Interventions:   Problem 1       Row Name 10/21/24 1706          Nutrition Diagnoses Problem 1    Problem 1 Other (comment)  Inadequate energy intake related to cancer as evidenced by debility as evidenced by report po on admit                          Intervention Goal       Row Name 10/21/24 1707          Intervention Goal    General Meet nutritional needs for age/condition     PO Establish PO;PO intake (%)     PO Intake % 50 %                    Nutrition Intervention       Row Name 10/21/24 1707          Nutrition Intervention    RD/Tech Action Follow Tx progress                      Education/Evaluation       Row Name 10/21/24 1707          Education    Education Education not appropriate at this time        Monitor/Evaluation    Monitor Per protocol;I&O;PO intake;Pertinent labs;Weight                     Electronically signed by:  Sheila Deleon RD  10/21/24 17:14 EDT

## 2024-10-21 NOTE — THERAPY EVALUATION
Acute Care - Occupational Therapy Initial Evaluation   [place]     Patient Name: Tyler Andrade  : 1940  MRN: [id number]  Today's Date: 10/21/2024  Onset of Illness/Injury or Date of Surgery: 10/20/24     Referring Physician: Beth    Admit Date: 10/20/2024       ICD-10-CM ICD-9-CM   1. Pneumonia of left lung due to infectious organism, unspecified part of lung  J18.9 486     Patient Active Problem List   Diagnosis    Basal cell carcinoma    Idiopathic polypoidal choroidal vasculopathy    Lung mass    NSCLC of left lung    Macular degeneration    Old myocardial infarction    Personal history of other endocrine, nutritional and metabolic disease    Diabetic retinopathy    Retinal hemorrhage    Squamous cell lung cancer, left    Inability to walk     History reviewed. No pertinent past medical history.  History reviewed. No pertinent surgical history.      OT ASSESSMENT FLOWSHEET (Last 12 Hours)       OT Evaluation and Treatment       Row Name 10/21/24 1318                   OT Time and Intention    Document Type evaluation  -KR        Mode of Treatment occupational therapy  -KR        Patient Effort adequate  -KR           General Information    General Observations of Patient alert/cooperative  -KR        Prior Level of Function independent:  -KR           Living Environment    Current Living Arrangements home  -KR        People in Home alone  -KR        Primary Care Provided by self  -KR           Cognition    Affect/Mental Status (Cognition) WFL  -KR        Orientation Status (Cognition) oriented x 3  -KR        Follows Commands (Cognition) WFL  -KR           Range of Motion Comprehensive    Comment, General Range of Motion BUE WFL  -KR           Strength Comprehensive (MMT)    Comment, General Manual Muscle Testing (MMT) Assessment BUE 3/5  -KR           Activities of Daily Living    BADL Assessment/Intervention bathing;upper body dressing;lower body dressing;grooming;feeding;toileting  -KR            Bathing Assessment/Intervention    [place] Level (Bathing) bathing skills;moderate assist (50% patient effort)  -KR           Upper Body Dressing Assessment/Training    [place] Level (Upper Body Dressing) upper body dressing skills;minimum assist (75% patient effort)  -KR           Lower Body Dressing Assessment/Training    [place] Level (Lower Body Dressing) lower body dressing skills;moderate assist (50% patient effort)  -KR           Grooming Assessment/Training    [place] Level (Grooming) grooming skills;minimum assist (75% patient effort)  -KR           Self-Feeding Assessment/Training    [place] Level (Feeding) feeding skills;set up  -KR           Toileting Assessment/Training    [place] Level (Toileting) toileting skills;minimum assist (75% patient effort)  -KR           Plan of Care Review    Plan of Care Reviewed With patient  -KR           Therapy Assessment/Plan (OT)    Planned Therapy Interventions (OT) activity tolerance training;adaptive equipment training;BADL retraining  -KR           Therapy Plan Review/Discharge Plan (OT)    Anticipated Discharge Disposition (OT) home  -KR           OT Goals    Dressing Goal Selection (OT) dressing, OT goal 1  -KR        Activity Tolerance Goal Selection (OT) activity tolerance, OT goal 1  -KR           Dressing Goal 1 (OT)    Activity/Device (Dressing Goal 1, OT) dressing skills, all  -KR        [place]/Cues Needed (Dressing Goal 1, OT) contact guard required  -KR        Time Frame (Dressing Goal 1, OT) by discharge  -KR            Activity Tolerance Goal 1 (OT)    Activity Tolerance Goal 1 (OT) Increase to enhance self care performance  -KR        Activity Level (Endurance Goal 1, OT) 15 min activity  -KR        Time Frame (Activity Tolerance Goal 1, OT) by discharge  -KR           Patient Education Goal (OT)    Activity (Patient Education Goal, OT) AE/DME training as needed to enhance safety/independence in home environment   -KR        [place]/Cues/Accuracy (Memory Goal 2, OT) verbalizes understanding  -KR        Time Frame (Patient Education Goal, OT) by discharge  -KR                  User Key  (r) = Recorded By, (t) = Taken By, (c) = Cosigned By      Initials Name Effective Dates    Austen Bain OT 06/16/21 -                      Occupational Therapy Education        No education to display                      OT Recommendation and Plan  Planned Therapy Interventions (OT): activity tolerance training, adaptive equipment training, BADL retraining  Plan of Care Review  Plan of Care Reviewed With: patient  Plan of Care Reviewed With: patient        Time Calculation:     Therapy Charges for Today       Code Description Service Date Service Provider Modifiers Qty    10490704034 HC OT EVAL MOD COMPLEXITY 4 10/21/2024 Austen Pink OT GO 1                 Austen Pink OT  10/21/2024

## 2024-10-21 NOTE — THERAPY EVALUATION
Acute Care - Physical Therapy Initial Evaluation   [place]     Patient Name: Tyler Andrade  : 1940  MRN: [id number]  Today's Date: 10/21/2024   Onset of Illness/Injury or Date of Surgery: 10/20/24  Visit Dx:     ICD-10-CM ICD-9-CM   1. Pneumonia of left lung due to infectious organism, unspecified part of lung  J18.9 486     Patient Active Problem List   Diagnosis    Basal cell carcinoma    Idiopathic polypoidal choroidal vasculopathy    Lung mass    NSCLC of left lung    Macular degeneration    Old myocardial infarction    Personal history of other endocrine, nutritional and metabolic disease    Diabetic retinopathy    Retinal hemorrhage    Squamous cell lung cancer, left    Inability to walk     History reviewed. No pertinent past medical history.  History reviewed. No pertinent surgical history.  PT Assessment (Last 12 Hours)       PT Evaluation and Treatment       Row Name 10/21/24 1033          Physical Therapy Time and Intention    Subjective Information no complaints  -CS     Document Type evaluation  -CS     Mode of Treatment physical therapy  -CS     Patient Effort adequate  -CS     Comment Pt seen bedside for evaluation this AM. Pt reports being ambulatory w/ a RW at home. Pt has 5 steps to enter home. Pt agreed to evaluation.  -CS       Row Name 10/21/24 1033          General Information    Patient Profile Reviewed yes  -CS     Onset of Illness/Injury or Date of Surgery 10/20/24  -CS     Referring Physician [place]  -CS     Patient Observations alert;cooperative;agree to therapy  -CS     General Observations of Patient Pt supine, NAD  -CS     Prior Level of Function --  Pt reports being ambulatory w/ a RW at home.  -CS     Risks Reviewed patient:;LOB;nausea/vomiting;dizziness;increased discomfort;change in vital signs;increased drainage;lines disloged  -CS     Benefits Reviewed patient:;improve function;increase independence;increase strength;increase balance;decrease pain;decrease risk of  DVT;improve skin integrity;increase knowledge  -       Row Name 10/21/24 1033          Pain    Pre/Posttreatment Pain Comment no c/o  -CS       Row Name 10/21/24 1033          Cognition    Orientation Status (Cognition) oriented x 4  -       Row Name 10/21/24 1033          Range of Motion (ROM)    Range of Motion bilateral lower extremities;ROM is WFL  -       Row Name 10/21/24 1033          Strength Comprehensive (MMT)    Comment, General Manual Muscle Testing (MMT) Assessment (L)LE grossly 4+/5, (R)LE grossly 4-/5  -       Row Name 10/21/24 1033          Bed Mobility    Bed Mobility bed mobility (all) activities  -     All Activities, [place] (Bed Mobility) minimum assist (75% patient effort)  -       Row Name 10/21/24 1033          Transfers    Transfers sit-stand transfer;stand-sit transfer  -     Comment, (Transfers) min(A) to RW  -       Row Name 10/21/24 1033          Sit-Stand Transfer    Sit-Stand [place] (Transfers) minimum assist (75% patient effort)  -CS     Assistive Device (Sit-Stand Transfers) walker, front-wheeled  -       Row Name 10/21/24 1033          Stand-Sit Transfer    Stand-Sit [place] (Transfers) minimum assist (75% patient effort)  -CS     Assistive Device (Stand-Sit Transfers) walker, front-wheeled  -       Row Name 10/21/24 1033          Gait/Stairs (Locomotion)    Patient was able to Ambulate no, other medical factors prevent ambulation  -CS     Reason Patient was unable to Ambulate Excessive Weakness  -CS     Comment, (Gait/Stairs) Pt declined 2/2 weakness  -       Row Name 10/21/24 1033          Plan of Care Review    Plan of Care Reviewed With patient  -CS     Progress no change  -CS     Outcome Evaluation Pt presents w/ decreased bed mobility, transfers, and gait. Pt would benefit from skilled PT. D/C disposition depends on progress while in house.  -       Row Name 10/21/24 1033          Positioning and Restraints    Pre-Treatment Position in  bed  -CS     Post Treatment Position bed  -CS     In Bed supine;call light within reach;encouraged to call for assist;exit alarm on  -CS       Row Name 10/21/24 1033          Therapy Assessment/Plan (PT)    Functional Level at Time of Evaluation (PT) min(A)  -CS     PT Diagnosis (PT) impaired functional mobility  -CS     Rehab Potential (PT) good  -CS     Criteria for Skilled Interventions Met (PT) yes;meets criteria;skilled treatment is necessary  -CS     Therapy Frequency (PT) 2 times/wk  2-5x/week  -CS     Predicted Duration of Therapy Intervention (PT) while in house  -CS     Problem List (PT) problems related to;balance;mobility;strength  -CS     Activity Limitations Related to Problem List (PT) unable to ambulate safely;unable to transfer safely  -CS     Therapy Assessment/Plan (PT) Pt presents w/ decreased bed mobility, transfers, and gait. Pt would benefit from skilled PT. D/C disposition depends on progress while in house.  -CS       Row Name 10/21/24 1033          PT Evaluation Complexity    History, PT Evaluation Complexity 3 or more personal factors and/or comorbidities  -CS     Examination of Body Systems (PT Eval Complexity) total of 4 or more elements  -CS     Clinical Presentation (PT Evaluation Complexity) evolving  -CS     Clinical Decision Making (PT Evaluation Complexity) moderate complexity  -CS     Overall Complexity (PT Evaluation Complexity) moderate complexity  -CS       Row Name 10/21/24 1033          Therapy Plan Review/Discharge Plan (PT)    Therapy Plan Review (PT) evaluation/treatment results reviewed;care plan/treatment goals reviewed;risks/benefits reviewed;current/potential barriers reviewed;participants voiced agreement with care plan;participants included;patient  -CS       Row Name 10/21/24 1039          Physical Therapy Goals    Bed Mobility Goal Selection (PT) bed mobility, PT goal 1  -CS     Transfer Goal Selection (PT) transfer, PT goal 1  -CS     Gait Training Goal Selection  (PT) gait training, PT goal 1  -CS       Row Name 10/21/24 1033          Bed Mobility Goal 1 (PT)    Activity/Assistive Device (Bed Mobility Goal 1, PT) bed mobility activities, all  -CS     [place] Level/Cues Needed (Bed Mobility Goal 1, PT) standby assist  -CS     Time Frame (Bed Mobility Goal 1, PT) long term goal (LTG);by discharge  -CS       Row Name 10/21/24 1033          Transfer Goal 1 (PT)    Activity/Assistive Device (Transfer Goal 1, PT) transfers, all  -CS     [place] Level/Cues Needed (Transfer Goal 1, PT) standby assist  -CS     Time Frame (Transfer Goal 1, PT) long term goal (LTG);by discharge  -CS       Row Name 10/21/24 1033          Gait Training Goal 1 (PT)    Activity/Assistive Device (Gait Training Goal 1, PT) gait (walking locomotion);assistive device use  -CS     [place] Level (Gait Training Goal 1, PT) standby assist  -CS     Distance (Gait Training Goal 1, PT) 50  -CS     Time Frame (Gait Training Goal 1, PT) long term goal (LTG);by discharge  -CS               User Key  (r) = Recorded By, (t) = Taken By, (c) = Cosigned By      Initials Name Provider Type    CS Rocky Thorpe PT Physical Therapist                    Physical Therapy Education       Title: PT OT SLP Therapies (Done)       Topic: Physical Therapy (Done)       Point: Mobility training (Done)       Learning Progress Summary            Patient Acceptance, E,TB, VU by CS at 10/21/2024 1041                      Point: Home exercise program (Done)       Learning Progress Summary            Patient Acceptance, E,TB, VU by CS at 10/21/2024 1041                      Point: Body mechanics (Done)       Learning Progress Summary            Patient Acceptance, E,TB, VU by CS at 10/21/2024 1041                      Point: Precautions (Done)       Learning Progress Summary            Patient Acceptance, E,TB, VU by CS at 10/21/2024 1041                                      User Key       Initials Effective Dates Name Provider  Type Discipline     05/31/23 -  Rocky Thorpe, PT Physical Therapist PT                  PT Recommendation and Plan  Anticipated Discharge Disposition (PT):  (TBD)  Planned Therapy Interventions (PT): balance training, bed mobility training, gait training, home exercise program, neuromuscular re-education, patient/family education, strengthening, transfer training  Therapy Frequency (PT): 2 times/wk (2-5x/week)  Plan of Care Reviewed With: patient  Progress: no change  Outcome Evaluation: Pt presents w/ decreased bed mobility, transfers, and gait. Pt would benefit from skilled PT. D/C disposition depends on progress while in house.       Time Calculation:    PT Charges       Row Name 10/21/24 1042             Time Calculation    Start Time 0945  -      PT Received On 10/21/24  -      PT Goal Re-Cert Due Date 11/04/24  -                User Key  (r) = Recorded By, (t) = Taken By, (c) = Cosigned By      Initials Name Provider Type    CS Rocky Thorpe, PT Physical Therapist                  Therapy Charges for Today       Code Description Service Date Service Provider Modifiers Qty    [id number]  PT EVAL MOD COMPLEXITY 4 10/21/2024 Rocky Thorpe, PT GP 1            PT G-Codes  AM-PAC 6 Clicks Score (PT): 17    Rocky Thorpe PT  10/21/2024

## 2024-10-21 NOTE — PROGRESS NOTES
[facility] HOSPITALIST PROGRESS NOTE     Patient Identification:  Name:  Tyler Andrade  Age:  84 y.o.  Sex:  male  :  1940  MRN:  [id number]  Visit Number:  [id number]  Primary Care Provider:  Provider, No Known    Length of stay:  0    Subjective: Patient seen and examined assisted by his nurse present inside the room.  Patient sitting at the edge of the bed, reports he has been getting very weak, noted patient whispers when he talks.  Patient stated he normally is able to talk but has lost his voice since  of this year.  He denies workup or explanation for his loss of voice.  Denies coughing when he swallows.  Patient receives radiation treatment for non-small cell lung cancer left side.    Chief Complaint: Generalized weakness  ----------------------------------------------------------------------------------------------------------------------  Current Hospital Meds:  apixaban, 5 mg, Oral, BID  ascorbic acid, 250 mg, Oral, Daily  atorvastatin, 20 mg, Oral, Nightly  cefTRIAXone, 1 g, Intravenous, Q24H  doxycycline, 100 mg, Intravenous, Q12H  ferrous sulfate, 325 mg, Oral, Daily With Breakfast  folic acid, 1 mg, Oral, Daily  insulin lispro, 2-7 Units, Subcutaneous, 4x Daily AC & at Bedtime  pantoprazole, 40 mg, Oral, Q AM  sertraline, 100 mg, Oral, Daily  sodium chloride, 10 mL, Intravenous, Q12H  vitamin B-1, 200 mg, Oral, Q8H         ----------------------------------------------------------------------------------------------------------------------  Vital Signs:  Temp:  [97.5 °F (36.4 °C)-98.7 °F (37.1 °C)] 98.6 °F (37 °C)  Heart Rate:  [76-96] 86  Resp:  [17-20] 20  BP: ()/(51-95) 119/66       Tele:       10/20/24  1058 10/20/24  2240 10/21/24  0500   Weight: 90.7 kg (200 lb) 86.5 kg (190 lb 11.2 oz) (Anthony Lopes) 86.5 kg (190 lb 11.2 oz)     Body mass index is 28.16 kg/m².    Intake/Output Summary (Last 24 hours) at 10/21/2024 0820  Last data filed at 10/21/2024  0355  Gross per 24 hour   Intake 350 ml   Output 500 ml   Net -150 ml     Diet: Regular/House, Fluid Restriction (240 mL/tray); 1000 mL/day; Texture: Soft to Chew (NDD 3); Soft to Chew: Whole Meat; Fluid Consistency: Thin (IDDSI 0)  ----------------------------------------------------------------------------------------------------------------------  Physical exam:  General: Sitting at the edge of the bed very pleasant awake and alert answers appropriately.    No respiratory distress.    Skin:  Skin is warm and dry. No rash noted. No pallor.    HENT: Multiple basal lesions including the nose and face.  Head:  Normocephalic and atraumatic.  Mouth:  Moist mucous membranes.    Eyes:  Conjunctivae and EOM are normal.  Pupils are equal, round, and reactive to light.  No scleral icterus.    Neck:  Neck supple.  No JVD present.  No lymphadenopathy  Pulmonary/Chest:  No respiratory distress, no wheezes, no crackles, with normal breath sounds and good air movement.  Cardiovascular:  Normal rate, regular rhythm and normal heart sounds with no murmur.  Abdominal:  Soft.  Bowel sounds are normal.  No distension and no tenderness.   Extremities:  No edema, no tenderness, and no deformity.  No red or swollen joints anywhere.  Strong pulses in all 4 extremities with no clubbing, no cyanosis, no edema.  Neurological:  Motor strength equal no obvious deficit, sensory grossly intact.   No cranial nerve deficit.  No tongue deviation.  No facial droop.  No slurred speech.    Genitourinary: No Santana catheter  Back:  ----------------------------------------------------------------------------------------------------------------------  ----------------------------------------------------------------------------------------------------------------------  Results from last 7 days   Lab Units 10/20/24  1309 10/20/24  1110   HSTROP T ng/L 29* 27*     Results from last 7 days   Lab Units 10/21/24  0104 10/20/24  1110   CRP mg/dL  --  12.93*  "  LACTATE mmol/L  --  2.0   WBC 10*3/mm3 11.81* 14.19*   HEMOGLOBIN g/dL 8.0* 9.0*   HEMATOCRIT % 26.9* 29.9*   MCV fL 87.1 86.9   MCHC g/dL 29.7* 30.1*   PLATELETS 10*3/mm3 386 433   INR   --  1.71*     Results from last 7 days   Lab Units 10/20/24  1112   PH, ARTERIAL pH units 7.430   PO2 ART mm Hg 68.0*   PCO2, ARTERIAL mm Hg 38.0   HCO3 ART mmol/L 25.2     Results from last 7 days   Lab Units 10/21/24  0104 10/20/24  1110   SODIUM mmol/L 128* 124*   POTASSIUM mmol/L 4.6 4.6   MAGNESIUM mg/dL 1.8  --    CHLORIDE mmol/L 93* 88*   CO2 mmol/L 26.6 25.5   BUN mg/dL 18 16   CREATININE mg/dL 1.07 0.93   CALCIUM mg/dL 10.0 10.3   GLUCOSE mg/dL 68 163*   ALBUMIN g/dL 2.7* 3.1*   BILIRUBIN mg/dL 0.2 0.3   ALK PHOS U/L 82 93   AST (SGOT) U/L 22 24   ALT (SGPT) U/L 24 28   Estimated Creatinine Clearance: 57 mL/min (by C-G formula based on SCr of 1.07 mg/dL).    No results found for: \"AMMONIA\"      No results found for: \"BLOODCX\"  No results found for: \"URINECX\"  No results found for: \"WOUNDCX\"  No results found for: \"STOOLCX\"    I have personally looked at the labs and they are summarized above.  ----------------------------------------------------------------------------------------------------------------------  Imaging Results (Last 24 Hours)       Procedure Component Value Units Date/Time    CT Chest Without Contrast Diagnostic [[id number]] Collected: 10/20/24 1528     Updated: 10/20/24 1535    Narrative:      PROCEDURE: CT of the chest performed without IV contrast on October 20, 2024. The examination was performed with 3 mm axial imaging and 3 mm  sagittal and coronal reconstruction images. The examination was  performed according to as low as reasonably achievable dose protocol.  Total . The examination was performed according to as low as  reasonably achievable dose protocol.     HISTORY: Shortness of breath.     COMPARISON: None.     FINDINGS:     Enlarged heart size  Moderate size left pleural " effusion.  Small size right pleural effusion.  Emphysematous changes in the upper lobes.  Airspace consolidation with central cavitation in the superior segment  of the left lower lobe consistent with pneumonia.  Airspace consolidation and atelectasis and scarring also noted in the  lower lingula.  Contribution from underlying mass lesion and neoplasm in the left lower  lobe is not excluded.  Focal airspace consolidation with spiculated margins in the anterior  aspect of the left upper lobe adjacent to the pleural margin and seen  best on image 34, series 3.  Markedly enlarged mediastinal lymph nodes suspicious for metastatic  disease to the mediastinum. Markedly enlarged AP window and subcarinal  and left hilar adenopathy.  Normal thyroid gland.  Sternotomy.  No pericardial effusion.  Coronary arterial vascular calcifications.  Moderate volume of dense calcified plaque along the aortic arch and  descending thoracic aortic segments.  Small cyst in the left hepatic lobe.       Impression:         1.  Heterogeneous area of consolidation in the superior segment of the  left lower lobe with central cavitation consistent with pneumonia with  probable contribution from underlying mass lesion/neoplasm.  2.  Markedly enlarged mediastinal lymph nodes including subcarinal and  AP window lymph nodes with heterogeneous attenuation suggestive of  underlying metastatic disease and neoplastic involvement.  3.  Enlarged heart size.  4.  Small right pleural effusion.  5.  Moderate size left pleural effusion.  6.  Focal area of consolidation in the anterior aspect of the left upper  lobe with spiculated margins suspicious for additional mass lesion.  7.  Emphysema.  8.  No pneumothorax.  9.  Small left hepatic cyst.  10.  Degenerative disc disease throughout the thoracic spine with  endplate and facet hypertrophic changes.        This report was finalized on 10/20/2024 3:33 PM by Chico Modi MD.       CT Abdomen Pelvis Without  Contrast [[id number]] Collected: 10/20/24 1235     Updated: 10/20/24 1242    Narrative:      PROCEDURE: CT of the abdomen and pelvis performed without IV contrast on  October 20, 2024. The examination was performed with 4 mm axial imaging  and 4 mm sagittal and coronal reconstruction images. The examination was  performed according to as low as reasonably achievable dose protocol.     HISTORY: Abdominal pain.     COMPARISON: None.     FINDINGS:     Enlarged heart size.  Small size right pleural effusion.  Moderate size left pleural effusion.  Compressive atelectasis at the lung bases, left greater than right.  Sternotomy.  No pericardial effusion.  Mild atelectasis also noted in the lower lingula.  No acute process seen in the liver, pancreas, adrenal glands, and  kidneys.  A normal appendix is well seen.  Mild constipation throughout the colon.  Mild enlarged prostate gland.  Mild osteoarthritis at the right and left hip joint.  Multilevel degenerative disc disease throughout the lumbar spine with  endplate and facet hypertrophic changes.  No free fluid or free air. No abscess or hematoma.  Small umbilical hernia contains only fat.  Small left hepatic cyst.  Mild ectatic infrarenal abdominal aorta up to 2.69 cm in diameter.  No retroperitoneal hemorrhage.  Renal vascular calcifications on the right and left side.  1 mm calcification at the lower pole of the left kidney seen best on  image 50, series 2 is nonobstructive.       Impression:         1.  Mild enlarged heart size.  2.  Bilateral pleural effusions, left greater than right with some mild  compressive atelectasis at the lung bases.  3.  Normal appendix is well seen.  4.  Slightly ectatic infrarenal abdominal aortic segment.  5.  Small left hepatic cyst.  6.  1 mm calcification in the lower pole of the left kidney.  7.  Degenerative disc disease throughout the lumbar spine with endplate  and facet hypertrophic changes.  8.  Fluid-filled distended bladder.  9.   Mild enlarged prostate gland.  10.  Mild colon and sigmoid colon diverticulosis.  11.  Mild constipation throughout the colon.  12.  No features of enterocolitis.  13.  No diverticulitis.  14.  No free fluid or free air.  15.  No abscess or hematoma.     This report was finalized on 10/20/2024 12:40 PM by Chico Modi MD.       XR Chest AP [[id number]] Collected: 10/20/24 1222     Updated: 10/20/24 1239    Narrative:      PROCEDURE: Chest x-ray examination performed on October 20, 2024. Single  view. Upright position.     HISTORY: Shortness of breath.     COMPARISON: None.     FINDINGS:     Enlarged heart size  Sternotomy.  Coarsened bronchovascular pattern to the lungs.  Airspace consolidation in the left lower lobe and lower lingula  suggestive of underlying multifocal pneumonia.  No edema.  Small left pleural effusion.   No pneumothorax.       Impression:      Impression:     1.  Enlarged heart size.  2.  Airspace disease in the left lower lobe and lower lingula.  3.  Small left pleural effusion.  4.  Sternotomy.  5.  Coarsened bronchovascular pattern to the lungs  6.  No pneumothorax.        This report was finalized on 10/20/2024 12:24 PM by Chico Modi MD.             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    Continue antibiotic, monitor sodium, H&H monitoring, speech evaluation for  hoarseness of voice, will request pulmonology input, Accu-Chek and sliding scale.  PT OT.    Disposition pending      Nurys Campos MD  10/21/24  08:20 EDT

## 2024-10-21 NOTE — MBS/VFSS/FEES
"Acute Care - Speech Language Pathology   Swallow Initial Evaluation [facility]  Modified Barium Swallow Study     Patient Name: Tyler Andrade  : 1940  MRN: [id number]  Today's Date: 10/21/2024  Onset of Illness/Injury or Date of Surgery: 10/20/24     Referring Physician: Beth      Admit Date: 10/20/2024    Visit Dx:     ICD-10-CM ICD-9-CM   1. Pneumonia of left lung due to infectious organism, unspecified part of lung  J18.9 486     Patient Active Problem List   Diagnosis    Basal cell carcinoma    Idiopathic polypoidal choroidal vasculopathy    Lung mass    NSCLC of left lung    Macular degeneration    Old myocardial infarction    Personal history of other endocrine, nutritional and metabolic disease    Diabetic retinopathy    Retinal hemorrhage    Squamous cell lung cancer, left    Inability to walk     History reviewed. No pertinent past medical history.  History reviewed. No pertinent surgical history.    Tyler Andrade  presented to the radiology suite this PM from 3N Rm [id number] to participate in an instrumental MBS to objectively evaluate safety/efficacy of swallowing fnx, determine safest/least restrictive diet.      Per chart review: \"Patient is a 84 y.o. male with past medical history significant for diabetes, MI, and lung cancer that presented to the [facility] emergency department for evaluation of shortness of breath.  Patient states that he has had some generalized weakness for the past month.  He advises that 2 days ago he became weaker and unable to ambulate on his own.  The patient denies any chest pain or abdominal pain.  The patient states that he is struggling completing daily living activities due to his weakness.  On my examination, patient is alert and oriented x 3.  No acute distress noted.  Patient does have ROM, however patient is struggling with overall strength of movement.  The patient is in stable condition on admission to the hospital. Upon arrival to the ED, vitals " "were temperature 97.5, /82, heart rate 90, respirations 19, SpO2 95%. Labs obtained on arrival: pO2 68, initial troponin 27, reflex troponin 29.  proBNP 5383, sodium 124, chloride 88, glucose 163, CRP 12.93, PT 20.2, INR 1.71, PTT 53.4, WBC 14.19, hemoglobin 9.0, and hematocrit 29.9. Patient has been admitted to the medical/surgical unit for further evaluation and treatment.\"    Social History     Socioeconomic History    Marital status: Single   Tobacco Use    Smoking status: Former     Current packs/day: 0.00     Types: Cigarettes     Quit date:      Years since quittin.8     Passive exposure: Past    Smokeless tobacco: Never   Vaping Use    Vaping status: Never Used   Substance and Sexual Activity    Alcohol use: Never    Drug use: Never    Sexual activity: Defer      Imaging:  Narrative & Impression   PROCEDURE: CT of the chest performed without IV contrast on 2024. The examination was performed with 3 mm axial imaging and 3 mm  sagittal and coronal reconstruction images. The examination was  performed according to as low as reasonably achievable dose protocol.  Total . The examination was performed according to as low as  reasonably achievable dose protocol.     HISTORY: Shortness of breath.     COMPARISON: None.     FINDINGS:     Enlarged heart size  Moderate size left pleural effusion.  Small size right pleural effusion.  Emphysematous changes in the upper lobes.  Airspace consolidation with central cavitation in the superior segment  of the left lower lobe consistent with pneumonia.  Airspace consolidation and atelectasis and scarring also noted in the  lower lingula.  Contribution from underlying mass lesion and neoplasm in the left lower  lobe is not excluded.  Focal airspace consolidation with spiculated margins in the anterior  aspect of the left upper lobe adjacent to the pleural margin and seen  best on image 34, series 3.  Markedly enlarged mediastinal lymph nodes " suspicious for metastatic  disease to the mediastinum. Markedly enlarged AP window and subcarinal  and left hilar adenopathy.  Normal thyroid gland.  Sternotomy.  No pericardial effusion.  Coronary arterial vascular calcifications.  Moderate volume of dense calcified plaque along the aortic arch and  descending thoracic aortic segments.  Small cyst in the left hepatic lobe.     IMPRESSION:     1.  Heterogeneous area of consolidation in the superior segment of the  left lower lobe with central cavitation consistent with pneumonia with  probable contribution from underlying mass lesion/neoplasm.  2.  Markedly enlarged mediastinal lymph nodes including subcarinal and  AP window lymph nodes with heterogeneous attenuation suggestive of  underlying metastatic disease and neoplastic involvement.  3.  Enlarged heart size.  4.  Small right pleural effusion.  5.  Moderate size left pleural effusion.  6.  Focal area of consolidation in the anterior aspect of the left upper  lobe with spiculated margins suspicious for additional mass lesion.  7.  Emphysema.  8.  No pneumothorax.  9.  Small left hepatic cyst.  10.  Degenerative disc disease throughout the thoracic spine with  endplate and facet hypertrophic changes.        This report was finalized on 10/20/2024 3:33 PM by Chico Modi MD.     Labs:  Sodium  128  10/21 0104  Potassium  4.6  10/21 0104  Chloride  93  10/21 0104  CO2  26.6  10/21 0104  BUN  18  10/21 0104  Creatinine  1.07  10/21 0104  Glucose  220  10/21 1410  Hemoglobin  8.0  10/21 0104  Hematocrit  26.9  10/21 0104  WBC  11.81  10/21 0104  Platelets  386  10/21 0104  PTT  53.4  10/20 1110  Protime  20.2  10/20 1110  INR  1.71  10/20 1110     Diet Orders (active) (From admission, onward)       Start     Ordered    10/21/24 1447  Diet: Regular/House, Fluid Restriction (240 mL/tray); 1000 mL/day; Texture: Soft to Chew (NDD 3); Soft to Chew: Whole Meat; Fluid Consistency: Nectar Thick  Diet Effective Now          "10/21/24 5585                Pt is s/p Clinical Dysphagia Assessment this AM revealing s/s of aspiration.    Pt is observed on RA.     Risks and benefits of the procedure were explained w/ patient verbalizing understanding/agreement to participate. Proceed per protocol.     Patient was positioned upright and centered in a chair to accept multiple po presentations of solid cracker, puree, honey thick, nectar thick, and thin liquids via spoon, cup and straw, along w/ whole placebo pill in puree. Patient was able to self provide po trials.      All views are from the lateral plane.     Facial/oral structures were symmetrical upon observation w/o lingual deviation upon protrusion. Oral mucosa were moist, pink and clean. Secretions were clear, thin, and well controlled. OROM/KELLY was wfl to imitate oral postures. Gag was not assessed. Volitional cough was slightly weak in intensity, congested in quality, nonproductive. Vocal quality was weak in intensity w/ dysphonia noted presenting as \"whispering\". Patient was a/a and cooperative to participate, oriented to person, place, and time, follows simple directives, and participates in simple conversational exchanges.     Upon po presentations, adequate bolus anticipation w/ good labial seal for bolus clearance via spoon bowl, cup rim stability and suction via straw.  Bolus formation, manipulation,  and control were wfl w/ rotary mastication pattern. A-p transit was timely w/o significant oral residue. Tongue base retraction and linguavelar seal were adequate w/o premature spillage. No laryngeal penetration or aspiration evidenced before the swallow.     Pharyngeal swallow was timely w/ weak hyolaryngeal elevation and epiglottic inversion. Pharyngeal contraction was adequate w/o significant residue. Pt with aspiration of thin liquids via all presentation styles. Pt with carol, gross aspiration of thin liquids via cup. Reflexive cough is elicited with this presentation only. "     Full esophageal sweep reveals no mucosal abnormalities. Motility is slow w/o retrograde flow noted.      Impression: Pt presented w/ mild oral phase dysphagia, moderate pharyngeal phase dysphagia. Pt with silent aspiration of thin liquids via tsp and straw. Pt with aspiration of thin liquids via cup with cough response elicited per today's evaluation. Pt will most benefit from modified po diet of mechanical soft consistencies, NECTAR thick liquids. Please consider ENT consult for persistent dysphonia/aphonia that pt reports started around July 2024.    SLP Recommendation and Plan  1. Mechanical soft/whole meats, NECTAR thick liquids.  2. Medications whole in puree/thins.   3. JOHN precautions.   4. Oral care protocol.   5. Pt may have ice/water protocol between meals.  6. Please consider ENT consult for persistent dysphonia.     SLP to follow up for dysphagia tx.     D/w patient results and recommendations w/ verbal understanding and agreement.     D/w RN results and recommendations w/ verbal understanding and agreement.     Thank you for allowing me to participate in the care of your patient-  Celestina Galladro M.A., CCC-SLP      EDUCATION  The patient has been educated in the following areas:   Dysphagia (Swallowing Impairment) Modified Diet Instruction.     Time Calculation:    Time Calculation- SLP       Row Name 10/21/24 1447             Time Calculation- SLP    SLP - Next Appointment 10/22/24  -                User Key  (r) = Recorded By, (t) = Taken By, (c) = Cosigned By      Initials Name Provider Type     Celestina Gallardo MA,CCC-SLP Speech and Language Pathologist                  Therapy Charges for Today       Code Description Service Date Service Provider Modifiers Qty    96160762539 HC ST EVAL ORAL PHARYNG SWALLOW 4 10/21/2024 Celestina Gallardo MA,CCC-SLP GN 1    [id number] HC ST MOTION FLUORO EVAL SWALLOW 8 10/21/2024 Celestina Gallardo MA,CCC-SLP GN 1          Celestina Gallardo  MA,CCC-SLP  10/21/2024

## 2024-10-21 NOTE — H&P
[facility] Medicine Services  HISTORY & PHYSICAL    Patient Identification:  Name:  Tyler Andrade  Age:  84 y.o.  Sex:  male  :  1940  MRN:  [id number]   Visit Number:  [id number]  Admit Date: 10/20/2024   Primary Care Physician:  Provider, No Known     Subjective     Chief complaint:   Chief Complaint   Patient presents with    Shortness of Breath     History of presenting illness:   Patient is a 84 y.o. male with past medical history significant for diabetes, MI, and lung cancer that presented to the [facility] emergency department for evaluation of shortness of breath.  Patient states that he has had some generalized weakness for the past month.  He advises that 2 days ago he became weaker and unable to ambulate on his own.  The patient denies any chest pain or abdominal pain.  The patient states that he is struggling completing daily living activities due to his weakness.  On my examination, patient is alert and oriented x 3.  No acute distress noted.  Patient does have ROM, however patient is struggling with overall strength of movement.  The patient is in stable condition on admission to the hospital. Upon arrival to the ED, vitals were temperature 97.5, /82, heart rate 90, respirations 19, SpO2 95%. Labs obtained on arrival: pO2 68, initial troponin 27, reflex troponin 29.  proBNP 5383, sodium 124, chloride 88, glucose 163, CRP 12.93, PT 20.2, INR 1.71, PTT 53.4, WBC 14.19, hemoglobin 9.0, and hematocrit 29.9. Patient has been admitted to the medical/surgical unit for further evaluation and treatment.     Present during exam: RN  ---------------------------------------------------------------------------------------------------------------------   Review of Systems   Constitutional:  Positive for fatigue. Negative for chills and fever.   HENT: Negative.  Negative for congestion, sore throat and trouble swallowing.    Eyes: Negative.    Respiratory: Negative.   Negative for cough, shortness of breath and wheezing.    Cardiovascular: Negative.  Negative for chest pain, palpitations and leg swelling.   Gastrointestinal: Negative.  Negative for abdominal pain, diarrhea, nausea and vomiting.   Endocrine: Negative.    Genitourinary: Negative.  Negative for dysuria.   Musculoskeletal: Negative.  Negative for neck pain and neck stiffness.   Skin: Negative.    Allergic/Immunologic: Negative.    Neurological:  Positive for weakness. Negative for dizziness, tremors, seizures, syncope, facial asymmetry, speech difficulty, light-headedness, numbness and headaches.   Hematological: Negative.    Psychiatric/Behavioral: Negative.         Otherwise 10-system ROS reviewed and is negative except as mentioned in the HPI.    ---------------------------------------------------------------------------------------------------------------------   History reviewed. No pertinent past medical history.  History reviewed. No pertinent surgical history.  History reviewed. No pertinent family history.  Social History     Socioeconomic History    Marital status: Single   Tobacco Use    Smoking status: Former     Current packs/day: 0.00     Types: Cigarettes     Quit date:      Years since quittin.8     Passive exposure: Past    Smokeless tobacco: Never   Vaping Use    Vaping status: Never Used   Substance and Sexual Activity    Alcohol use: Never    Drug use: Never    Sexual activity: Defer     ---------------------------------------------------------------------------------------------------------------------   Allergies:  Patient has no known allergies.  ---------------------------------------------------------------------------------------------------------------------   Medications below are reported home medications pulling from within the system; at this time, these medications have not been reconciled unless otherwise specified and are in the verification process for further verifcation  as current home medications.    Prior to Admission Medications       None          ---------------------------------------------------------------------------------------------------------------------    Objective     Hospital Scheduled Meds:  insulin lispro, 2-7 Units, Subcutaneous, 4x Daily AC & at Bedtime  sodium chloride, 10 mL, Intravenous, Q12H             Current listed hospital scheduled medications may not yet reflect those currently placed in orders that are signed and held, awaiting patient's arrival to floor/unit.    ---------------------------------------------------------------------------------------------------------------------   Vital Signs:  Temp:  [97.5 °F (36.4 °C)-98.7 °F (37.1 °C)] 98.7 °F (37.1 °C)  Heart Rate:  [76-96] 83  Resp:  [17-20] 18  BP: (101-140)/(59-95) 101/59  Mean Arterial Pressure (Non-Invasive) for the past 24 hrs (Last 3 readings):   Noninvasive MAP (mmHg)   10/20/24 2116 100   10/20/24 2059 86   10/20/24 2044 98     SpO2 Percentage    10/20/24 2059 10/20/24 2116 10/20/24 2240   SpO2: 98% 96% 95%     SpO2:  [92 %-98 %] 95 %  on   ;   Device (Oxygen Therapy): room air    Body mass index is 28.16 kg/m².  Wt Readings from Last 3 Encounters:   10/20/24 86.5 kg (190 lb 11.2 oz)   08/26/24 90.7 kg (200 lb)   08/31/23 99.8 kg (220 lb)       ---------------------------------------------------------------------------------------------------------------------   Physical Exam  Vitals and nursing note reviewed.   Constitutional:       General: He is awake. He is not in acute distress.     Appearance: Normal appearance. He is normal weight. He is not ill-appearing or diaphoretic.   HENT:      Head: Normocephalic and atraumatic.      Nose: Nose normal.      Mouth/Throat:      Mouth: Mucous membranes are moist.      Pharynx: Oropharynx is clear.   Eyes:      Extraocular Movements: Extraocular movements intact.      Pupils: Pupils are equal, round, and reactive to light.   Cardiovascular:       Rate and Rhythm: Normal rate and regular rhythm.      Pulses: Normal pulses.           Dorsalis pedis pulses are 2+ on the right side and 2+ on the left side.      Heart sounds: Normal heart sounds. No murmur heard.     No friction rub.   Pulmonary:      Effort: Pulmonary effort is normal. No accessory muscle usage, respiratory distress or retractions.      Breath sounds: Normal breath sounds. No wheezing, rhonchi or rales.   Abdominal:      General: Bowel sounds are normal. There is no distension.      Palpations: Abdomen is soft.      Tenderness: There is no abdominal tenderness. There is no guarding.   Musculoskeletal:         General: Normal range of motion.      Cervical back: Normal range of motion and neck supple. No rigidity.      Right lower leg: No edema.      Left lower leg: No edema.   Skin:     General: Skin is warm and dry.      Capillary Refill: Capillary refill takes 2 to 3 seconds.   Neurological:      General: No focal deficit present.      Mental Status: He is alert and oriented to person, place, and time. Mental status is at baseline.      Cranial Nerves: No dysarthria or facial asymmetry.      Sensory: Sensation is intact.      Motor: No weakness or tremor.   Psychiatric:         Attention and Perception: Attention normal.         Mood and Affect: Mood normal.         Speech: Speech normal.         Behavior: Behavior normal. Behavior is cooperative.         Thought Content: Thought content normal.         Cognition and Memory: Cognition normal.         Judgment: Judgment normal.          ---------------------------------------------------------------------------------------------------------------------  EKG: Atrial fibrillation, unconfirmed by cardiology      Telemetry:    Atrial fibrillation    I have personally reviewed the EKG/Telemetry strip  ---------------------------------------------------------------------------------------------------------------------   Results from last 7 days  "  Lab Units 10/20/24  1309 10/20/24  1110   HSTROP T ng/L 29* 27*     Results from last 7 days   Lab Units 10/20/24  1110   PROBNP pg/mL 5,383.0*       Results from last 7 days   Lab Units 10/20/24  1112   PH, ARTERIAL pH units 7.430   PO2 ART mm Hg 68.0*   PCO2, ARTERIAL mm Hg 38.0   HCO3 ART mmol/L 25.2     Results from last 7 days   Lab Units 10/20/24  1110   CRP mg/dL 12.93*   LACTATE mmol/L 2.0   WBC 10*3/mm3 14.19*   HEMOGLOBIN g/dL 9.0*   HEMATOCRIT % 29.9*   MCV fL 86.9   MCHC g/dL 30.1*   PLATELETS 10*3/mm3 433   INR  1.71*     Results from last 7 days   Lab Units 10/20/24  1110   SODIUM mmol/L 124*   POTASSIUM mmol/L 4.6   CHLORIDE mmol/L 88*   CO2 mmol/L 25.5   BUN mg/dL 16   CREATININE mg/dL 0.93   CALCIUM mg/dL 10.3   GLUCOSE mg/dL 163*   ALBUMIN g/dL 3.1*   BILIRUBIN mg/dL 0.3   ALK PHOS U/L 93   AST (SGOT) U/L 24   ALT (SGPT) U/L 28   Estimated Creatinine Clearance: 65.5 mL/min (by C-G formula based on SCr of 0.93 mg/dL).  No results found for: \"AMMONIA\"    Glucose   Date/Time Value Ref Range Status   10/20/2024 2207 195 (H) 70 - 130 mg/dL Final   10/20/2024 2103 154 (H) 70 - 130 mg/dL Final   10/20/2024 2005 58 (L) 70 - 130 mg/dL Final     No results found for: \"HGBA1C\"  No results found for: \"TSH\", \"FREET4\"    Microbiology Results (last 10 days)       Procedure Component Value - Date/Time    COVID-19, FLU A/B, RSV PCR 1 HR TAT - Swab, Nasopharynx [[id number]]  (Normal) Collected: 10/20/24 1130    Lab Status: Final result Specimen: Swab from Nasopharynx Updated: 10/20/24 1220     COVID19 Not Detected     Influenza A PCR Not Detected     Influenza B PCR Not Detected     RSV, PCR Not Detected    Narrative:      Fact sheet for providers: https://www.fda.gov/media/191818/download    Fact sheet for patients: https://www.fda.gov/media/902804/download    Test performed by PCR.           Pain Management Panel          Latest Ref Rng & Units 9/1/2023   Pain Management Panel   Amphetamine, Urine Qual Negative " Negative    Barbiturates Screen, Urine Negative Negative    Benzodiazepine Screen, Urine Negative Negative    Buprenorphine, Screen, Urine Negative Negative    Cocaine Screen, Urine Negative Negative    Fentanyl, Urine Negative Negative    Methadone Screen , Urine Negative Negative    Methamphetamine, Ur Negative Negative       Details                 I have personally reviewed the above laboratory results.   ---------------------------------------------------------------------------------------------------------------------  Imaging Results (Last 7 Days)       Procedure Component Value Units Date/Time    CT Chest Without Contrast Diagnostic [[id number]] Collected: 10/20/24 1528     Updated: 10/20/24 1535    Narrative:      PROCEDURE: CT of the chest performed without IV contrast on October 20, 2024. The examination was performed with 3 mm axial imaging and 3 mm  sagittal and coronal reconstruction images. The examination was  performed according to as low as reasonably achievable dose protocol.  Total . The examination was performed according to as low as  reasonably achievable dose protocol.     HISTORY: Shortness of breath.     COMPARISON: None.     FINDINGS:     Enlarged heart size  Moderate size left pleural effusion.  Small size right pleural effusion.  Emphysematous changes in the upper lobes.  Airspace consolidation with central cavitation in the superior segment  of the left lower lobe consistent with pneumonia.  Airspace consolidation and atelectasis and scarring also noted in the  lower lingula.  Contribution from underlying mass lesion and neoplasm in the left lower  lobe is not excluded.  Focal airspace consolidation with spiculated margins in the anterior  aspect of the left upper lobe adjacent to the pleural margin and seen  best on image 34, series 3.  Markedly enlarged mediastinal lymph nodes suspicious for metastatic  disease to the mediastinum. Markedly enlarged AP window and  subcarinal  and left hilar adenopathy.  Normal thyroid gland.  Sternotomy.  No pericardial effusion.  Coronary arterial vascular calcifications.  Moderate volume of dense calcified plaque along the aortic arch and  descending thoracic aortic segments.  Small cyst in the left hepatic lobe.       Impression:         1.  Heterogeneous area of consolidation in the superior segment of the  left lower lobe with central cavitation consistent with pneumonia with  probable contribution from underlying mass lesion/neoplasm.  2.  Markedly enlarged mediastinal lymph nodes including subcarinal and  AP window lymph nodes with heterogeneous attenuation suggestive of  underlying metastatic disease and neoplastic involvement.  3.  Enlarged heart size.  4.  Small right pleural effusion.  5.  Moderate size left pleural effusion.  6.  Focal area of consolidation in the anterior aspect of the left upper  lobe with spiculated margins suspicious for additional mass lesion.  7.  Emphysema.  8.  No pneumothorax.  9.  Small left hepatic cyst.  10.  Degenerative disc disease throughout the thoracic spine with  endplate and facet hypertrophic changes.        This report was finalized on 10/20/2024 3:33 PM by Chico Modi MD.       CT Abdomen Pelvis Without Contrast [[id number]] Collected: 10/20/24 1235     Updated: 10/20/24 1242    Narrative:      PROCEDURE: CT of the abdomen and pelvis performed without IV contrast on  October 20, 2024. The examination was performed with 4 mm axial imaging  and 4 mm sagittal and coronal reconstruction images. The examination was  performed according to as low as reasonably achievable dose protocol.     HISTORY: Abdominal pain.     COMPARISON: None.     FINDINGS:     Enlarged heart size.  Small size right pleural effusion.  Moderate size left pleural effusion.  Compressive atelectasis at the lung bases, left greater than right.  Sternotomy.  No pericardial effusion.  Mild atelectasis also noted in the lower  lingula.  No acute process seen in the liver, pancreas, adrenal glands, and  kidneys.  A normal appendix is well seen.  Mild constipation throughout the colon.  Mild enlarged prostate gland.  Mild osteoarthritis at the right and left hip joint.  Multilevel degenerative disc disease throughout the lumbar spine with  endplate and facet hypertrophic changes.  No free fluid or free air. No abscess or hematoma.  Small umbilical hernia contains only fat.  Small left hepatic cyst.  Mild ectatic infrarenal abdominal aorta up to 2.69 cm in diameter.  No retroperitoneal hemorrhage.  Renal vascular calcifications on the right and left side.  1 mm calcification at the lower pole of the left kidney seen best on  image 50, series 2 is nonobstructive.       Impression:         1.  Mild enlarged heart size.  2.  Bilateral pleural effusions, left greater than right with some mild  compressive atelectasis at the lung bases.  3.  Normal appendix is well seen.  4.  Slightly ectatic infrarenal abdominal aortic segment.  5.  Small left hepatic cyst.  6.  1 mm calcification in the lower pole of the left kidney.  7.  Degenerative disc disease throughout the lumbar spine with endplate  and facet hypertrophic changes.  8.  Fluid-filled distended bladder.  9.  Mild enlarged prostate gland.  10.  Mild colon and sigmoid colon diverticulosis.  11.  Mild constipation throughout the colon.  12.  No features of enterocolitis.  13.  No diverticulitis.  14.  No free fluid or free air.  15.  No abscess or hematoma.     This report was finalized on 10/20/2024 12:40 PM by Chico Modi MD.       XR Chest AP [[id number]] Collected: 10/20/24 1222     Updated: 10/20/24 1239    Narrative:      PROCEDURE: Chest x-ray examination performed on October 20, 2024. Single  view. Upright position.     HISTORY: Shortness of breath.     COMPARISON: None.     FINDINGS:     Enlarged heart size  Sternotomy.  Coarsened bronchovascular pattern to the lungs.  Airspace  consolidation in the left lower lobe and lower lingula  suggestive of underlying multifocal pneumonia.  No edema.  Small left pleural effusion.   No pneumothorax.       Impression:      Impression:     1.  Enlarged heart size.  2.  Airspace disease in the left lower lobe and lower lingula.  3.  Small left pleural effusion.  4.  Sternotomy.  5.  Coarsened bronchovascular pattern to the lungs  6.  No pneumothorax.        This report was finalized on 10/20/2024 12:24 PM by Chico Modi MD.             I have personally reviewed the above radiology results.     Last Echocardiogram:  No echocardiogram on file  ---------------------------------------------------------------------------------------------------------------------    Assessment & Plan      ACUTE HOSPITAL PROBLEMS    -Acute physical debility, on admission  -Acute on chronic anemia, on admission    CMP and CBC in the a.m.  Physical therapy consult placed  Occupational Therapy consult placed  Monitor hemoglobin level with a.m. labs  No signs of active bleeding    -F/E/N  Replace electrolytes per protocol as necessary.  Consistent carb diet.     CHRONIC MEDICAL PROBLEMS    -Diabetes  -Squamous cell lung cancer  -Basal cell carcinoma  -Macular degeneration  -History of MI  -Diabetic retinopathy    Vital signs per hospital policy  Insulin sliding scale  Blood glucose checks before meals and at bedtime  Continue home medication regimen on pharmacy reconciliation  ---------------------------------------------------  Activity: Bedrest  ---------------------------------------------------  The patient is considered to be a high risk patient due to: Past medical history.    OBSERVATION status; however, if further evaluation or treatment plans warrant, status may change.  Based upon current information, I predict patient's care encounter to be less than or equal to 2 midnights     Code Status: Full  code  ---------------------------------------------------  Disposition/Discharge planning: Consult case management for discharge planning.  ---------------------------------------------------  I have discussed the patient's assessment and plan with attending physician Dakota Valle MD Carl B Gray, APRN     10/21/24  00:25 EDT    Attending Physician: Dakota Bruce MD

## 2024-10-21 NOTE — THERAPY EVALUATION
"Acute Care - Speech Language Pathology   Swallow Initial Evaluation [facility]  Clinical Dysphagia Assessment     Patient Name: Tyler Andrade  : 1940  MRN: [id number]  Today's Date: 10/21/2024  Onset of Illness/Injury or Date of Surgery: 10/20/24     Referring Physician: Beth      Admit Date: 10/20/2024    Visit Dx:     ICD-10-CM ICD-9-CM   1. Pneumonia of left lung due to infectious organism, unspecified part of lung  J18.9 486     Patient Active Problem List   Diagnosis    Basal cell carcinoma    Idiopathic polypoidal choroidal vasculopathy    Lung mass    NSCLC of left lung    Macular degeneration    Old myocardial infarction    Personal history of other endocrine, nutritional and metabolic disease    Diabetic retinopathy    Retinal hemorrhage    Squamous cell lung cancer, left    Inability to walk     History reviewed. No pertinent past medical history.  History reviewed. No pertinent surgical history.    Tyler nAdrade  was seen at bedside this AM on 3N Rm [id number] to assess safety/efficacy of swallowing fnx, determine safest/least restrictive diet tolerance.     Per chart review: \"Patient is a 84 y.o. male with past medical history significant for diabetes, MI, and lung cancer that presented to the [facility] emergency department for evaluation of shortness of breath.  Patient states that he has had some generalized weakness for the past month.  He advises that 2 days ago he became weaker and unable to ambulate on his own.  The patient denies any chest pain or abdominal pain.  The patient states that he is struggling completing daily living activities due to his weakness.  On my examination, patient is alert and oriented x 3.  No acute distress noted.  Patient does have ROM, however patient is struggling with overall strength of movement.  The patient is in stable condition on admission to the hospital. Upon arrival to the ED, vitals were temperature 97.5, /82, heart rate 90, " "respirations 19, SpO2 95%. Labs obtained on arrival: pO2 68, initial troponin 27, reflex troponin 29.  proBNP 5383, sodium 124, chloride 88, glucose 163, CRP 12.93, PT 20.2, INR 1.71, PTT 53.4, WBC 14.19, hemoglobin 9.0, and hematocrit 29.9. Patient has been admitted to the medical/surgical unit for further evaluation and treatment.\"    Social History     Socioeconomic History    Marital status: Single   Tobacco Use    Smoking status: Former     Current packs/day: 0.00     Types: Cigarettes     Quit date:      Years since quittin.8     Passive exposure: Past    Smokeless tobacco: Never   Vaping Use    Vaping status: Never Used   Substance and Sexual Activity    Alcohol use: Never    Drug use: Never    Sexual activity: Defer    Imaging:    Study Result    Narrative & Impression   PROCEDURE: CT of the chest performed without IV contrast on 2024. The examination was performed with 3 mm axial imaging and 3 mm  sagittal and coronal reconstruction images. The examination was  performed according to as low as reasonably achievable dose protocol.  Total . The examination was performed according to as low as  reasonably achievable dose protocol.     HISTORY: Shortness of breath.     COMPARISON: None.     FINDINGS:     Enlarged heart size  Moderate size left pleural effusion.  Small size right pleural effusion.  Emphysematous changes in the upper lobes.  Airspace consolidation with central cavitation in the superior segment  of the left lower lobe consistent with pneumonia.  Airspace consolidation and atelectasis and scarring also noted in the  lower lingula.  Contribution from underlying mass lesion and neoplasm in the left lower  lobe is not excluded.  Focal airspace consolidation with spiculated margins in the anterior  aspect of the left upper lobe adjacent to the pleural margin and seen  best on image 34, series 3.  Markedly enlarged mediastinal lymph nodes suspicious for metastatic  disease " to the mediastinum. Markedly enlarged AP window and subcarinal  and left hilar adenopathy.  Normal thyroid gland.  Sternotomy.  No pericardial effusion.  Coronary arterial vascular calcifications.  Moderate volume of dense calcified plaque along the aortic arch and  descending thoracic aortic segments.  Small cyst in the left hepatic lobe.     IMPRESSION:     1.  Heterogeneous area of consolidation in the superior segment of the  left lower lobe with central cavitation consistent with pneumonia with  probable contribution from underlying mass lesion/neoplasm.  2.  Markedly enlarged mediastinal lymph nodes including subcarinal and  AP window lymph nodes with heterogeneous attenuation suggestive of  underlying metastatic disease and neoplastic involvement.  3.  Enlarged heart size.  4.  Small right pleural effusion.  5.  Moderate size left pleural effusion.  6.  Focal area of consolidation in the anterior aspect of the left upper  lobe with spiculated margins suspicious for additional mass lesion.  7.  Emphysema.  8.  No pneumothorax.  9.  Small left hepatic cyst.  10.  Degenerative disc disease throughout the thoracic spine with  endplate and facet hypertrophic changes.        This report was finalized on 10/20/2024 3:33 PM by Chico Modi MD.     Labs:  Sodium  128  10/21 0104  Potassium  4.6  10/21 0104  Chloride  93  10/21 0104  CO2  26.6  10/21 0104  BUN  18  10/21 0104  Creatinine  1.07  10/21 0104  Glucose  186  10/21 1131  Hemoglobin  8.0  10/21 0104  Hematocrit  26.9  10/21 0104  WBC  11.81  10/21 0104  Platelets  386  10/21 0104  PTT  53.4  10/20 1110  Protime  20.2  10/20 1110  INR  1.71  10/20 1110     Diet Orders (active) (From admission, onward)       Start     Ordered    10/21/24 0549  Diet: Regular/House, Fluid Restriction (240 mL/tray); 1000 mL/day; Texture: Soft to Chew (NDD 3); Soft to Chew: Whole Meat; Fluid Consistency: Thin (IDDSI 0)  Diet Effective Now         10/21/24 0549                  Pt  "is observed on RA.     Patient was positioned upright and centered at EOB bed to accept multiple po presentations of ice chips, solid cracker, puree, and thin liquids via spoon, cup, and straw. Patient was able to self provide po trials.     Facial/oral structures were symmetrical upon observation. Lingual protrusion revealed no deviation. Oral mucosa were moist, pink, and clean. Secretions were clear, thin, and well controlled. OROM/KELLY was wfl to imitate oral postures. Gag is not assessed. Volitional cough was weak w/ low intensity, congested in quality, non-productive. Voice was weak in intensity w/ \"whisper\" voicing.     Upon po presentations, adequate bolus anticipation and acceptance w/ good labial seal for bolus clearance via spoon bowl, cup rim stability and suction via straw. Bolus formation, manipulation and control were wfl w/ rotary mastication pattern. A-p transit was timely w/o significant oral residue appreciated. No overt s/s aspiration before the swallow.      Pharyngeal swallow was timely w/ adequate hyolaryngeal elevation per palpation. Pt is noted with coughing intermittently post swallow. Cough is congested in quality.     Impression: Patient presented w/ s/s concerning for aspiration including coughing post swallow. Pt is agreeable to instrumental Modified Barium Swallow study this date to determine pharyngeal function.     SLP Recommendation and Plan      1. Mechanical soft/whole meat, thin liquids.    2. Medications whole in puree/thins.   3. Upright and centered for all po intake.  4. JOHN precautions.  5. Oral care protocol.  6. Instrumental swallow evaluation to determine pharyngeal function.      D/w patient results and recommendations w/ verbal agreement.    D/w RN results and recommendations w/ verbal agreement.    Thank you for allowing me to participate in the care of your patient-  Celestina Gallardo M.A., CCC-SLP     EDUCATION  The patient has been educated in the following areas: "   Dysphagia (Swallowing Impairment).     Time Calculation:     Therapy Charges for Today       Code Description Service Date Service Provider Modifiers Qty    [id number]  ST EVAL ORAL PHARYNG SWALLOW 4 10/21/2024 Celestina Gallardo MA,CCC-SLP GN 1          Celestina Gallardo MA,CCC-SLP  10/21/2024

## 2024-10-21 NOTE — PLAN OF CARE
Goal Outcome Evaluation:  Plan of Care Reviewed With: patient        Progress: no change  Outcome Evaluation: Patient arrived to the floor from ER. Patient A&Ox4. VSS, no acute changes noted at this time. Will continue plan of care

## 2024-10-22 ENCOUNTER — ANCILLARY PROCEDURE (OUTPATIENT)
Dept: SPEECH THERAPY | Facility: HOSPITAL | Age: 84
DRG: 178 | End: 2024-10-22
Payer: OTHER GOVERNMENT

## 2024-10-22 PROBLEM — J69.0 ASPIRATION PNEUMONIA: Status: ACTIVE | Noted: 2024-10-22

## 2024-10-22 LAB
ANION GAP SERPL CALCULATED.3IONS-SCNC: 7.7 MMOL/L (ref 5–15)
BASOPHILS # BLD AUTO: 0.03 10*3/MM3 (ref 0–0.2)
BASOPHILS NFR BLD AUTO: 0.3 % (ref 0–1.5)
BUN SERPL-MCNC: 24 MG/DL (ref 8–23)
BUN/CREAT SERPL: 18 (ref 7–25)
CALCIUM SPEC-SCNC: 9.9 MG/DL (ref 8.6–10.5)
CHLORIDE SERPL-SCNC: 96 MMOL/L (ref 98–107)
CO2 SERPL-SCNC: 25.3 MMOL/L (ref 22–29)
CREAT SERPL-MCNC: 1.33 MG/DL (ref 0.76–1.27)
DEPRECATED RDW RBC AUTO: 59.6 FL (ref 37–54)
EGFRCR SERPLBLD CKD-EPI 2021: 53 ML/MIN/1.73
EOSINOPHIL # BLD AUTO: 0.1 10*3/MM3 (ref 0–0.4)
EOSINOPHIL NFR BLD AUTO: 0.9 % (ref 0.3–6.2)
ERYTHROCYTE [DISTWIDTH] IN BLOOD BY AUTOMATED COUNT: 18.4 % (ref 12.3–15.4)
GLUCOSE BLDC GLUCOMTR-MCNC: 155 MG/DL (ref 70–130)
GLUCOSE BLDC GLUCOMTR-MCNC: 198 MG/DL (ref 70–130)
GLUCOSE BLDC GLUCOMTR-MCNC: 235 MG/DL (ref 70–130)
GLUCOSE BLDC GLUCOMTR-MCNC: 259 MG/DL (ref 70–130)
GLUCOSE BLDC GLUCOMTR-MCNC: 316 MG/DL (ref 70–130)
GLUCOSE BLDC GLUCOMTR-MCNC: 334 MG/DL (ref 70–130)
GLUCOSE BLDC GLUCOMTR-MCNC: 386 MG/DL (ref 70–130)
GLUCOSE SERPL-MCNC: 150 MG/DL (ref 65–99)
HCT VFR BLD AUTO: 27.1 % (ref 37.5–51)
HGB BLD-MCNC: 8.1 G/DL (ref 13–17.7)
IMM GRANULOCYTES # BLD AUTO: 0.08 10*3/MM3 (ref 0–0.05)
IMM GRANULOCYTES NFR BLD AUTO: 0.7 % (ref 0–0.5)
LYMPHOCYTES # BLD AUTO: 0.58 10*3/MM3 (ref 0.7–3.1)
LYMPHOCYTES NFR BLD AUTO: 5.1 % (ref 19.6–45.3)
MCH RBC QN AUTO: 26.4 PG (ref 26.6–33)
MCHC RBC AUTO-ENTMCNC: 29.9 G/DL (ref 31.5–35.7)
MCV RBC AUTO: 88.3 FL (ref 79–97)
MONOCYTES # BLD AUTO: 0.86 10*3/MM3 (ref 0.1–0.9)
MONOCYTES NFR BLD AUTO: 7.5 % (ref 5–12)
MRSA DNA SPEC QL NAA+PROBE: ABNORMAL
NEUTROPHILS NFR BLD AUTO: 85.5 % (ref 42.7–76)
NEUTROPHILS NFR BLD AUTO: 9.8 10*3/MM3 (ref 1.7–7)
NRBC BLD AUTO-RTO: 0 /100 WBC (ref 0–0.2)
PLATELET # BLD AUTO: 352 10*3/MM3 (ref 140–450)
PMV BLD AUTO: 8.2 FL (ref 6–12)
POTASSIUM SERPL-SCNC: 4.9 MMOL/L (ref 3.5–5.2)
RBC # BLD AUTO: 3.07 10*6/MM3 (ref 4.14–5.8)
SODIUM SERPL-SCNC: 129 MMOL/L (ref 136–145)
WBC NRBC COR # BLD AUTO: 11.45 10*3/MM3 (ref 3.4–10.8)

## 2024-10-22 PROCEDURE — 94667 MNPJ CHEST WALL 1ST: CPT

## 2024-10-22 PROCEDURE — 82948 REAGENT STRIP/BLOOD GLUCOSE: CPT

## 2024-10-22 PROCEDURE — 80048 BASIC METABOLIC PNL TOTAL CA: CPT | Performed by: HOSPITALIST

## 2024-10-22 PROCEDURE — 87070 CULTURE OTHR SPECIMN AEROBIC: CPT | Performed by: HOSPITALIST

## 2024-10-22 PROCEDURE — 94799 UNLISTED PULMONARY SVC/PX: CPT

## 2024-10-22 PROCEDURE — 87040 BLOOD CULTURE FOR BACTERIA: CPT | Performed by: HOSPITALIST

## 2024-10-22 PROCEDURE — 25010000002 METRONIDAZOLE 500 MG/100ML SOLUTION: Performed by: HOSPITALIST

## 2024-10-22 PROCEDURE — 25010000002 CEFTRIAXONE PER 250 MG: Performed by: INTERNAL MEDICINE

## 2024-10-22 PROCEDURE — 63710000001 INSULIN LISPRO (HUMAN) PER 5 UNITS

## 2024-10-22 PROCEDURE — 94761 N-INVAS EAR/PLS OXIMETRY MLT: CPT

## 2024-10-22 PROCEDURE — 25810000003 SODIUM CHLORIDE 0.9 % SOLUTION: Performed by: HOSPITALIST

## 2024-10-22 PROCEDURE — 99232 SBSQ HOSP IP/OBS MODERATE 35: CPT | Performed by: HOSPITALIST

## 2024-10-22 PROCEDURE — 87641 MR-STAPH DNA AMP PROBE: CPT | Performed by: HOSPITALIST

## 2024-10-22 PROCEDURE — 85025 COMPLETE CBC W/AUTO DIFF WBC: CPT | Performed by: HOSPITALIST

## 2024-10-22 PROCEDURE — 92526 ORAL FUNCTION THERAPY: CPT | Performed by: SPEECH-LANGUAGE PATHOLOGIST

## 2024-10-22 PROCEDURE — 94664 DEMO&/EVAL PT USE INHALER: CPT

## 2024-10-22 PROCEDURE — 25010000002 CEFEPIME PER 500 MG: Performed by: HOSPITALIST

## 2024-10-22 PROCEDURE — 87205 SMEAR GRAM STAIN: CPT | Performed by: HOSPITALIST

## 2024-10-22 PROCEDURE — 99232 SBSQ HOSP IP/OBS MODERATE 35: CPT | Performed by: INTERNAL MEDICINE

## 2024-10-22 PROCEDURE — 92612 ENDOSCOPY SWALLOW (FEES) VID: CPT

## 2024-10-22 PROCEDURE — 25010000002 VANCOMYCIN 5 G RECONSTITUTED SOLUTION: Performed by: HOSPITALIST

## 2024-10-22 RX ORDER — IPRATROPIUM BROMIDE AND ALBUTEROL SULFATE 2.5; .5 MG/3ML; MG/3ML
3 SOLUTION RESPIRATORY (INHALATION)
Status: DISCONTINUED | OUTPATIENT
Start: 2024-10-22 | End: 2024-10-27 | Stop reason: HOSPADM

## 2024-10-22 RX ORDER — METRONIDAZOLE 500 MG/100ML
500 INJECTION, SOLUTION INTRAVENOUS EVERY 8 HOURS
Status: DISCONTINUED | OUTPATIENT
Start: 2024-10-22 | End: 2024-10-27 | Stop reason: HOSPADM

## 2024-10-22 RX ORDER — ACETYLCYSTEINE 200 MG/ML
3 SOLUTION ORAL; RESPIRATORY (INHALATION)
Status: DISCONTINUED | OUTPATIENT
Start: 2024-10-22 | End: 2024-10-27 | Stop reason: HOSPADM

## 2024-10-22 RX ORDER — GUAIFENESIN 200 MG/10ML
200 LIQUID ORAL EVERY 4 HOURS PRN
Status: DISCONTINUED | OUTPATIENT
Start: 2024-10-22 | End: 2024-10-27 | Stop reason: HOSPADM

## 2024-10-22 RX ADMIN — DOXYCYCLINE 100 MG: 100 INJECTION, POWDER, LYOPHILIZED, FOR SOLUTION INTRAVENOUS at 05:53

## 2024-10-22 RX ADMIN — CEFEPIME 2000 MG: 2 INJECTION, POWDER, FOR SOLUTION INTRAVENOUS at 14:34

## 2024-10-22 RX ADMIN — PANTOPRAZOLE SODIUM 40 MG: 40 TABLET, DELAYED RELEASE ORAL at 05:53

## 2024-10-22 RX ADMIN — VANCOMYCIN HYDROCHLORIDE 1750 MG: 500 INJECTION, POWDER, LYOPHILIZED, FOR SOLUTION INTRAVENOUS at 17:38

## 2024-10-22 RX ADMIN — METRONIDAZOLE 500 MG: 500 INJECTION, SOLUTION INTRAVENOUS at 19:38

## 2024-10-22 RX ADMIN — FOLIC ACID 1 MG: 1 TABLET ORAL at 08:35

## 2024-10-22 RX ADMIN — INSULIN LISPRO 4 UNITS: 100 INJECTION, SOLUTION INTRAVENOUS; SUBCUTANEOUS at 11:22

## 2024-10-22 RX ADMIN — CEFTRIAXONE 1 G: 1 INJECTION, POWDER, FOR SOLUTION INTRAMUSCULAR; INTRAVENOUS at 05:54

## 2024-10-22 RX ADMIN — INSULIN LISPRO 6 UNITS: 100 INJECTION, SOLUTION INTRAVENOUS; SUBCUTANEOUS at 17:37

## 2024-10-22 RX ADMIN — ATORVASTATIN CALCIUM 20 MG: 20 TABLET, FILM COATED ORAL at 21:44

## 2024-10-22 RX ADMIN — Medication 10 ML: at 08:39

## 2024-10-22 RX ADMIN — ACETYLCYSTEINE 3 ML: 200 SOLUTION ORAL; RESPIRATORY (INHALATION) at 18:17

## 2024-10-22 RX ADMIN — IPRATROPIUM BROMIDE AND ALBUTEROL SULFATE 3 ML: 2.5; .5 SOLUTION RESPIRATORY (INHALATION) at 12:29

## 2024-10-22 RX ADMIN — Medication 10 ML: at 21:58

## 2024-10-22 RX ADMIN — APIXABAN 5 MG: 5 TABLET, FILM COATED ORAL at 08:35

## 2024-10-22 RX ADMIN — IPRATROPIUM BROMIDE AND ALBUTEROL SULFATE 3 ML: 2.5; .5 SOLUTION RESPIRATORY (INHALATION) at 18:17

## 2024-10-22 RX ADMIN — FERROUS SULFATE TAB 325 MG (65 MG ELEMENTAL FE) 325 MG: 325 (65 FE) TAB at 08:38

## 2024-10-22 RX ADMIN — INSULIN LISPRO 3 UNITS: 100 INJECTION, SOLUTION INTRAVENOUS; SUBCUTANEOUS at 08:38

## 2024-10-22 RX ADMIN — Medication 200 MG: at 21:44

## 2024-10-22 RX ADMIN — Medication 200 MG: at 14:34

## 2024-10-22 RX ADMIN — SERTRALINE 100 MG: 50 TABLET, FILM COATED ORAL at 08:37

## 2024-10-22 RX ADMIN — Medication 200 MG: at 05:53

## 2024-10-22 RX ADMIN — APIXABAN 5 MG: 5 TABLET, FILM COATED ORAL at 21:44

## 2024-10-22 RX ADMIN — METRONIDAZOLE 500 MG: 500 INJECTION, SOLUTION INTRAVENOUS at 11:22

## 2024-10-22 RX ADMIN — OXYCODONE HYDROCHLORIDE AND ACETAMINOPHEN 250 MG: 500 TABLET ORAL at 08:36

## 2024-10-22 RX ADMIN — INSULIN LISPRO 5 UNITS: 100 INJECTION, SOLUTION INTRAVENOUS; SUBCUTANEOUS at 21:44

## 2024-10-22 NOTE — PLAN OF CARE
Goal Outcome Evaluation:  Plan of Care Reviewed With: patient        Progress: no change     Pt resting in bed, VSS on 2 liters NC. Pt had Fiberoptic endo done this shift. Pt voices no concerns or complaints at this time, will continue with POC.

## 2024-10-22 NOTE — CASE MANAGEMENT/SOCIAL WORK
Discharge Planning Assessment  [facility] [place]     Patient Name: Tyler Andrade  MRN: [id number]  Today's Date: 10/22/2024    Admit Date: 10/20/2024            Discharge Plan       Row Name 10/22/24 1547       Plan    Plan SS spoke with Pt at bedside on this date. Pt states he has radiation scheduled at [facility] for tomorrow. Pt requested that SS speak with his dtr [place]. Per dtr [place] she has been working with [facility] JANETH Barnes states Pt has been given a room at [facility] to stay for his radiation until 11/29/24. SS contacted [facility] [phone] per Giovanni who states Pt does not have insurance benefits with the [facility] that covers [facility]. Per dtr, a cousin plans to transport Pt. Pt discussed with Physician on this date. Pt's dtr requested  a call with an update when estimated discharge date is determined. SS notified Provider. SS to follow.                    CYDNEY RobersonW

## 2024-10-22 NOTE — PROGRESS NOTES
[facility] HOSPITALIST PROGRESS NOTE     Patient Identification:  Name:  Tyler Andrade  Age:  84 y.o.  Sex:  male  :  1940  MRN:  [id number]  Visit Number:  [id number]  Primary Care Provider:  Skyla Faustin MD    Length of stay:  0    Subjective: Patient seen and examined, he is somewhat irritable, having difficulty in coughing which unfortunately because of dysphonia and will likely his tumor affecting the left recurrent glandular rendering him unable to cough effectively.  Also confirmed suspicion of silent aspiration on modified barium swallow per speech evaluation.  Patient is somewhat irritated and upset feeling helpless.  Explained to him our plan continue antibiotic and trial of Mucomyst and chest percussion.  Ultimately, treatment will be depending on his cancer.  He is post to have radiation tomorrow but because of this, likely be canceled.  Will attempt to call today [place] where he gets his radiation and discussed the case.  His dysphonia and generalized weakness has been ongoing for the past 3 to 4 months has been gradual but worse this past week.    Chief Complaint: Generalized weakness  ----------------------------------------------------------------------------------------------------------------------  Current Hospital Meds:  acetylcysteine, 3 mL, Nebulization, BID - RT  apixaban, 5 mg, Oral, BID  ascorbic acid, 250 mg, Oral, Daily  atorvastatin, 20 mg, Oral, Nightly  ferrous sulfate, 325 mg, Oral, Daily With Breakfast  folic acid, 1 mg, Oral, Daily  insulin lispro, 2-7 Units, Subcutaneous, 4x Daily AC & at Bedtime  ipratropium-albuterol, 3 mL, Nebulization, TID - RT  pantoprazole, 40 mg, Oral, Q AM  sertraline, 100 mg, Oral, Daily  sodium chloride, 10 mL, Intravenous, Q12H  vitamin B-1, 200 mg, Oral, Q8H      Pharmacy Consult - Pharmacy to dose,        ----------------------------------------------------------------------------------------------------------------------  Vital Signs:  Temp:  [97.3 °F (36.3 °C)-98.7 °F (37.1 °C)] 98.6 °F (37 °C)  Heart Rate:  [69-93] 92  Resp:  [18-20] 20  BP: (111-137)/(59-72) 122/59       Tele:       10/20/24  2240 10/21/24  0500 10/22/24  0500   Weight: 86.5 kg (190 lb 11.2 oz) (Anthony Lopes) 86.5 kg (190 lb 11.2 oz) 85.7 kg (189 lb)     Body mass index is 27.91 kg/m².    Intake/Output Summary (Last 24 hours) at 10/22/2024 1048  Last data filed at 10/22/2024 0900  Gross per 24 hour   Intake 720 ml   Output 600 ml   Net 120 ml     Diet: Regular/House, Fluid Restriction (240 mL/tray); 1000 mL/day; Texture: Soft to Chew (NDD 3); Soft to Chew: Whole Meat; Fluid Consistency: Nectar Thick  ----------------------------------------------------------------------------------------------------------------------  Physical exam:  General: Comfortable,awake, alert, oriented to self, place, and time, well-developed and well-nourished.  No respiratory distress.    Skin:  Skin is warm and dry. No rash noted. No pallor.    HENT:  Head:  Normocephalic and atraumatic.  Mouth:  Moist mucous membranes.    Eyes:  Conjunctivae and EOM are normal.  Pupils are equal, round, and reactive to light.  No scleral icterus.    Neck:  Neck supple.  No JVD present.    Pulmonary/Chest:  No respiratory distress, no wheezes, no crackles, with normal breath sounds and good air movement.  Cardiovascular:  Normal rate, regular rhythm and normal heart sounds with no murmur.  Abdominal:  Soft.  Bowel sounds are normal.  No distension and no tenderness.   Extremities:  No edema, no tenderness, and no deformity.  No red or swollen joints anywhere.  Strong pulses in all 4 extremities with no clubbing, no cyanosis, no edema.  Neurological:  Motor strength equal no obvious deficit, sensory grossly intact.   No cranial nerve deficit.  No tongue deviation.  No facial  "droop.  No slurred speech.    Genitourinary:   Back:  ----------------------------------------------------------------------------------------------------------------------  ----------------------------------------------------------------------------------------------------------------------  Results from last 7 days   Lab Units 10/20/24  1309 10/20/24  1110   HSTROP T ng/L 29* 27*     Results from last 7 days   Lab Units 10/22/24  0153 10/21/24  0104 10/20/24  1110   CRP mg/dL  --   --  12.93*   LACTATE mmol/L  --   --  2.0   WBC 10*3/mm3 11.45* 11.81* 14.19*   HEMOGLOBIN g/dL 8.1* 8.0* 9.0*   HEMATOCRIT % 27.1* 26.9* 29.9*   MCV fL 88.3 87.1 86.9   MCHC g/dL 29.9* 29.7* 30.1*   PLATELETS 10*3/mm3 352 386 433   INR   --   --  1.71*     Results from last 7 days   Lab Units 10/20/24  1112   PH, ARTERIAL pH units 7.430   PO2 ART mm Hg 68.0*   PCO2, ARTERIAL mm Hg 38.0   HCO3 ART mmol/L 25.2     Results from last 7 days   Lab Units 10/22/24  0154 10/21/24  0104 10/20/24  1110   SODIUM mmol/L 129* 128* 124*   POTASSIUM mmol/L 4.9 4.6 4.6   MAGNESIUM mg/dL  --  1.8  --    CHLORIDE mmol/L 96* 93* 88*   CO2 mmol/L 25.3 26.6 25.5   BUN mg/dL 24* 18 16   CREATININE mg/dL 1.33* 1.07 0.93   CALCIUM mg/dL 9.9 10.0 10.3   GLUCOSE mg/dL 150* 68 163*   ALBUMIN g/dL  --  2.7* 3.1*   BILIRUBIN mg/dL  --  0.2 0.3   ALK PHOS U/L  --  82 93   AST (SGOT) U/L  --  22 24   ALT (SGPT) U/L  --  24 28   Estimated Creatinine Clearance: 45.7 mL/min (A) (by C-G formula based on SCr of 1.33 mg/dL (H)).    No results found for: \"AMMONIA\"      Blood Culture   Date Value Ref Range Status   10/20/2024 No growth at 24 hours  Preliminary   10/20/2024 No growth at 24 hours  Preliminary     No results found for: \"URINECX\"  No results found for: \"WOUNDCX\"  No results found for: \"STOOLCX\"    I have personally looked at the labs and they are summarized " above.  ----------------------------------------------------------------------------------------------------------------------  Imaging Results (Last 24 Hours)       Procedure Component Value Units Date/Time    FL Video Swallow Single Contrast [[id number]] Collected: 10/21/24 1433     Updated: 10/21/24 1435    Narrative:      FL VIDEO SWALLOW SINGLE-CONTRAST-     CLINICAL INDICATION: r/o aspiration; J18.9-Pneumonia, unspecified  organism        TECHNIQUE:   Speech therapy service was present. The patient was examined in the  sitting lateral position and was given several consistencies of barium.     FINDINGS: Aspiration with thin liquids     FLUOROSCOPY TIME: 0.9-minute     Images: Cine loop was acquired       Impression:      Aspiration with thin liquids     For additional information please see the report provided by the speech  therapy service     This report was finalized on 10/21/2024 2:33 PM by Dr. David Lopez MD.             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  -Silent aspiration with thin liquids likely secondary to left laryngeal nerve involvement  -Acute on chronic debility  -History of diabetes with neuropathy  -History of squamous cell carcinoma of the lung currently receiving radiation  -History of basal cell carcinoma face  -History of essential hypertension  -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  For now empirically will continue  antimicrobial to include coverage for postobstructive pneumonia as well as possible aspiration pneumonia.    Disposition pending      Nurys Campos MD  10/22/24  10:48 EDT

## 2024-10-22 NOTE — MBS/VFSS/FEES
"Acute Care - Speech Language Pathology   Swallow Re-Evaluation [facility]  FLEXIBLE ENDOSCOPIC EVALUATION OF SWALLOWING     Patient Name: Tyler Andrade  : 1940  MRN: [id number]  Today's Date: 10/22/2024  Onset of Illness/Injury or Date of Surgery: 10/20/24     Referring Physician: Beth      Admit Date: 10/20/2024    Visit Dx:     ICD-10-CM ICD-9-CM   1. Pneumonia of left lung due to infectious organism, unspecified part of lung  J18.9 486     Patient Active Problem List   Diagnosis    Basal cell carcinoma    Idiopathic polypoidal choroidal vasculopathy    Lung mass    NSCLC of left lung    Macular degeneration    Old myocardial infarction    Personal history of other endocrine, nutritional and metabolic disease    Diabetic retinopathy    Retinal hemorrhage    Squamous cell lung cancer, left    Inability to walk    Aspiration pneumonia     History reviewed. No pertinent past medical history.  History reviewed. No pertinent surgical history.    Tyler Andrade was seen at bedside this pm on 3N to participate in an instrumental FEES to objectively re-evaluate safety/efficacy of swallowing fnx, determine safest/least restrictive diet tolerance. FEES was requested by Dr. Campos today 2/2 vocal dysphonia and to further r/o aspiration.     Per review of EMR, \"Patient is a 84 y.o. male with past medical history significant for diabetes, MI, and lung cancer that presented to the [facility] emergency department for evaluation of shortness of breath.  Patient states that he has had some generalized weakness for the past month.  He advises that 2 days ago he became weaker and unable to ambulate on his own.  The patient denies any chest pain or abdominal pain.  The patient states that he is struggling completing daily living activities due to his weakness.  On my examination, patient is alert and oriented x 3.  No acute distress noted.  Patient does have ROM, however patient is struggling with overall " "strength of movement.  The patient is in stable condition on admission to the hospital. Upon arrival to the ED, vitals were temperature 97.5, /82, heart rate 90, respirations 19, SpO2 95%. Labs obtained on arrival: pO2 68, initial troponin 27, reflex troponin 29.  proBNP 5383, sodium 124, chloride 88, glucose 163, CRP 12.93, PT 20.2, INR 1.71, PTT 53.4, WBC 14.19, hemoglobin 9.0, and hematocrit 29.9. Patient has been admitted to the medical/surgical unit for further evaluation and treatment.\"     He was referred for dysphagia evaluation upon admission  PNA w/ concern for aspiration component. He participated in MBS 10/21/24, \"presented w/ mild oral phase dysphagia, moderate pharyngeal phase dysphagia. Pt with silent aspiration of thin liquids via tsp and straw. Pt with aspiration of thin liquids via cup with cough response elicited per today's evaluation. Pt will most benefit from modified po diet of mechanical soft consistencies, NECTAR thick liquids. Please consider ENT consult for persistent dysphonia/aphonia that pt reports started around 2024.\" He was recommended for dysphagia tx as tolerated per poc.     Social History     Socioeconomic History    Marital status: Single   Tobacco Use    Smoking status: Former     Current packs/day: 0.00     Types: Cigarettes     Quit date:      Years since quittin.8     Passive exposure: Past    Smokeless tobacco: Never   Vaping Use    Vaping status: Never Used   Substance and Sexual Activity    Alcohol use: Never    Drug use: Never    Sexual activity: Defer      Imaging:  Narrative & Impression   PROCEDURE: CT of the chest performed without IV contrast on 2024. The examination was performed with 3 mm axial imaging and 3 mm  sagittal and coronal reconstruction images. The examination was  performed according to as low as reasonably achievable dose protocol.  Total . The examination was performed according to as low as  reasonably " achievable dose protocol.     HISTORY: Shortness of breath.     COMPARISON: None.     FINDINGS:     Enlarged heart size  Moderate size left pleural effusion.  Small size right pleural effusion.  Emphysematous changes in the upper lobes.  Airspace consolidation with central cavitation in the superior segment  of the left lower lobe consistent with pneumonia.  Airspace consolidation and atelectasis and scarring also noted in the  lower lingula.  Contribution from underlying mass lesion and neoplasm in the left lower  lobe is not excluded.  Focal airspace consolidation with spiculated margins in the anterior  aspect of the left upper lobe adjacent to the pleural margin and seen  best on image 34, series 3.  Markedly enlarged mediastinal lymph nodes suspicious for metastatic  disease to the mediastinum. Markedly enlarged AP window and subcarinal  and left hilar adenopathy.  Normal thyroid gland.  Sternotomy.  No pericardial effusion.  Coronary arterial vascular calcifications.  Moderate volume of dense calcified plaque along the aortic arch and  descending thoracic aortic segments.  Small cyst in the left hepatic lobe.     IMPRESSION:     1.  Heterogeneous area of consolidation in the superior segment of the  left lower lobe with central cavitation consistent with pneumonia with  probable contribution from underlying mass lesion/neoplasm.  2.  Markedly enlarged mediastinal lymph nodes including subcarinal and  AP window lymph nodes with heterogeneous attenuation suggestive of  underlying metastatic disease and neoplastic involvement.  3.  Enlarged heart size.  4.  Small right pleural effusion.  5.  Moderate size left pleural effusion.  6.  Focal area of consolidation in the anterior aspect of the left upper  lobe with spiculated margins suspicious for additional mass lesion.  7.  Emphysema.  8.  No pneumothorax.  9.  Small left hepatic cyst.  10.  Degenerative disc disease throughout the thoracic spine with  endplate  and facet hypertrophic changes.        This report was finalized on 10/20/2024 3:33 PM by Chico Modi MD.     Labs:  Sodium  129  10/22 0154  Potassium  4.9  10/22 0154  Chloride  96  10/22 0154  CO2  25.3  10/22 0154  BUN  24  10/22 0154  Creatinine  1.33  10/22 0154  Glucose  259  10/22 1039  Hemoglobin  8.1  10/22 0153  Hematocrit  27.1  10/22 0153  WBC  11.45  10/22 0153  Platelets  352  10/22 0153  PTT  53.4  10/20 1110  Protime  20.2  10/20 1110  INR  1.71  10/20 1110     Diet Orders (active) (From admission, onward)       Start     Ordered    10/21/24 1800  Dietary Nutrition Supplements Magic Cup  Daily With Lunch & Dinner       10/21/24 1718    10/21/24 1447  Diet: Regular/House, Fluid Restriction (240 mL/tray); 1000 mL/day; Texture: Soft to Chew (NDD 3); Soft to Chew: Whole Meat; Fluid Consistency: Nectar Thick  Diet Effective Now         10/21/24 1446                  Currently observed on 2L O2 via NC w/o complications.     Risks and benefits of this procedure were explained w/ patient agreeing to participate.    Chart review, d/w RN and MD revealed no contraindications for this procedure. Proceed per protocol.     Patient was positioned upright and centered in bed to accept presentations of puree and nectar thick liquids via spoon and straw. He declined solid cracker during evaluation. He did not to self provide po trials during this evaluation.       Facial/oral structures were grossly symmetrical upon observation. Noted diffuse facial scarring, most notably on bridge of nose and cheekbones from h/o skin CA, s/p radiation. Noted lesions on his nose concerning for active skin CA as well. Oral mucosa were moist, pink, and clean. Secretions were clear, thin, and controlled. OROM/KELLY was WFL to imitate oral postures. Gag was not assessed. Volitional cough was intact, slightly weak in intensity, congestive in quality, non-productive. Vocal quality was weak in intensity, significantly rough and breathy in  quality w/ intelligible speech to careful listening. He was oriented, followed directives and participated in simple conversational exchanges.      Endoscope was entered via the L nare w/o complications. Nasal mucosa were mildly xerostomic, w/ tacky accumulations at the nasal entrance. Secretions did stick to endoscope, partially obstructing view. Endoscope passed easily into the hypopharynx, and obstruction was cleared w/ volitional swallow.     Upon entry into the hypopharynx, epiglottic resting posture was midline. Pharyngeal and laryngeal mucosa/structures were moist, pink, and clean. No significant secretion pooling. No significant edema or erythema appreciated. Asymmetrical appearance noted of the L arytenoid, and slight bowed appearance of the L true VF. Cued sustained breath hold and vowel prolongation revealed appearance concerning for paresis vs. paralysis of the L true VF and arytenoid horn. R true VF and arytenoid were evidenced to overcompensate, however, complete adduction was not achieved. Noted rounded granulated appearing lesion of the posterior L true VF. No other obvious mucosal abnormalities appreciated per observation.     Upon po presentations, adequate bolus anticipation w/ good labial seal for bolus clearance. Bolus formation, manipulation, and mastication were mildly weak in general. Transit was timely w/o significant oral residue. Tongue base retraction and velopharyngeal seal were complete w/o significant premature spillage. No laryngeal penetration or aspiration evidenced before the swallow.     Pharyngeal swallow was timely w/ mildly weak hyolaryngeal elevation and full epglottic inversion. Pharyngeal contraction was WFL w/o significant residue. No laryngeal penetration or aspiration evidenced during or after the swallow.     Endoscope was removed w/o complications. Bed was returned to lowest floor position w/ call bell within reach. RN was aware of patient status.     Impression: Per  this evaluation, Mr. Andrade presented w/ swallowing fnx consistent w/ MBS 10/21/24 w/ a mild oral dysphagia, mild-mod pharyngeal dysphagia. No evidence of aspiration w/ modified diet/thickened liquids. No laryngeal penetration or aspiration evidenced across this evaluation.     He is felt to most benefit from continuing current modified po diet of mechanical soft consistencies, NECTAR thick liquids. Will f/u for formal dysphagia tx as tolerated per poc. Recommend consideration of ENT referral pend status for further evaluation of appearance of L vocal fold/arytenoid paresis vs. Paralysis.     SLP Recommendation and Plan  1. Mechanical soft/whole meats, NECTAR thick liquids.  2. Medications whole in puree.   3. JOHN precautions.   4. Oral care protocol.   5. Pt may have ice/water protocol between meals.  6. Please consider ENT referral pend status/transfer.   SLP to follow up for dysphagia tx.     D/w patient results and recommendations w/ verbal agreement.     D/w RN results and recommendations w/ verbal agreement.     D/w MD results and recommendations w/ verbal agreement. Reviewed FEES images/video w/ Dr. Campos. 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 He indicated pt was supposed to begin treatment for his lung cancer in [place] tomorrow, however, will likely be delayed due to current admission. He indicated possible transfer to [place] per this status.      Thank you for allowing me to participate in the care of your patient-  Carissa Gonzalez M.A., CCC-SLP      EDUCATION  The patient has been educated in the following areas:   Voice Disorder Dysphagia (Swallowing Impairment) Oral Care/Hydration Modified Diet Instruction.      Time Calculation:    Time Calculation- SLP       Row Name 10/22/24 1408             Time Calculation- SLP    SLP - Next Appointment 10/23/24  -                User Key  (r)  = Recorded By, (t) = Taken By, (c) = Cosigned By      Initials Name Provider Type     Celestina Gallardo MA,CCC-SLP Speech and Language Pathologist                  Therapy Charges for Today       Code Description Service Date Service Provider Modifiers Qty    05598438859  ST FIBEROPTIC ENDO EVAL SWALL 2 10/22/2024 Carissa Gonzalez MA,CCC-SLP GN 1          Carissa Gonzalez MA,CCC-SLP  10/22/2024

## 2024-10-22 NOTE — THERAPY TREATMENT NOTE
Acute Care - Speech Language Pathology   Swallow Treatment Note  [facility]     Patient Name: Tyler Andrade  : 1940  MRN: [id number]  Today's Date: 10/22/2024  Onset of Illness/Injury or Date of Surgery: 10/20/24     Referring Physician: Beth      Admit Date: 10/20/2024    Visit Dx:     ICD-10-CM ICD-9-CM   1. Pneumonia of left lung due to infectious organism, unspecified part of lung  J18.9 486     Patient Active Problem List   Diagnosis    Basal cell carcinoma    Idiopathic polypoidal choroidal vasculopathy    Lung mass    NSCLC of left lung    Macular degeneration    Old myocardial infarction    Personal history of other endocrine, nutritional and metabolic disease    Diabetic retinopathy    Retinal hemorrhage    Squamous cell lung cancer, left    Inability to walk    Aspiration pneumonia     History reviewed. No pertinent past medical history.  History reviewed. No pertinent surgical history.    DYSPHAGIA THERAPY PLAN OF CARE:     Tyler Andrade was seen at bedside this date in Rm [id number]. Pt sitting EOB for tx session. Pt observed on 2L O2 via NC.     Long Term Goal:  Patient will accept least restrictive diet tolerance w/o overt s/s aspiration.      Short Term Goals:  1. Patient will perform OROM/KELLY exercises x3 sets x10 reps w/ min cues.  *deferred.   2. Patient will perform resistive breathing exercises x7sets x7 reps w/resistance of 1-5 to improve respiratory support and control.   *deferred.   3. Patient will perform CTAR exercises sustained x3 sets x5 reps for 30+ seconds over 3 consecutive sessions.   *deferred.   4. Patient will perform CTAR exercises repetitive x3 sets x12 reps w/ mod cues.   *deferred.   5. Patient will perform compensatory techniques during meals w/ min cues.   *SLP d/w pt strategies to reduce risk of aspiration including explanation of thickened liquids and how to thicken to nectar consistency.     6. Patient will accept ice/chip water per protocol between meals and  medications for oral hydration/comfort with assistance.   *Pt educated on water protocol. Pt with teachback at 70%. Pt able to demonstrate small sips with no overt s/s of aspiration evidenced. Pt with silent aspiration established with MBS 10/21/24.    7. Patient will participate in instrumental re-evaluation of swallowing fnx in 3-5 days, pending progress towards this poc.     SLP Recommendation and Plan   1. Continue POC.      D/w MD possible FEES.      Thank you for allowing me to participate in the care of your patient-  Celestina Gallardo M.A., CCC-SLP     EDUCATION  The patient has been educated in the following areas:   Dysphagia (Swallowing Impairment) Oral Care/Hydration Modified Diet Instruction.      Time Calculation:    Time Calculation- SLP       Row Name 10/22/24 1408             Time Calculation- SLP    SLP - Next Appointment 10/23/24  -                User Key  (r) = Recorded By, (t) = Taken By, (c) = Cosigned By      Initials Name Provider Type     Celestina Gallardo MA,CCC-SLP Speech and Language Pathologist                  Therapy Charges for Today       Code Description Service Date Service Provider Modifiers Qty    34180302959 HC ST EVAL ORAL PHARYNG SWALLOW 4 10/21/2024 Celestina Gallardo MA,CCC-SLP GN 1    [id number] HC ST MOTION FLUORO EVAL SWALLOW 8 10/21/2024 Celestina Gallardo MA,CCC-SLP GN 1    [id number] HC ST TREATMENT SWALLOW 2 10/22/2024 Celestina Gallardo MA,CCC-SLP GN 1          Celestina Gallardo MA,CCC-SLP  10/22/2024

## 2024-10-22 NOTE — PAYOR COMM NOTE
"CONTACT: AYAAN NAIK RN  UTILIZATION MANAGEMENT DEPT.  [facility]  [address] [id number]  PHONE: [phone]  FAX: [phone]      INPATIENT AUTH REQUEST    PLACED IN OBS ON 10/20/24, CONVERTED TO INPT ON 10/22/24    REF# [id number]      Albin Andrade (84 y.o. Male)       Date of Birth   1940    Social Security Number       Address   [address] [id number]    Home Phone   [phone]    MRN   [id number]       Mosque   None    Marital Status   Single                            Admission Date   10/20/24    Admission Type   Emergency    Admitting Provider   Dakota Bruce MD    Attending Provider   Nurys Campos MD    Department, Room/Bed   [address]/       Discharge Date       Discharge Disposition       Discharge Destination                                 Attending Provider: Nurys Campos MD    Allergies: No Known Allergies    Isolation: None   Infection: None   Code Status: CPR    Ht: 175.3 cm (69\")   Wt: 85.7 kg (189 lb)    Admission Cmt: None   Principal Problem: Inability to walk [R26.2]                   Active Insurance as of 10/20/2024       Primary Coverage       Payor Plan Insurance Group Employer/Plan Group    [employer] [place] [employer]        Payor Plan Address Payor Plan Phone Number Payor Plan Fax Number Effective Dates    [address] [phone]  12/1/2022 - None Entered    [facility] [id number]         Subscriber Name Subscriber Birth Date Member ID       ALBIN ANDRADE 1940 [id number]               Secondary Coverage       Payor Plan Insurance Group Employer/Plan Group    [employer] [place] DEPT [id number]        Payor Plan Address Payor Plan Phone Number Payor Plan Fax Number Effective Dates    [facility] COMMUN[facility] [phone]  5/21/2021 - None Entered    [address] [address]       [place] [address]         Subscriber Name Subscriber Birth Date Member ID       ALBIN ANDRADE " 1940 [id number]               Tertiary Coverage       Payor Plan Insurance Group Employer/Plan Group    [employer] MEDICARE REPLACEMENT [employer] MED ADV PPO [id number]       Payor Plan Address Payor Plan Phone Number Payor Plan Fax Number Effective Dates    [address] [address] [phone]  2023 - None Entered    [facility] [address]         Subscriber Name Subscriber Birth Date Member ID       ALBIN ANDRADE 1940 [id number]                     Emergency Contacts        (Rel.) Home Phone Work Phone Mobile Phone    PERRY ANDRADE (Daughter) [phone] -- --                 History & Physical        Andrade Jensen APRN at 10/20/24 2037       Attestation signed by Dakota Bruce MD at 10/21/24 0981    I have reviewed this documentation and agree.  I have discussed and formulated the assessment and plan with PHIL Jones.  I have also discussed the patient with the ED attending and reviewed the patient's past medical history, labs, vital signs, imaging, and EKGs.    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  Will order PT, OT, speech, and case management/.  Will trend the sodium levels and place him on a 1000 mL fluid restriction.                     [facility]  HISTORY & PHYSICAL    Patient Identification:  Name:  Albin Andrade  Age:  84 y.o.  Sex:  male  :  1940  MRN:  [id number]   Visit Number:  [id number]  Admit Date: 10/20/2024   Primary Care Physician:  Provider, No Known     Subjective     Chief complaint:   Chief Complaint   Patient presents with    Shortness of Breath     History of presenting illness:   Patient is a 84 y.o. male with past medical history  significant for diabetes, MI, and lung cancer that presented to the [facility] emergency department for evaluation of shortness of breath.  Patient states that he has had some generalized weakness for the past month.  He advises that 2 days ago he became weaker and unable to ambulate on his own.  The patient denies any chest pain or abdominal pain.  The patient states that he is struggling completing daily living activities due to his weakness.  On my examination, patient is alert and oriented x 3.  No acute distress noted.  Patient does have ROM, however patient is struggling with overall strength of movement.  The patient is in stable condition on admission to the hospital. Upon arrival to the ED, vitals were temperature 97.5, /82, heart rate 90, respirations 19, SpO2 95%. Labs obtained on arrival: pO2 68, initial troponin 27, reflex troponin 29.  proBNP 5383, sodium 124, chloride 88, glucose 163, CRP 12.93, PT 20.2, INR 1.71, PTT 53.4, WBC 14.19, hemoglobin 9.0, and hematocrit 29.9. Patient has been admitted to the medical/surgical unit for further evaluation and treatment.     Present during exam: RN  ---------------------------------------------------------------------------------------------------------------------   Review of Systems   Constitutional:  Positive for fatigue. Negative for chills and fever.   HENT: Negative.  Negative for congestion, sore throat and trouble swallowing.    Eyes: Negative.    Respiratory: Negative.  Negative for cough, shortness of breath and wheezing.    Cardiovascular: Negative.  Negative for chest pain, palpitations and leg swelling.   Gastrointestinal: Negative.  Negative for abdominal pain, diarrhea, nausea and vomiting.   Endocrine: Negative.    Genitourinary: Negative.  Negative for dysuria.   Musculoskeletal: Negative.  Negative for neck pain and neck stiffness.   Skin: Negative.    Allergic/Immunologic: Negative.    Neurological:  Positive for weakness.  Negative for dizziness, tremors, seizures, syncope, facial asymmetry, speech difficulty, light-headedness, numbness and headaches.   Hematological: Negative.    Psychiatric/Behavioral: Negative.         Otherwise 10-system ROS reviewed and is negative except as mentioned in the HPI.    ---------------------------------------------------------------------------------------------------------------------   History reviewed. No pertinent past medical history.  History reviewed. No pertinent surgical history.  History reviewed. No pertinent family history.  Social History     Socioeconomic History    Marital status: Single   Tobacco Use    Smoking status: Former     Current packs/day: 0.00     Types: Cigarettes     Quit date:      Years since quittin.8     Passive exposure: Past    Smokeless tobacco: Never   Vaping Use    Vaping status: Never Used   Substance and Sexual Activity    Alcohol use: Never    Drug use: Never    Sexual activity: Defer     ---------------------------------------------------------------------------------------------------------------------   Allergies:  Patient has no known allergies.  ---------------------------------------------------------------------------------------------------------------------   Medications below are reported home medications pulling from within the system; at this time, these medications have not been reconciled unless otherwise specified and are in the verification process for further verifcation as current home medications.    Prior to Admission Medications       None          ---------------------------------------------------------------------------------------------------------------------    Objective     Hospital Scheduled Meds:  insulin lispro, 2-7 Units, Subcutaneous, 4x Daily AC & at Bedtime  sodium chloride, 10 mL, Intravenous, Q12H             Current listed hospital scheduled medications may not yet reflect those currently placed in orders that are  signed and held, awaiting patient's arrival to floor/unit.    ---------------------------------------------------------------------------------------------------------------------   Vital Signs:  Temp:  [97.5 °F (36.4 °C)-98.7 °F (37.1 °C)] 98.7 °F (37.1 °C)  Heart Rate:  [76-96] 83  Resp:  [17-20] 18  BP: (101-140)/(59-95) 101/59  Mean Arterial Pressure (Non-Invasive) for the past 24 hrs (Last 3 readings):   Noninvasive MAP (mmHg)   10/20/24 2116 100   10/20/24 2059 86   10/20/24 2044 98     SpO2 Percentage    10/20/24 2059 10/20/24 2116 10/20/24 2240   SpO2: 98% 96% 95%     SpO2:  [92 %-98 %] 95 %  on   ;   Device (Oxygen Therapy): room air    Body mass index is 28.16 kg/m².  Wt Readings from Last 3 Encounters:   10/20/24 86.5 kg (190 lb 11.2 oz)   08/26/24 90.7 kg (200 lb)   08/31/23 99.8 kg (220 lb)       ---------------------------------------------------------------------------------------------------------------------   Physical Exam  Vitals and nursing note reviewed.   Constitutional:       General: He is awake. He is not in acute distress.     Appearance: Normal appearance. He is normal weight. He is not ill-appearing or diaphoretic.   HENT:      Head: Normocephalic and atraumatic.      Nose: Nose normal.      Mouth/Throat:      Mouth: Mucous membranes are moist.      Pharynx: Oropharynx is clear.   Eyes:      Extraocular Movements: Extraocular movements intact.      Pupils: Pupils are equal, round, and reactive to light.   Cardiovascular:      Rate and Rhythm: Normal rate and regular rhythm.      Pulses: Normal pulses.           Dorsalis pedis pulses are 2+ on the right side and 2+ on the left side.      Heart sounds: Normal heart sounds. No murmur heard.     No friction rub.   Pulmonary:      Effort: Pulmonary effort is normal. No accessory muscle usage, respiratory distress or retractions.      Breath sounds: Normal breath sounds. No wheezing, rhonchi or rales.   Abdominal:      General: Bowel sounds are  normal. There is no distension.      Palpations: Abdomen is soft.      Tenderness: There is no abdominal tenderness. There is no guarding.   Musculoskeletal:         General: Normal range of motion.      Cervical back: Normal range of motion and neck supple. No rigidity.      Right lower leg: No edema.      Left lower leg: No edema.   Skin:     General: Skin is warm and dry.      Capillary Refill: Capillary refill takes 2 to 3 seconds.   Neurological:      General: No focal deficit present.      Mental Status: He is alert and oriented to person, place, and time. Mental status is at baseline.      Cranial Nerves: No dysarthria or facial asymmetry.      Sensory: Sensation is intact.      Motor: No weakness or tremor.   Psychiatric:         Attention and Perception: Attention normal.         Mood and Affect: Mood normal.         Speech: Speech normal.         Behavior: Behavior normal. Behavior is cooperative.         Thought Content: Thought content normal.         Cognition and Memory: Cognition normal.         Judgment: Judgment normal.          ---------------------------------------------------------------------------------------------------------------------  EKG: Atrial fibrillation, unconfirmed by cardiology      Telemetry:    Atrial fibrillation    I have personally reviewed the EKG/Telemetry strip  ---------------------------------------------------------------------------------------------------------------------   Results from last 7 days   Lab Units 10/20/24  1309 10/20/24  1110   HSTROP T ng/L 29* 27*     Results from last 7 days   Lab Units 10/20/24  1110   PROBNP pg/mL 5,383.0*       Results from last 7 days   Lab Units 10/20/24  1112   PH, ARTERIAL pH units 7.430   PO2 ART mm Hg 68.0*   PCO2, ARTERIAL mm Hg 38.0   HCO3 ART mmol/L 25.2     Results from last 7 days   Lab Units 10/20/24  1110   CRP mg/dL 12.93*   LACTATE mmol/L 2.0   WBC 10*3/mm3 14.19*   HEMOGLOBIN g/dL 9.0*   HEMATOCRIT % 29.9*   MCV fL  "86.9   MCHC g/dL 30.1*   PLATELETS 10*3/mm3 433   INR  1.71*     Results from last 7 days   Lab Units 10/20/24  1110   SODIUM mmol/L 124*   POTASSIUM mmol/L 4.6   CHLORIDE mmol/L 88*   CO2 mmol/L 25.5   BUN mg/dL 16   CREATININE mg/dL 0.93   CALCIUM mg/dL 10.3   GLUCOSE mg/dL 163*   ALBUMIN g/dL 3.1*   BILIRUBIN mg/dL 0.3   ALK PHOS U/L 93   AST (SGOT) U/L 24   ALT (SGPT) U/L 28   Estimated Creatinine Clearance: 65.5 mL/min (by C-G formula based on SCr of 0.93 mg/dL).  No results found for: \"AMMONIA\"    Glucose   Date/Time Value Ref Range Status   10/20/2024 2207 195 (H) 70 - 130 mg/dL Final   10/20/2024 2103 154 (H) 70 - 130 mg/dL Final   10/20/2024 2005 58 (L) 70 - 130 mg/dL Final     No results found for: \"HGBA1C\"  No results found for: \"TSH\", \"FREET4\"    Microbiology Results (last 10 days)       Procedure Component Value - Date/Time    COVID-19, FLU A/B, RSV PCR 1 HR TAT - Swab, Nasopharynx [[id number]]  (Normal) Collected: 10/20/24 1130    Lab Status: Final result Specimen: Swab from Nasopharynx Updated: 10/20/24 1220     COVID19 Not Detected     Influenza A PCR Not Detected     Influenza B PCR Not Detected     RSV, PCR Not Detected    Narrative:      Fact sheet for providers: https://www.fda.gov/media/207103/download    Fact sheet for patients: https://www.fda.gov/media/144509/download    Test performed by PCR.           Pain Management Panel          Latest Ref Rng & Units 9/1/2023   Pain Management Panel   Amphetamine, Urine Qual Negative Negative    Barbiturates Screen, Urine Negative Negative    Benzodiazepine Screen, Urine Negative Negative    Buprenorphine, Screen, Urine Negative Negative    Cocaine Screen, Urine Negative Negative    Fentanyl, Urine Negative Negative    Methadone Screen , Urine Negative Negative    Methamphetamine, Ur Negative Negative       Details                 I have personally reviewed the above laboratory results. "   ---------------------------------------------------------------------------------------------------------------------  Imaging Results (Last 7 Days)       Procedure Component Value Units Date/Time    CT Chest Without Contrast Diagnostic [[id number]] Collected: 10/20/24 1528     Updated: 10/20/24 1535    Narrative:      PROCEDURE: CT of the chest performed without IV contrast on October 20, 2024. The examination was performed with 3 mm axial imaging and 3 mm  sagittal and coronal reconstruction images. The examination was  performed according to as low as reasonably achievable dose protocol.  Total . The examination was performed according to as low as  reasonably achievable dose protocol.     HISTORY: Shortness of breath.     COMPARISON: None.     FINDINGS:     Enlarged heart size  Moderate size left pleural effusion.  Small size right pleural effusion.  Emphysematous changes in the upper lobes.  Airspace consolidation with central cavitation in the superior segment  of the left lower lobe consistent with pneumonia.  Airspace consolidation and atelectasis and scarring also noted in the  lower lingula.  Contribution from underlying mass lesion and neoplasm in the left lower  lobe is not excluded.  Focal airspace consolidation with spiculated margins in the anterior  aspect of the left upper lobe adjacent to the pleural margin and seen  best on image 34, series 3.  Markedly enlarged mediastinal lymph nodes suspicious for metastatic  disease to the mediastinum. Markedly enlarged AP window and subcarinal  and left hilar adenopathy.  Normal thyroid gland.  Sternotomy.  No pericardial effusion.  Coronary arterial vascular calcifications.  Moderate volume of dense calcified plaque along the aortic arch and  descending thoracic aortic segments.  Small cyst in the left hepatic lobe.       Impression:         1.  Heterogeneous area of consolidation in the superior segment of the  left lower lobe with central  cavitation consistent with pneumonia with  probable contribution from underlying mass lesion/neoplasm.  2.  Markedly enlarged mediastinal lymph nodes including subcarinal and  AP window lymph nodes with heterogeneous attenuation suggestive of  underlying metastatic disease and neoplastic involvement.  3.  Enlarged heart size.  4.  Small right pleural effusion.  5.  Moderate size left pleural effusion.  6.  Focal area of consolidation in the anterior aspect of the left upper  lobe with spiculated margins suspicious for additional mass lesion.  7.  Emphysema.  8.  No pneumothorax.  9.  Small left hepatic cyst.  10.  Degenerative disc disease throughout the thoracic spine with  endplate and facet hypertrophic changes.        This report was finalized on 10/20/2024 3:33 PM by Chico Modi MD.       CT Abdomen Pelvis Without Contrast [[id number]] Collected: 10/20/24 1235     Updated: 10/20/24 1242    Narrative:      PROCEDURE: CT of the abdomen and pelvis performed without IV contrast on  October 20, 2024. The examination was performed with 4 mm axial imaging  and 4 mm sagittal and coronal reconstruction images. The examination was  performed according to as low as reasonably achievable dose protocol.     HISTORY: Abdominal pain.     COMPARISON: None.     FINDINGS:     Enlarged heart size.  Small size right pleural effusion.  Moderate size left pleural effusion.  Compressive atelectasis at the lung bases, left greater than right.  Sternotomy.  No pericardial effusion.  Mild atelectasis also noted in the lower lingula.  No acute process seen in the liver, pancreas, adrenal glands, and  kidneys.  A normal appendix is well seen.  Mild constipation throughout the colon.  Mild enlarged prostate gland.  Mild osteoarthritis at the right and left hip joint.  Multilevel degenerative disc disease throughout the lumbar spine with  endplate and facet hypertrophic changes.  No free fluid or free air. No abscess or hematoma.  Small  umbilical hernia contains only fat.  Small left hepatic cyst.  Mild ectatic infrarenal abdominal aorta up to 2.69 cm in diameter.  No retroperitoneal hemorrhage.  Renal vascular calcifications on the right and left side.  1 mm calcification at the lower pole of the left kidney seen best on  image 50, series 2 is nonobstructive.       Impression:         1.  Mild enlarged heart size.  2.  Bilateral pleural effusions, left greater than right with some mild  compressive atelectasis at the lung bases.  3.  Normal appendix is well seen.  4.  Slightly ectatic infrarenal abdominal aortic segment.  5.  Small left hepatic cyst.  6.  1 mm calcification in the lower pole of the left kidney.  7.  Degenerative disc disease throughout the lumbar spine with endplate  and facet hypertrophic changes.  8.  Fluid-filled distended bladder.  9.  Mild enlarged prostate gland.  10.  Mild colon and sigmoid colon diverticulosis.  11.  Mild constipation throughout the colon.  12.  No features of enterocolitis.  13.  No diverticulitis.  14.  No free fluid or free air.  15.  No abscess or hematoma.     This report was finalized on 10/20/2024 12:40 PM by Chico Modi MD.       XR Chest AP [[id number]] Collected: 10/20/24 1222     Updated: 10/20/24 1239    Narrative:      PROCEDURE: Chest x-ray examination performed on October 20, 2024. Single  view. Upright position.     HISTORY: Shortness of breath.     COMPARISON: None.     FINDINGS:     Enlarged heart size  Sternotomy.  Coarsened bronchovascular pattern to the lungs.  Airspace consolidation in the left lower lobe and lower lingula  suggestive of underlying multifocal pneumonia.  No edema.  Small left pleural effusion.   No pneumothorax.       Impression:      Impression:     1.  Enlarged heart size.  2.  Airspace disease in the left lower lobe and lower lingula.  3.  Small left pleural effusion.  4.  Sternotomy.  5.  Coarsened bronchovascular pattern to the lungs  6.  No pneumothorax.         This report was finalized on 10/20/2024 12:24 PM by Chico Modi MD.             I have personally reviewed the above radiology results.     Last Echocardiogram:  No echocardiogram on file  ---------------------------------------------------------------------------------------------------------------------    Assessment & Plan      ACUTE HOSPITAL PROBLEMS    -Acute physical debility, on admission  -Acute on chronic anemia, on admission    CMP and CBC in the a.m.  Physical therapy consult placed  Occupational Therapy consult placed  Monitor hemoglobin level with a.m. labs  No signs of active bleeding    -F/E/N  Replace electrolytes per protocol as necessary.  Consistent carb diet.     CHRONIC MEDICAL PROBLEMS    -Diabetes  -Squamous cell lung cancer  -Basal cell carcinoma  -Macular degeneration  -History of MI  -Diabetic retinopathy    Vital signs per hospital policy  Insulin sliding scale  Blood glucose checks before meals and at bedtime  Continue home medication regimen on pharmacy reconciliation  ---------------------------------------------------  Activity: Bedrest  ---------------------------------------------------  The patient is considered to be a high risk patient due to: Past medical history.    OBSERVATION status; however, if further evaluation or treatment plans warrant, status may change.  Based upon current information, I predict patient's care encounter to be less than or equal to 2 midnights     Code Status: Full code  ---------------------------------------------------  Disposition/Discharge planning: Consult case management for discharge planning.  ---------------------------------------------------  I have discussed the patient's assessment and plan with attending physician Dakota Valle MD Carl B Gray, APRN     10/21/24  00:25 EDT    Attending Physician: Dakota Bruce MD       Electronically signed by Dakota Bruce MD at 10/21/24 0548          Emergency Department Notes         Luke Álvarez PCT at 10/20/24 2310          FACESHEET AND REFUSAL [facility] FORM FAXED TO [phone]    Electronically signed by Luke Álvarez PCT at 10/20/24 2311       David Lopez at 10/20/24 2105          Kt122ye/dl provider and rn aware     Electronically signed by David Lopez at 10/20/24 2105       Bryan Retana PCT at 10/20/24 2007          POC glucose was 58 mg/dL. RN notified      Electronically signed by Bryan Retana PCT at 10/20/24 2007       David Lopez at 10/20/24 1903          Contacted [facility]. Took info and advised would call back after 2000 due to shift change. Provider aware    Electronically signed by David Lopez at 10/20/24 1904       Mckinley Knox, APRN at 10/20/24 1056          Subjective   History of Present Illness  Patient is a 84-year-old male who presents to the emergency room today for shortness of breath, nausea, and abdominal pain.  Patient reports that he has been short of breath for several days and states he continues to get more unwell.  Patient also reports vomiting when he attempts to eat.  Patient reports that been going on for some time.  Patient reports that he lives alone and states [facility] to get help with care.  Patient denies chest pain.  Patient denies fever.  Patient does report having hard time getting up.    Shortness of Breath      Review of Systems   HENT: Negative.     Eyes: Negative.    Respiratory:  Positive for shortness of breath.    Cardiovascular: Negative.    Gastrointestinal: Negative.    Endocrine: Negative.    Genitourinary: Negative.    Musculoskeletal: Negative.    Skin: Negative.    Allergic/Immunologic: Negative.    Neurological:  Positive for weakness.   Hematological: Negative.    Psychiatric/Behavioral: Negative.         History reviewed. No pertinent past medical history.    No Known Allergies    No past surgical history on  file.    History reviewed. No pertinent family history.    Social History     Socioeconomic History    Marital status: Single           Objective   Physical Exam  Vitals and nursing note reviewed.   Constitutional:       Appearance: He is well-developed.   HENT:      Head: Normocephalic.      Right Ear: External ear normal.      Left Ear: External ear normal.   Eyes:      Conjunctiva/sclera: Conjunctivae normal.      Pupils: Pupils are equal, round, and reactive to light.   Cardiovascular:      Rate and Rhythm: Normal rate and regular rhythm.      Heart sounds: Normal heart sounds.   Pulmonary:      Effort: Pulmonary effort is normal.      Breath sounds: Wheezing present.   Abdominal:      General: Bowel sounds are normal.      Palpations: Abdomen is soft.   Musculoskeletal:         General: Normal range of motion.      Cervical back: Normal range of motion and neck supple.   Skin:     General: Skin is warm and dry.      Capillary Refill: Capillary refill takes less than 2 seconds.   Neurological:      Mental Status: He is alert and oriented to person, place, and time.   Psychiatric:         Behavior: Behavior normal.         Thought Content: Thought content normal.         Procedures          ED Course  ED Course as of 10/20/24 2022   Sun Oct 20, 2024   1232 EKG performed on 10/20/2024 at 1055 shows atrial fibrillation, rate 91, QRS 96.  QRS 96.  No STEMI.  Electronically signed by Alexis Chaudhry DO, 10/20/24, 12:32 PM EDT.   [NG]   1954 Patient continues to request to be admitted.  [facility] patient [facility] but [facility] and request to stay here in the hospital. [ROSALVA]      ED Course User Index  [ROSALVA] Mckinley Knox, APRN  [NG] Alexis Chaudhry DO                                             Medical Decision Making  MDM:    Escalation of care including admission/observation considered    - Discussions of management with other providers:  None and  Hospitalist    - Discussed/reviewed with Radiology regarding test interpretation    - Independent interpretation: None    - Additional patient history obtained from: None    - Review of external non-ED record (if available):  Prior Inpt record, Office record, Outpt record, Prior Outpt labs, PCP record, Outside ED record, Other    - Chronic conditions affecting care: See HPI and medical Hx.    - Social Determinants of health significantly affecting care:  None        Medical Decision Making Discussion:    Patient is a 84-year-old male who presents to the emergency room today for shortness of breath, nausea, and abdominal pain.  Patient reports that he has been short of breath for several days and states he continues to get more unwell.  Patient also reports vomiting when he attempts to eat.  Patient reports that been going on for some time.  Patient reports that he lives alone and states that [facility] or be admitted to get help with care.  Patient denies chest pain.  Patient denies fever.  Patient does report having hard time getting up.    Patient was admitted under care of hospitalist.       The patient has been given very strict return precautions to return to the emergency department should there be any acute change or worsening of their condition.  I have explained my findings and the patient has expressed understanding to me.  I explained that the work-up performed in the ED has been based on the specific complaint and concern, as the nature of emergency medicine is complaint driven and they understand that new symptoms may arise.  I have told them that, should there be any new symptoms, worsening or changing symptoms, a new work-up may be indicated that they are encouraged to return to the emergency department or promptly contact their primary care physician. We have employed a shared decision-making process as the discussion of their disposition.  The patient has been educated as to the  nature of the visit, the tests and work-up performed and the findings from today's visit. At this time, there does not appear to be any acute emergent process that necessitates admission to the hospital, however, the patient understands that this can change unexpectedly. At this time, the patient is stable for discharge home and agrees to follow-up with her primary care physician in the next 24 to 48 hours or earlier should they be able to obtain an appointment.    The patient was counseled regarding diagnostic results and treatment plan and patient has indicated understanding of these instructions.     Problems Addressed:  Pneumonia of left lung due to infectious organism, unspecified part of lung: complicated acute illness or injury    Amount and/or Complexity of Data Reviewed  Labs: ordered. Decision-making details documented in ED Course.  Radiology: ordered. Decision-making details documented in ED Course.  ECG/medicine tests: ordered.    Risk  Prescription drug management.  Decision regarding hospitalization.      Results for orders placed or performed during the hospital encounter of 10/20/24   ECG 12 Lead Dyspnea    Collection Time: 10/20/24 10:55 AM   Result Value Ref Range    QT Interval 402 ms    QTC Interval 494 ms   Comprehensive Metabolic Panel    Collection Time: 10/20/24 11:10 AM    Specimen: Arm, Right; Blood   Result Value Ref Range    Glucose 163 (H) 65 - 99 mg/dL    BUN 16 8 - 23 mg/dL    Creatinine 0.93 0.76 - 1.27 mg/dL    Sodium 124 (L) 136 - 145 mmol/L    Potassium 4.6 3.5 - 5.2 mmol/L    Chloride 88 (L) 98 - 107 mmol/L    CO2 25.5 22.0 - 29.0 mmol/L    Calcium 10.3 8.6 - 10.5 mg/dL    Total Protein 7.6 6.0 - 8.5 g/dL    Albumin 3.1 (L) 3.5 - 5.2 g/dL    ALT (SGPT) 28 1 - 41 U/L    AST (SGOT) 24 1 - 40 U/L    Alkaline Phosphatase 93 39 - 117 U/L    Total Bilirubin 0.3 0.0 - 1.2 mg/dL    Globulin 4.5 gm/dL    A/G Ratio 0.7 g/dL    BUN/Creatinine Ratio 17.2 7.0 - 25.0    Anion Gap 10.5 5.0 -  15.0 mmol/L    eGFR 81.5 >60.0 mL/min/1.73   Protime-INR    Collection Time: 10/20/24 11:10 AM    Specimen: Arm, Right; Blood   Result Value Ref Range    Protime 20.2 (H) 12.1 - 14.7 Seconds    INR 1.71 (H) 0.90 - 1.10   aPTT    Collection Time: 10/20/24 11:10 AM    Specimen: Arm, Right; Blood   Result Value Ref Range    PTT 53.4 (H) 26.5 - 34.5 seconds   Lactic Acid, Plasma    Collection Time: 10/20/24 11:10 AM    Specimen: Arm, Right; Blood   Result Value Ref Range    Lactate 2.0 0.5 - 2.0 mmol/L   C-reactive Protein    Collection Time: 10/20/24 11:10 AM    Specimen: Arm, Right; Blood   Result Value Ref Range    C-Reactive Protein 12.93 (H) 0.00 - 0.50 mg/dL   Procalcitonin    Collection Time: 10/20/24 11:10 AM    Specimen: Arm, Right; Blood   Result Value Ref Range    Procalcitonin 0.08 0.00 - 0.25 ng/mL   CBC Auto Differential    Collection Time: 10/20/24 11:10 AM    Specimen: Arm, Right; Blood   Result Value Ref Range    WBC 14.19 (H) 3.40 - 10.80 10*3/mm3    RBC 3.44 (L) 4.14 - 5.80 10*6/mm3    Hemoglobin 9.0 (L) 13.0 - 17.7 g/dL    Hematocrit 29.9 (L) 37.5 - 51.0 %    MCV 86.9 79.0 - 97.0 fL    MCH 26.2 (L) 26.6 - 33.0 pg    MCHC 30.1 (L) 31.5 - 35.7 g/dL    RDW 18.1 (H) 12.3 - 15.4 %    RDW-SD 58.2 (H) 37.0 - 54.0 fl    MPV 8.1 6.0 - 12.0 fL    Platelets 433 140 - 450 10*3/mm3    Neutrophil % 89.2 (H) 42.7 - 76.0 %    Lymphocyte % 3.0 (L) 19.6 - 45.3 %    Monocyte % 6.6 5.0 - 12.0 %    Eosinophil % 0.1 (L) 0.3 - 6.2 %    Basophil % 0.1 0.0 - 1.5 %    Immature Grans % 1.0 (H) 0.0 - 0.5 %    Neutrophils, Absolute 12.65 (H) 1.70 - 7.00 10*3/mm3    Lymphocytes, Absolute 0.43 (L) 0.70 - 3.10 10*3/mm3    Monocytes, Absolute 0.93 (H) 0.10 - 0.90 10*3/mm3    Eosinophils, Absolute 0.02 0.00 - 0.40 10*3/mm3    Basophils, Absolute 0.02 0.00 - 0.20 10*3/mm3    Immature Grans, Absolute 0.14 (H) 0.00 - 0.05 10*3/mm3    nRBC 0.0 0.0 - 0.2 /100 WBC   High Sensitivity Troponin T    Collection Time: 10/20/24 11:10 AM     Specimen: Arm, Right; Blood   Result Value Ref Range    HS Troponin T 27 (H) <22 ng/L   BNP    Collection Time: 10/20/24 11:10 AM    Specimen: Arm, Right; Blood   Result Value Ref Range    proBNP 5,383.0 (H) 0.0 - 1,800.0 pg/mL   Green Top (Gel)    Collection Time: 10/20/24 11:10 AM   Result Value Ref Range    Extra Tube Hold for add-ons.    Lavender Top    Collection Time: 10/20/24 11:10 AM   Result Value Ref Range    Extra Tube hold for add-on    Gold Top - SST    Collection Time: 10/20/24 11:10 AM   Result Value Ref Range    Extra Tube Hold for add-ons.    Light Blue Top    Collection Time: 10/20/24 11:10 AM   Result Value Ref Range    Extra Tube Hold for add-ons.    Blood Gas, Arterial With Co-Ox    Collection Time: 10/20/24 11:12 AM    Specimen: Arterial Blood   Result Value Ref Range    Site Left Radial     Sean's Test Positive     pH, Arterial 7.430 7.350 - 7.450 pH units    pCO2, Arterial 38.0 35.0 - 45.0 mm Hg    pO2, Arterial 68.0 (L) 83.0 - 108.0 mm Hg    HCO3, Arterial 25.2 20.0 - 26.0 mmol/L    Base Excess, Arterial 0.9 0.0 - 2.0 mmol/L    O2 Saturation, Arterial 93.8 (L) 94.0 - 99.0 %    Hemoglobin, Blood Gas 9.2 (L) 14 - 18 g/dL    Hematocrit, Blood Gas 28.2 (L) 38.0 - 51.0 %    Oxyhemoglobin 92.1 (L) 94 - 99 %    Methemoglobin 0.20 0.00 - 3.00 %    Carboxyhemoglobin 1.6 0 - 5 %    A-a DO2 34.1 0.0 - 300.0 mmHg    CO2 Content 26.4 22 - 33 mmol/L    Barometric Pressure for Blood Gas 738 mmHg    Modality Room Air     FIO2 21 %    Ventilator Mode NA     Collected by [id number]     pH, Temp Corrected      pCO2, Temperature Corrected      pO2, Temperature Corrected     COVID-19, FLU A/B, RSV PCR 1 HR TAT - Swab, Nasopharynx    Collection Time: 10/20/24 11:30 AM    Specimen: Nasopharynx; Swab   Result Value Ref Range    COVID19 Not Detected Not Detected - Ref. Range    Influenza A PCR Not Detected Not Detected    Influenza B PCR Not Detected Not Detected    RSV, PCR Not Detected Not Detected   High Sensitivity  Troponin T 2Hr    Collection Time: 10/20/24  1:09 PM    Specimen: Arm, Left; Blood   Result Value Ref Range    HS Troponin T 29 (H) <22 ng/L    Troponin T Delta 2 >=-4 - <+4 ng/L   POC Glucose Once    Collection Time: 10/20/24  8:05 PM    Specimen: Blood   Result Value Ref Range    Glucose 58 (L) 70 - 130 mg/dL     CT Chest Without Contrast Diagnostic   Final Result       1.  Heterogeneous area of consolidation in the superior segment of the   left lower lobe with central cavitation consistent with pneumonia with   probable contribution from underlying mass lesion/neoplasm.   2.  Markedly enlarged mediastinal lymph nodes including subcarinal and   AP window lymph nodes with heterogeneous attenuation suggestive of   underlying metastatic disease and neoplastic involvement.   3.  Enlarged heart size.   4.  Small right pleural effusion.   5.  Moderate size left pleural effusion.   6.  Focal area of consolidation in the anterior aspect of the left upper   lobe with spiculated margins suspicious for additional mass lesion.   7.  Emphysema.   8.  No pneumothorax.   9.  Small left hepatic cyst.   10.  Degenerative disc disease throughout the thoracic spine with   endplate and facet hypertrophic changes.           This report was finalized on 10/20/2024 3:33 PM by Chico Modi MD.          XR Chest AP   Final Result   Impression:       1.  Enlarged heart size.   2.  Airspace disease in the left lower lobe and lower lingula.   3.  Small left pleural effusion.   4.  Sternotomy.   5.  Coarsened bronchovascular pattern to the lungs   6.  No pneumothorax.           This report was finalized on 10/20/2024 12:24 PM by Chico Modi MD.          CT Abdomen Pelvis Without Contrast   Final Result       1.  Mild enlarged heart size.   2.  Bilateral pleural effusions, left greater than right with some mild   compressive atelectasis at the lung bases.   3.  Normal appendix is well seen.   4.  Slightly ectatic infrarenal abdominal  aortic segment.   5.  Small left hepatic cyst.   6.  1 mm calcification in the lower pole of the left kidney.   7.  Degenerative disc disease throughout the lumbar spine with endplate   and facet hypertrophic changes.   8.  Fluid-filled distended bladder.   9.  Mild enlarged prostate gland.   10.  Mild colon and sigmoid colon diverticulosis.   11.  Mild constipation throughout the colon.   12.  No features of enterocolitis.   13.  No diverticulitis.   14.  No free fluid or free air.   15.  No abscess or hematoma.       This report was finalized on 10/20/2024 12:40 PM by Chico Modi MD.                Final diagnoses:   Pneumonia of left lung due to infectious organism, unspecified part of lung       ED Disposition  ED Disposition       ED Disposition   Decision to Admit    Condition   --    Comment   --               No follow-up provider specified.       Medication List      No changes were made to your prescriptions during this visit.            Mckinley Knox APRN  10/20/24 2022      Electronically signed by Mckinley Knox APRN at 10/20/24 2022       Facility-Administered Medications as of 10/22/2024   Medication Dose Route Frequency Provider Last Rate Last Admin    acetylcysteine (MUCOMYST) 20 % nebulizer solution 3 mL  3 mL Nebulization BID - RT Oculam, Nurys Fritz MD        apixaban (ELIQUIS) tablet 5 mg  5 mg Oral BID Dakota Bruce MD   5 mg at 10/22/24 0835    ascorbic acid (VITAMIN C) tablet 250 mg  250 mg Oral Daily Dakota Bruce MD   250 mg at 10/22/24 0836    atorvastatin (LIPITOR) tablet 20 mg  20 mg Oral Nightly Dakota Bruce MD   20 mg at 10/21/24 2024    sennosides-docusate (PERICOLACE) 8.6-50 MG per tablet 2 tablet  2 tablet Oral BID PRN Dakota Bruce MD        And    polyethylene glycol (MIRALAX) packet 17 g  17 g Oral Daily PRN Dakota Bruce MD        And    bisacodyl (DULCOLAX) EC tablet 5 mg  5 mg Oral Daily PRN Dakota Bruce MD        And    bisacodyl  (DULCOLAX) suppository 10 mg  10 mg Rectal Daily PRN Dakota Bruce MD        [COMPLETED] cefTRIAXone (ROCEPHIN) 1,000 mg in sodium chloride 0.9 % 100 mL IVPB-VTB  1,000 mg Intravenous Once Mckinley Knox APRN   Stopped at 10/20/24 1525    [COMPLETED] dextrose (D50W) (25 g/50 mL) IV injection 25 g  25 g Intravenous Once Mckinley Knox APRN   25 g at 10/20/24 2011    dextrose (D50W) (25 g/50 mL) IV injection 25 g  25 g Intravenous Q15 Min PRN Andrade Jensen APRN        dextrose (GLUTOSE) oral gel 15 g  15 g Oral Q15 Min PRN Andrade Jensen APRN        ferrous sulfate tablet 325 mg  325 mg Oral Daily With Breakfast Dakota Bruce MD   325 mg at 10/22/24 0838    folic acid (FOLVITE) tablet 1 mg  1 mg Oral Daily Dakota Bruce MD   1 mg at 10/22/24 0835    glucagon HCl (Diagnostic) injection 1 mg  1 mg Intramuscular Q15 Min PRN Andrade Jensen APRN        Insulin Lispro (humaLOG) injection 2-7 Units  2-7 Units Subcutaneous 4x Daily AC & at Bedtime Andrade Jensen APRN   3 Units at 10/22/24 0838    ipratropium-albuterol (DUO-NEB) nebulizer solution 3 mL  3 mL Nebulization TID - RT OculamNurys MD        [COMPLETED] ketorolac (TORADOL) injection 30 mg  30 mg Intravenous Once Andrade Jensen APRN   30 mg at 10/21/24 2111    melatonin tablet 5 mg  5 mg Oral Nightly PRN Dakota Bruce MD        pantoprazole (PROTONIX) EC tablet 40 mg  40 mg Oral Q AM Dakota Bruce MD   40 mg at 10/22/24 0553    polyvinyl alcohol (LIQUIFILM) 1.4 % ophthalmic solution 1 drop  1 drop Both Eyes TID PRN Dakota Bruce MD        Psyllium (METAMUCIL MULTIHEALTH FIBER) 51.7 % packet 1 packet  1 packet Oral Daily PRN Dakota Bruce MD        sertraline (ZOLOFT) tablet 100 mg  100 mg Oral Daily Dakota Bruce MD   100 mg at 10/22/24 0837    sodium chloride 0.9 % flush 10 mL  10 mL Intravenous Q12H Dakota Bruce MD   10 mL at 10/22/24 0839    sodium chloride 0.9 % flush 10 mL  10 mL Intravenous PRN aDkota Bruce,  MD        thiamine (VITAMIN B-1) tablet 200 mg  200 mg Oral Q8H Dakota Bruce MD   200 mg at 10/22/24 0553     Orders (all)        Start     Ordered    10/22/24 1900  acetylcysteine (MUCOMYST) 20 % nebulizer solution 3 mL  2 Times Daily - RT         10/22/24 0907    10/22/24 0930  Chest Physiotherapy (PD & P)  2 Times Daily - RT       10/22/24 0907    10/22/24 0915  ipratropium-albuterol (DUO-NEB) nebulizer solution 3 mL  3 Times Daily - RT         10/22/24 0907    10/22/24 0915  Inpatient Admission  Once         10/22/24 0914    10/22/24 0907  Respiratory Culture - Sputum, Cough  Once         10/22/24 0907    10/22/24 0642  POC Glucose Once  PROCEDURE ONCE        Comments: Complete no more than 45 minutes prior to patient eating      10/22/24 0634    10/22/24 0600  Basic Metabolic Panel  Morning Draw         10/21/24 1147    10/22/24 0600  CBC & Differential  Morning Draw         10/21/24 1147    10/22/24 0600  CBC Auto Differential  PROCEDURE ONCE         10/21/24 2202    10/22/24 0406  Skin Tear Care - Minimal Drainage Q3 Days  Every Third Day        Comments: - Realign Skin Flap (if Viable) Gently Ease Flap Into Place Using a Dampened Cotton-Tipped Applicator or Gloved Finger  - Gently Cleanse Area & Pat Dry  - Apply Hydrogel & Non-Adherent Layer (Silicone Sheet, Oil Emulsion Dressing or Vaseline Gauze)  - Secure With Silicone Border Dressing (Unless Skin Fragile)  - If Skin is Fragile Secure With Roll Gauze - Do NOT Put Tape Directly on Skin  - Change Every 3 Days & As Needed    10/22/24 0405    10/22/24 0406  Follow Pressure Ulcer Prevention Measures Policy  Continuous        Comments: Implement Appropriate Pressure Ulcer Prevention Measures  - Open Order Report to View Full Instructions  Enter Wound LDA & Document Assessment  Add Wound Care Plan  Add Patient Education Per Policy    10/22/24 0405    10/22/24 0246  POC Glucose Once  PROCEDURE ONCE        Comments: Complete no more than 45 minutes prior to  patient eating      10/22/24 0239    10/22/24 0129  POC Glucose Once  PROCEDURE ONCE        Comments: Complete no more than 45 minutes prior to patient eating      10/22/24 0122    10/21/24 2311  POC Glucose Once  PROCEDURE ONCE        Comments: Complete no more than 45 minutes prior to patient eating      10/21/24 2304    10/21/24 2145  ketorolac (TORADOL) injection 30 mg  Once         10/21/24 2057    10/21/24 2100  atorvastatin (LIPITOR) tablet 20 mg  Nightly         10/21/24 0530    10/21/24 1922  POC Glucose Once  PROCEDURE ONCE        Comments: Complete no more than 45 minutes prior to patient eating      10/21/24 1915    10/21/24 1800  Dietary Nutrition Supplements Magic Cup  Daily With Lunch & Dinner       10/21/24 1718    10/21/24 1702  POC Glucose Once  PROCEDURE ONCE        Comments: Complete no more than 45 minutes prior to patient eating      10/21/24 1655    10/21/24 1447  Diet: Regular/House, Fluid Restriction (240 mL/tray); 1000 mL/day; Texture: Soft to Chew (NDD 3); Soft to Chew: Whole Meat; Fluid Consistency: Nectar Thick  Diet Effective Now         10/21/24 1446    10/21/24 1417  POC Glucose Once  PROCEDURE ONCE        Comments: Complete no more than 45 minutes prior to patient eating      10/21/24 1410    10/21/24 1149  FL Video Swallow Single Contrast  1 Time Imaging         10/21/24 1148    10/21/24 1147  Inpatient Pulmonology Consult  Once        Specialty:  Pulmonary Disease  Provider:  Boyd Del Real MD    10/21/24 1147    10/21/24 1139  POC Glucose Once  PROCEDURE ONCE        Comments: Complete no more than 45 minutes prior to patient eating      10/21/24 1131    10/21/24 1136  SLP Consult: Eval & Treat Communication Disorder  Once         10/21/24 1135    10/21/24 0900  ascorbic acid (VITAMIN C) tablet 250 mg  Daily         10/21/24 0530    10/21/24 0900  folic acid (FOLVITE) tablet 1 mg  Daily         10/21/24 0530    10/21/24 0900  apixaban (ELIQUIS) tablet 5 mg  2 Times Daily          10/21/24 0530    10/21/24 0900  sertraline (ZOLOFT) tablet 100 mg  Daily         10/21/24 0530    10/21/24 0800  Oral Care  2 Times Daily       10/20/24 2158    10/21/24 0800  ferrous sulfate tablet 325 mg  Daily With Breakfast         10/21/24 0530    10/21/24 0642  POC Glucose Once  PROCEDURE ONCE        Comments: Complete no more than 45 minutes prior to patient eating      10/21/24 0624    10/21/24 0630  pantoprazole (PROTONIX) EC tablet 40 mg  Every Early Morning         10/21/24 0530    10/21/24 0630  thiamine (VITAMIN B-1) tablet 200 mg  Every 8 Hours         10/21/24 0531    10/21/24 0615  cefTRIAXone (ROCEPHIN) 1 g in sodium chloride 0.9 % 100 mL IVPB-VTB  Every 24 Hours,   Status:  Discontinued         10/21/24 0521    10/21/24 0615  doxycycline (VIBRAMYCIN) 100 mg in sodium chloride 0.9 % 100 mL IVPB-VTB  Every 12 Hours,   Status:  Discontinued         10/21/24 0521    10/21/24 0600  Comprehensive Metabolic Panel  Morning Draw         10/21/24 0024    10/21/24 0600  CBC & Differential  Morning Draw         10/21/24 0024    10/21/24 0600  CBC Auto Differential  PROCEDURE ONCE         10/21/24 0024    10/21/24 0549  Diet: Regular/House, Fluid Restriction (240 mL/tray); 1000 mL/day; Texture: Soft to Chew (NDD 3); Soft to Chew: Whole Meat; Fluid Consistency: Thin (IDDSI 0)  Diet Effective Now,   Status:  Canceled         10/21/24 0549    10/21/24 0529  Hemoglobin A1c  Once         10/21/24 0528    10/21/24 0529  TSH  Once         10/21/24 0528    10/21/24 0529  Magnesium  Once         10/21/24 0528    10/21/24 0525  Psyllium (METAMUCIL MULTIHEALTH FIBER) 51.7 % packet 1 packet  Daily PRN         10/21/24 0530    10/21/24 0525  melatonin tablet 5 mg  Nightly PRN         10/21/24 0530    10/21/24 0525  polyvinyl alcohol (LIQUIFILM) 1.4 % ophthalmic solution 1 drop  3 Times Daily PRN         10/21/24 0530    10/21/24 0521  Sepsis Fluid Exclusion: Blood Pressure Responded To Lesser Volume; Bolus Volume Given /  Ordered (mL): 0  Once         10/21/24 0521    10/21/24 0520  Respiratory Culture - Sputum, Cough  Once         10/21/24 0519    10/21/24 0335  POC Glucose Once  PROCEDURE ONCE        Comments: Complete no more than 45 minutes prior to patient eating      10/21/24 0327    10/21/24 0116  POC Glucose Once  PROCEDURE ONCE        Comments: Complete no more than 45 minutes prior to patient eating      10/21/24 0109    10/21/24 0000  Vital Signs  Every 6 Hours         10/20/24 2158    10/21/24 0000  Intake & Output  Every 6 Hours         10/20/24 2158    10/21/24 0000  Neuro Checks  Every 6 Hours         10/20/24 2158    10/20/24 2245  Insulin Lispro (humaLOG) injection 2-7 Units  4 Times Daily Before Meals & Nightly         10/20/24 2158    10/20/24 2245  sodium chloride 0.9 % flush 10 mL  Every 12 Hours Scheduled         10/20/24 2158    10/20/24 2216  POC Glucose Once  PROCEDURE ONCE        Comments: Complete no more than 45 minutes prior to patient eating      10/20/24 2207    10/20/24 2200  POC Glucose 4x Daily Before Meals & at Bedtime  4 Times Daily Before Meals & at Bedtime      Comments: Complete no more than 45 minutes prior to patient eating      10/20/24 2158    10/20/24 2159  Weigh Patient  Once         10/20/24 2158    10/20/24 2159  Notify PCP of Patient Admission  Once         10/20/24 2158    10/20/24 2159  Insert Peripheral IV  Once         10/20/24 2158    10/20/24 2159  Saline Lock & Maintain IV Access  Continuous         10/20/24 2158    10/20/24 2159  Place Sequential Compression Device  Once         10/20/24 2158    10/20/24 2159  Maintain Sequential Compression Device  Continuous         10/20/24 2158    10/20/24 2159  Activity - Strict Bed Rest  Until Discontinued         10/20/24 2158    10/20/24 2159  Daily Weights  Daily       10/20/24 2158    10/20/24 2159  POC Glucose Once  Once        Comments: Complete no more than 45 minutes prior to patient eating      10/20/24 2158    10/20/24 2159   "Diet: Regular/House; Texture: Soft to Chew (NDD 3); Soft to Chew: Whole Meat; Fluid Consistency: Thin (IDDSI 0)  Diet Effective Now,   Status:  Canceled         10/20/24 2158    10/20/24 2159  SLP Consult: Eval & Treat Swallow Disorder  Once         10/20/24 2158    10/20/24 2159  PT Consult: Eval & Treat Functional Mobility Below Baseline, Discharge Placement Assessment  Once         10/20/24 2158    10/20/24 2159  OT Consult: Eval & Treat ADL Performance Below Baseline, Discharge Placement Assessment  Once         10/20/24 2158    10/20/24 2159  Inpatient Case Management  Consult  Once        Provider:  (Not yet assigned)    10/20/24 2158    10/20/24 2158  sodium chloride 0.9 % flush 10 mL  As Needed         10/20/24 2158    10/20/24 2158  sennosides-docusate (PERICOLACE) 8.6-50 MG per tablet 2 tablet  2 Times Daily PRN        Placed in \"And\" Linked Group    10/20/24 2158    10/20/24 2158  polyethylene glycol (MIRALAX) packet 17 g  Daily PRN        Placed in \"And\" Linked Group    10/20/24 2158    10/20/24 2158  bisacodyl (DULCOLAX) EC tablet 5 mg  Daily PRN        Placed in \"And\" Linked Group    10/20/24 2158    10/20/24 2158  bisacodyl (DULCOLAX) suppository 10 mg  Daily PRN        Placed in \"And\" Linked Group    10/20/24 2158    10/20/24 2158  dextrose (GLUTOSE) oral gel 15 g  Every 15 Minutes PRN         10/20/24 2158    10/20/24 2158  dextrose (D50W) (25 g/50 mL) IV injection 25 g  Every 15 Minutes PRN         10/20/24 2158    10/20/24 2158  glucagon HCl (Diagnostic) injection 1 mg  Every 15 Minutes PRN         10/20/24 2158    10/20/24 2143  Code Status and Medical Interventions: CPR (Attempt to Resuscitate); Full Support  Continuous         10/20/24 2144    10/20/24 2111  POC Glucose Once  PROCEDURE ONCE        Comments: Complete no more than 45 minutes prior to patient eating      10/20/24 2103    10/20/24 2056  POC Glucose Once  Once        Comments: Complete no more than 45 minutes prior to " patient eating      10/20/24 2055    10/20/24 2038  Initiate Observation Status  Once         10/20/24 2038    10/20/24 2022  dextrose (D50W) (25 g/50 mL) IV injection 25 g  Once         10/20/24 2006    10/20/24 2013  POC Glucose Once  PROCEDURE ONCE        Comments: Complete no more than 45 minutes prior to patient eating      10/20/24 2005    10/20/24 2011  Diet: Regular/House; Fluid Consistency: Thin (IDDSI 0)  Diet Effective Now,   Status:  Canceled         10/20/24 2010    10/20/24 2000  POC Glucose Once  Once        Comments: Complete no more than 45 minutes prior to patient eating      10/20/24 1959    10/20/24 1428  cefTRIAXone (ROCEPHIN) 1,000 mg in sodium chloride 0.9 % 100 mL IVPB-VTB  Once         10/20/24 1412    10/20/24 1314  CT Chest Without Contrast Diagnostic  1 Time Imaging         10/20/24 1313    10/20/24 1310  High Sensitivity Troponin T 2Hr  PROCEDURE ONCE         10/20/24 1201    10/20/24 1138  CT Abdomen Pelvis Without Contrast  1 Time Imaging         10/20/24 1137    10/20/24 1114  Blood Gas, Arterial With Co-Ox  PROCEDURE ONCE         10/20/24 1112    10/20/24 1105  High Sensitivity Troponin T  STAT         10/20/24 1104    10/20/24 1105  BNP  STAT         10/20/24 1104    10/20/24 1105  ECG 12 Lead Dyspnea  STAT         10/20/24 1104    10/20/24 1104  [place] Draw  Once         10/20/24 1104    10/20/24 1104  Green Top (Gel)  PROCEDURE ONCE         10/20/24 1104    10/20/24 1104  Lavender Top  PROCEDURE ONCE         10/20/24 1104    10/20/24 1104  Gold Top - SST  PROCEDURE ONCE         10/20/24 1104    10/20/24 1104  Light Blue Top  PROCEDURE ONCE         10/20/24 1104    10/20/24 1100  COVID-19, FLU A/B, RSV PCR 1 HR TAT - Swab, Nasopharynx  Once         10/20/24 1059    10/20/24 1058  CBC & Differential  Once         10/20/24 1059    10/20/24 1058  Comprehensive Metabolic Panel  Once         10/20/24 1059    10/20/24 1058  Protime-INR  Once         10/20/24 1059    10/20/24 1058  aPTT   "Once         10/20/24 1059    10/20/24 1058  Blood Culture - Blood, Arm, Right  Once        Placed in \"And\" Linked Group    10/20/24 1059    10/20/24 1058  Blood Culture - Blood, Arm, Right  Once        Comments: From a different site than #1.     Placed in \"And\" Linked Group    10/20/24 1059    10/20/24 1058  Lactic Acid, Plasma  STAT         10/20/24 1059    10/20/24 1058  Blood Gas, Arterial With Co-Ox  STAT         10/20/24 1059    10/20/24 1058  C-reactive Protein  STAT         10/20/24 1059    10/20/24 1058  Procalcitonin  Once         10/20/24 1059    10/20/24 1058  XR Chest AP  1 Time Imaging         10/20/24 1059    10/20/24 1058  CBC Auto Differential  PROCEDURE ONCE         10/20/24 1059    Unscheduled  Follow Hypoglycemia Standing Orders For Blood Glucose <70 & Notify Provider of Treatment  As Needed      Comments: Follow Hypoglycemia Orders As Outlined in Process Instructions (Open Order Report to View Full Instructions)  Notify Provider Any Time Hypoglycemia Treatment is Administered    10/20/24 2158    Unscheduled  Skin Tear Care - Minimal Drainage PRN  As Needed      Comments: - Realign Skin Flap (if Viable) Gently Ease Flap Into Place Using a Dampened Cotton-Tipped Applicator or Gloved Finger  - Gently Cleanse Area & Pat Dry  - Apply Hydrogel & Non-Adherent Layer (Silicone Sheet, Oil Emulsion Dressing or Vaseline Gauze)  - Secure With Silicone Border Dressing (Unless Skin Fragile)  - If Skin is Fragile Secure With Roll Gauze - Do NOT Put Tape Directly on Skin  - Change Every 3 Days & As Needed    10/22/24 0405    --  polyvinyl alcohol (LIQUIFILM) 1.4 % ophthalmic solution  3 Times Daily PRN         10/20/24 2214    --  empagliflozin (JARDIANCE) 25 MG tablet tablet  Daily         10/20/24 2214    --  metFORMIN (GLUCOPHAGE) 1000 MG tablet  2 Times Daily With Meals         10/20/24 2214    --  sildenafil (VIAGRA) 100 MG tablet  Daily PRN         10/20/24 2214    --  lisinopril (PRINIVIL,ZESTRIL) 10 MG " tablet  Daily         10/20/24 2214    --  glipizide (GLUCOTROL) 10 MG tablet  2 Times Daily Before Meals         10/20/24 2214    --  omeprazole (priLOSEC) 20 MG capsule  Daily         10/20/24 2214    --  atorvastatin (LIPITOR) 40 MG tablet  Nightly         10/20/24 2214    --  Melatonin 3 MG capsule  Nightly PRN         10/20/24 2214    --  metoprolol succinate XL (TOPROL-XL) 100 MG 24 hr tablet  Daily         10/20/24 2214    --  ascorbic acid (VITAMIN C) 250 MG tablet  Daily         10/20/24 2214    --  ferrous gluconate (FERGON) 324 MG tablet  Daily With Breakfast         10/20/24 2214    --  folic acid (FOLVITE) 1 MG tablet  Daily         10/20/24 2214    --  furosemide (LASIX) 40 MG tablet  Daily         10/20/24 2214    --  polyethylene glycol (MiraLax Mix-In [place]) 17 g packet  Daily PRN         10/20/24 2214    --  senna 8.6 MG tablet  Daily PRN         10/20/24 2214    --  psyllium (METAMUCIL) 58.6 % packet  Daily PRN         10/20/24 2214    --  apixaban (ELIQUIS) 5 MG tablet tablet  2 Times Daily         10/20/24 2214    --  sertraline (ZOLOFT) 100 MG tablet  Daily         10/20/24 2214    --  levoFLOXacin (LEVAQUIN) 500 MG tablet  Daily         10/20/24 2214                     Physician Progress Notes (all)        Nurys Campos MD at 10/21/24 0819              [facility]     Patient Identification:  Name:  Tyler Andrade  Age:  84 y.o.  Sex:  male  :  1940  MRN:  [id number]  Visit Number:  [id number]  Primary Care Provider:  Provider, No Known    Length of stay:  0    Subjective: Patient seen and examined assisted by his nurse present inside the room.  Patient sitting at the edge of the bed, reports he has been getting very weak, noted patient whispers when he talks.  Patient stated he normally is able to talk but has lost his voice since  of this year.  He denies workup or explanation for his loss of voice.  Denies coughing when he swallows.   Patient receives radiation treatment for non-small cell lung cancer left side.    Chief Complaint: Generalized weakness  ----------------------------------------------------------------------------------------------------------------------  Current Hospital Meds:  apixaban, 5 mg, Oral, BID  ascorbic acid, 250 mg, Oral, Daily  atorvastatin, 20 mg, Oral, Nightly  cefTRIAXone, 1 g, Intravenous, Q24H  doxycycline, 100 mg, Intravenous, Q12H  ferrous sulfate, 325 mg, Oral, Daily With Breakfast  folic acid, 1 mg, Oral, Daily  insulin lispro, 2-7 Units, Subcutaneous, 4x Daily AC & at Bedtime  pantoprazole, 40 mg, Oral, Q AM  sertraline, 100 mg, Oral, Daily  sodium chloride, 10 mL, Intravenous, Q12H  vitamin B-1, 200 mg, Oral, Q8H         ----------------------------------------------------------------------------------------------------------------------  Vital Signs:  Temp:  [97.5 °F (36.4 °C)-98.7 °F (37.1 °C)] 98.6 °F (37 °C)  Heart Rate:  [76-96] 86  Resp:  [17-20] 20  BP: ()/(51-95) 119/66       Tele:       10/20/24  1058 10/20/24  2240 10/21/24  0500   Weight: 90.7 kg (200 lb) 86.5 kg (190 lb 11.2 oz) (Anthony Lopes) 86.5 kg (190 lb 11.2 oz)     Body mass index is 28.16 kg/m².    Intake/Output Summary (Last 24 hours) at 10/21/2024 0820  Last data filed at 10/21/2024 0358  Gross per 24 hour   Intake 350 ml   Output 500 ml   Net -150 ml     Diet: Regular/House, Fluid Restriction (240 mL/tray); 1000 mL/day; Texture: Soft to Chew (NDD 3); Soft to Chew: Whole Meat; Fluid Consistency: Thin (IDDSI 0)  ----------------------------------------------------------------------------------------------------------------------  Physical exam:  General: Sitting at the edge of the bed very pleasant awake and alert answers appropriately.    No respiratory distress.    Skin:  Skin is warm and dry. No rash noted. No pallor.    HENT: Multiple basal lesions including the nose and face.  Head:  Normocephalic and atraumatic.  Mouth:   Moist mucous membranes.    Eyes:  Conjunctivae and EOM are normal.  Pupils are equal, round, and reactive to light.  No scleral icterus.    Neck:  Neck supple.  No JVD present.  No lymphadenopathy  Pulmonary/Chest:  No respiratory distress, no wheezes, no crackles, with normal breath sounds and good air movement.  Cardiovascular:  Normal rate, regular rhythm and normal heart sounds with no murmur.  Abdominal:  Soft.  Bowel sounds are normal.  No distension and no tenderness.   Extremities:  No edema, no tenderness, and no deformity.  No red or swollen joints anywhere.  Strong pulses in all 4 extremities with no clubbing, no cyanosis, no edema.  Neurological:  Motor strength equal no obvious deficit, sensory grossly intact.   No cranial nerve deficit.  No tongue deviation.  No facial droop.  No slurred speech.    Genitourinary: No Santana catheter  Back:  ----------------------------------------------------------------------------------------------------------------------  ----------------------------------------------------------------------------------------------------------------------  Results from last 7 days   Lab Units 10/20/24  1309 10/20/24  1110   HSTROP T ng/L 29* 27*     Results from last 7 days   Lab Units 10/21/24  0104 10/20/24  1110   CRP mg/dL  --  12.93*   LACTATE mmol/L  --  2.0   WBC 10*3/mm3 11.81* 14.19*   HEMOGLOBIN g/dL 8.0* 9.0*   HEMATOCRIT % 26.9* 29.9*   MCV fL 87.1 86.9   MCHC g/dL 29.7* 30.1*   PLATELETS 10*3/mm3 386 433   INR   --  1.71*     Results from last 7 days   Lab Units 10/20/24  1112   PH, ARTERIAL pH units 7.430   PO2 ART mm Hg 68.0*   PCO2, ARTERIAL mm Hg 38.0   HCO3 ART mmol/L 25.2     Results from last 7 days   Lab Units 10/21/24  0104 10/20/24  1110   SODIUM mmol/L 128* 124*   POTASSIUM mmol/L 4.6 4.6   MAGNESIUM mg/dL 1.8  --    CHLORIDE mmol/L 93* 88*   CO2 mmol/L 26.6 25.5   BUN mg/dL 18 16   CREATININE mg/dL 1.07 0.93   CALCIUM mg/dL 10.0 10.3   GLUCOSE mg/dL 68 163*  "  ALBUMIN g/dL 2.7* 3.1*   BILIRUBIN mg/dL 0.2 0.3   ALK PHOS U/L 82 93   AST (SGOT) U/L 22 24   ALT (SGPT) U/L 24 28   Estimated Creatinine Clearance: 57 mL/min (by C-G formula based on SCr of 1.07 mg/dL).    No results found for: \"AMMONIA\"      No results found for: \"BLOODCX\"  No results found for: \"URINECX\"  No results found for: \"WOUNDCX\"  No results found for: \"STOOLCX\"    I have personally looked at the labs and they are summarized above.  ----------------------------------------------------------------------------------------------------------------------  Imaging Results (Last 24 Hours)       Procedure Component Value Units Date/Time    CT Chest Without Contrast Diagnostic [[id number]] Collected: 10/20/24 1528     Updated: 10/20/24 1535    Narrative:      PROCEDURE: CT of the chest performed without IV contrast on October 20, 2024. The examination was performed with 3 mm axial imaging and 3 mm  sagittal and coronal reconstruction images. The examination was  performed according to as low as reasonably achievable dose protocol.  Total . The examination was performed according to as low as  reasonably achievable dose protocol.     HISTORY: Shortness of breath.     COMPARISON: None.     FINDINGS:     Enlarged heart size  Moderate size left pleural effusion.  Small size right pleural effusion.  Emphysematous changes in the upper lobes.  Airspace consolidation with central cavitation in the superior segment  of the left lower lobe consistent with pneumonia.  Airspace consolidation and atelectasis and scarring also noted in the  lower lingula.  Contribution from underlying mass lesion and neoplasm in the left lower  lobe is not excluded.  Focal airspace consolidation with spiculated margins in the anterior  aspect of the left upper lobe adjacent to the pleural margin and seen  best on image 34, series 3.  Markedly enlarged mediastinal lymph nodes suspicious for metastatic  disease to the mediastinum. " Markedly enlarged AP window and subcarinal  and left hilar adenopathy.  Normal thyroid gland.  Sternotomy.  No pericardial effusion.  Coronary arterial vascular calcifications.  Moderate volume of dense calcified plaque along the aortic arch and  descending thoracic aortic segments.  Small cyst in the left hepatic lobe.       Impression:         1.  Heterogeneous area of consolidation in the superior segment of the  left lower lobe with central cavitation consistent with pneumonia with  probable contribution from underlying mass lesion/neoplasm.  2.  Markedly enlarged mediastinal lymph nodes including subcarinal and  AP window lymph nodes with heterogeneous attenuation suggestive of  underlying metastatic disease and neoplastic involvement.  3.  Enlarged heart size.  4.  Small right pleural effusion.  5.  Moderate size left pleural effusion.  6.  Focal area of consolidation in the anterior aspect of the left upper  lobe with spiculated margins suspicious for additional mass lesion.  7.  Emphysema.  8.  No pneumothorax.  9.  Small left hepatic cyst.  10.  Degenerative disc disease throughout the thoracic spine with  endplate and facet hypertrophic changes.        This report was finalized on 10/20/2024 3:33 PM by Chico Modi MD.       CT Abdomen Pelvis Without Contrast [[id number]] Collected: 10/20/24 1235     Updated: 10/20/24 1242    Narrative:      PROCEDURE: CT of the abdomen and pelvis performed without IV contrast on  October 20, 2024. The examination was performed with 4 mm axial imaging  and 4 mm sagittal and coronal reconstruction images. The examination was  performed according to as low as reasonably achievable dose protocol.     HISTORY: Abdominal pain.     COMPARISON: None.     FINDINGS:     Enlarged heart size.  Small size right pleural effusion.  Moderate size left pleural effusion.  Compressive atelectasis at the lung bases, left greater than right.  Sternotomy.  No pericardial effusion.  Mild  atelectasis also noted in the lower lingula.  No acute process seen in the liver, pancreas, adrenal glands, and  kidneys.  A normal appendix is well seen.  Mild constipation throughout the colon.  Mild enlarged prostate gland.  Mild osteoarthritis at the right and left hip joint.  Multilevel degenerative disc disease throughout the lumbar spine with  endplate and facet hypertrophic changes.  No free fluid or free air. No abscess or hematoma.  Small umbilical hernia contains only fat.  Small left hepatic cyst.  Mild ectatic infrarenal abdominal aorta up to 2.69 cm in diameter.  No retroperitoneal hemorrhage.  Renal vascular calcifications on the right and left side.  1 mm calcification at the lower pole of the left kidney seen best on  image 50, series 2 is nonobstructive.       Impression:         1.  Mild enlarged heart size.  2.  Bilateral pleural effusions, left greater than right with some mild  compressive atelectasis at the lung bases.  3.  Normal appendix is well seen.  4.  Slightly ectatic infrarenal abdominal aortic segment.  5.  Small left hepatic cyst.  6.  1 mm calcification in the lower pole of the left kidney.  7.  Degenerative disc disease throughout the lumbar spine with endplate  and facet hypertrophic changes.  8.  Fluid-filled distended bladder.  9.  Mild enlarged prostate gland.  10.  Mild colon and sigmoid colon diverticulosis.  11.  Mild constipation throughout the colon.  12.  No features of enterocolitis.  13.  No diverticulitis.  14.  No free fluid or free air.  15.  No abscess or hematoma.     This report was finalized on 10/20/2024 12:40 PM by Chico Modi MD.       XR Chest AP [[id number]] Collected: 10/20/24 1222     Updated: 10/20/24 1239    Narrative:      PROCEDURE: Chest x-ray examination performed on October 20, 2024. Single  view. Upright position.     HISTORY: Shortness of breath.     COMPARISON: None.     FINDINGS:     Enlarged heart size  Sternotomy.  Coarsened bronchovascular  pattern to the lungs.  Airspace consolidation in the left lower lobe and lower lingula  suggestive of underlying multifocal pneumonia.  No edema.  Small left pleural effusion.   No pneumothorax.       Impression:      Impression:     1.  Enlarged heart size.  2.  Airspace disease in the left lower lobe and lower lingula.  3.  Small left pleural effusion.  4.  Sternotomy.  5.  Coarsened bronchovascular pattern to the lungs  6.  No pneumothorax.        This report was finalized on 10/20/2024 12:24 PM by Chico Modi MD.             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    Continue antibiotic, monitor sodium, H&H monitoring, speech evaluation for hoarseness of voice, will request pulmonology input, Accu-Chek and sliding scale.  PT OT.    Disposition pending      Nurys Campos MD  10/21/24  08:20 EDT    Electronically signed by Nurys Campos MD at 10/21/24 1146          Consult Notes (all)        Boyd Del Real MD at 10/21/24 1244        Consult Orders    1. Inpatient Pulmonology Consult [[id number]] ordered by Nurys Campos MD at 10/21/24 1147                   Pulmonary and critical care consultation note  Referring Provider: dr Campos  Reason for Consultation: Abnormal CT chest      Chief complaint -shortness of breath      History of present illness: 84-year-old male with  past medical history significant for lung cancer, MI and diabetes mellitus presented to ER for evaluation of shortness of breath.  Shortness of breath has been present since last 1 month and has been progressively getting worse.  HPI from the time of admission reviewed.  On my assessment patient was on room air was going to go for a speech and swallow evaluation.    All the labs medications ins and outs and vital signs at time of admission reviewed.      Review of Systems  History obtained from chart review and the patient  General ROS: negative for - chills, fatigue or fever  Psychological ROS: negative for - anxiety or depression  ENT ROS: negative for - headaches, visual changes or vocal changes  Respiratory ROS: positive for - cough and shortness of breath  Cardiovascular ROS: no chest pain or dyspnea on exertion  Gastrointestinal ROS: no abdominal pain, change in bowel habits, or black or bloody stools  Musculoskeletal ROS: negative for - joint pain, joint stiffness or joint swelling  Neurological ROS: no TIA or stroke symptoms  Hematological: no bleeding  Skin: no bruises, no rash        History  History reviewed. No pertinent past medical history., History reviewed. No pertinent surgical history., History reviewed. No pertinent family history.,   Social History     Tobacco Use    Smoking status: Former     Current packs/day: 0.00     Types: Cigarettes     Quit date:      Years since quittin.8     Passive exposure: Past    Smokeless tobacco: Never   Vaping Use    Vaping status: Never Used   Substance Use Topics    Alcohol use: Never    Drug use: Never   ,   Medications Prior to Admission   Medication Sig Dispense Refill Last Dose/Taking    apixaban (ELIQUIS) 5 MG tablet tablet Take 1 tablet by mouth 2 (Two) Times a Day.   10/20/2024 Morning    ascorbic acid (VITAMIN C) 250 MG tablet Take 1 tablet by mouth Daily.   10/20/2024 Morning    atorvastatin (LIPITOR) 40 MG tablet Take 0.5 tablets by mouth Every  Night.   10/19/2024 Bedtime    empagliflozin (JARDIANCE) 25 MG tablet tablet Take 0.5 tablets by mouth Daily.   10/20/2024 Morning    ferrous gluconate (FERGON) 324 MG tablet Take 1 tablet by mouth Daily With Breakfast.   10/20/2024 Morning    folic acid (FOLVITE) 1 MG tablet Take 1 tablet by mouth Daily.   10/20/2024 Morning    furosemide (LASIX) 40 MG tablet Take 1 tablet by mouth Daily.   10/20/2024    glipizide (GLUCOTROL) 10 MG tablet Take 1.5 tablets by mouth 2 (Two) Times a Day Before Meals.   10/20/2024 Morning    levoFLOXacin (LEVAQUIN) 500 MG tablet Take 1 tablet by mouth Daily.   10/20/2024    lisinopril (PRINIVIL,ZESTRIL) 10 MG tablet Take 0.5 tablets by mouth Daily.   10/20/2024 Morning    metFORMIN (GLUCOPHAGE) 1000 MG tablet Take 1 tablet by mouth 2 (Two) Times a Day With Meals.   10/20/2024 Morning    metoprolol succinate XL (TOPROL-XL) 100 MG 24 hr tablet Take 0.5 tablets by mouth Daily.   10/20/2024    omeprazole (priLOSEC) 20 MG capsule Take 1 capsule by mouth Daily.   10/20/2024    sertraline (ZOLOFT) 100 MG tablet Take 1 tablet by mouth Daily.   10/20/2024    Melatonin 3 MG capsule Take 2 capsules by mouth At Night As Needed (Sleep).   Unknown    polyethylene glycol (MiraLax Mix-In [place]) 17 g packet Take 17 g by mouth Daily As Needed (Constipation).   Unknown    polyvinyl alcohol (LIQUIFILM) 1.4 % ophthalmic solution Administer 1 drop to both eyes 3 (Three) Times a Day As Needed for Dry Eyes.   Unknown    psyllium (METAMUCIL) 58.6 % packet Take 1 packet by mouth Daily As Needed (Constipation).   Unknown    senna 8.6 MG tablet Take 2 tablets by mouth Daily As Needed for Constipation.   Unknown    sildenafil (VIAGRA) 100 MG tablet Take 1 tablet by mouth Daily As Needed for Erectile Dysfunction.   Unknown   , Scheduled Meds:  apixaban, 5 mg, Oral, BID  ascorbic acid, 250 mg, Oral, Daily  atorvastatin, 20 mg, Oral, Nightly  cefTRIAXone, 1 g, Intravenous, Q24H  doxycycline, 100 mg, Intravenous,  Q12H  ferrous sulfate, 325 mg, Oral, Daily With Breakfast  folic acid, 1 mg, Oral, Daily  insulin lispro, 2-7 Units, Subcutaneous, 4x Daily AC & at Bedtime  pantoprazole, 40 mg, Oral, Q AM  sertraline, 100 mg, Oral, Daily  sodium chloride, 10 mL, Intravenous, Q12H  vitamin B-1, 200 mg, Oral, Q8H    , Continuous Infusions:    and Allergies:  Patient has no known allergies.    Objective     Vital Signs   Temp:  [97.8 °F (36.6 °C)-98.7 °F (37.1 °C)] 98.6 °F (37 °C)  Heart Rate:  [77-95] 86  Resp:  [17-20] 20  BP: ()/(51-95) 119/66    Physical Exam:             General- normal in appearance, not in any acute distress    HEENT- pupils equally reactive to light, normal in size, no scleral icterus    Neck-supple    Respiratory-respirations normal-on auscultation no wheezing no crackles,     Cardiovascular-  Normal S1 and S2. No S3, S4 or murmurs. No JVD, no carotid bruit and no edema, pulses normal bilaterally     GI-nontender nondistended bowel sounds positive    CNS-nonfocal    Musculoskeletal -no edema  Extremities- no obvious deformity noticed     Psychiatric-mood good, good eye contact, alert awake oriented  Skin-some healing spots on the face.  As per the patient he had skin cancer.                                                                  Results Review:    LABS:    Lab Results   Component Value Date    GLUCOSE 68 10/21/2024    BUN 18 10/21/2024    CREATININE 1.07 10/21/2024    EGFRIFNONA 45 (L) 05/22/2021    BCR 16.8 10/21/2024    CO2 26.6 10/21/2024    CALCIUM 10.0 10/21/2024    ALBUMIN 2.7 (L) 10/21/2024    AST 22 10/21/2024    ALT 24 10/21/2024    WBC 11.81 (H) 10/21/2024    HGB 8.0 (L) 10/21/2024    HCT 26.9 (L) 10/21/2024    MCV 87.1 10/21/2024     10/21/2024     (L) 10/21/2024    K 4.6 10/21/2024    CL 93 (L) 10/21/2024    ANIONGAP 8.4 10/21/2024       Lab Results   Component Value Date    INR 1.71 (H) 10/20/2024    INR 1.16 (H) 08/26/2024    INR 1.20 (H) 08/31/2023    PROTIME 20.2  (H) 10/20/2024    PROTIME 14.9 (H) 08/26/2024    PROTIME 15.8 (H) 08/31/2023       Results from last 7 days   Lab Units 10/20/24  1110   INR  1.71*   APTT seconds 53.4*          I reviewed the patient's new clinical results.  I reviewed the patient's new imaging results and agree with the interpretation.    Microbiology Results (last 10 days)       Procedure Component Value - Date/Time    Blood Culture - Blood, Arm, Right [[id number]]  (Normal) Collected: 10/20/24 1130    Lab Status: Preliminary result Specimen: Blood from Arm, Right Updated: 10/21/24 1145     Blood Culture No growth at 24 hours    COVID-19, FLU A/B, RSV PCR 1 HR TAT - Swab, Nasopharynx [[id number]]  (Normal) Collected: 10/20/24 1130    Lab Status: Final result Specimen: Swab from Nasopharynx Updated: 10/20/24 1220     COVID19 Not Detected     Influenza A PCR Not Detected     Influenza B PCR Not Detected     RSV, PCR Not Detected    Narrative:      Fact sheet for providers: https://www.fda.gov/media/370194/download    Fact sheet for patients: https://www.fda.gov/media/234578/download    Test performed by PCR.    Blood Culture - Blood, Arm, Right [[id number]]  (Normal) Collected: 10/20/24 1110    Lab Status: Preliminary result Specimen: Blood from Arm, Right Updated: 10/21/24 1145     Blood Culture No growth at 24 hours           Assessment & Plan         Abnormal CT chest-patient already has a diagnosis of lung cancer and will be going for chemo and radiation.    Procalitonin Results:      Lab 10/20/24  1110   PROCALCITONIN 0.08      Continue current antibiotics.    PT OT to avoid deconditioning    Speech and swallow to make sure patient is not aspirating.    Follow-up with hematology oncology on the outpatient basis for the treatment of squamous cell lung cancer.    Cardiology- hemodynamically -stable.  Continue current treatment  Continue to monitor HR- rate and rhythm, BP     Nephrology- Cr and BUN stable  I/O-ins and outs reviewed    GI-continue  current diet.    Hematology- CBC  Hb-transfuse for less than 7  platelet  WBC    ID-procalcitonin being normal.  Microbiology negative.  Can consider discontinuing antibiotics.    Endrocrinology-diabetes mellitus-maintain Blood sugar 140 -180    Electrolytes-   Mag and phos       DVT prophylaxis-continue        Family member present-none                Inability to walk          Boyd Del Real MD  10/21/24  12:44 EDT         Electronically signed by Boyd Del Real MD at 10/21/24 [id number]

## 2024-10-22 NOTE — PROGRESS NOTES
Pharmacokinetics Service Note:    Tyler Andrade is a 84 y.o. male being evaluated by Pharmacy for vancomycin dosing.    Indication for Therapy: PNA  Currently Estimated Renal Function: ClCr 46 mL/min using serum Cr of 1.33 mg/dL  Proposed Empiric Regimen Using Predicted Pharmacokinetic Parameters and Demographics: loading dose of 1750 mg then 1000 mg IV q18h  Duration of Therapy Ordered: 7 days  Target Steady State AUC Range: 400-600 mg/L*hr  PAUC: >80%    Monitoring Planned: Will obtain a trough before the 3rd dose.     Thanks for this consult,  Donna Harper, GeorgesD

## 2024-10-22 NOTE — PAYOR COMM NOTE
"CONTACT: AYAAN NAIK RN  UTILIZATION MANAGEMENT DEPT.  [facility]  [address] [place] [address]  PHONE: [phone]  FAX: [phone]       REVISED FAX WITH REF# [id number]       INPATIENT AUTH REQUEST    PLACED IN OBS ON 10/20/24, CONVERTED TO INPT ON 10/22/24        REF# [id number]       Albin Andrade (84 y.o. Male)       Date of Birth   1940    Social Security Number       Address   [id number]    Home Phone   [phone]    MRN   [id number]       Jewish   None    Marital Status   Single                            Admission Date   10/22/24    Admission Type   Emergency    Admitting Provider   Dakota Bruce MD    Attending Provider   Nurys Campos MD    Department, Room/Bed   [address] 2[address]/       Discharge Date       Discharge Disposition       Discharge Destination                                 Attending Provider: Nurys Campos MD    Allergies: No Known Allergies    Isolation: None   Infection: None   Code Status: CPR    Ht: 175.3 cm (69\")   Wt: 85.7 kg (189 lb)    Admission Cmt: None   Principal Problem: Inability to walk [R26.2]                   Active Insurance as of 10/20/2024       Primary Coverage       Payor Plan Insurance Group Employer/Plan Group    [facility] OPTUM        Payor Plan Address Payor Plan Phone Number Payor Plan Fax Number Effective Dates    [address] [phone]  12/1/2022 - None Entered    [facility] [id number]         Subscriber Name Subscriber Birth Date Member ID       ALBIN ANDRADE 1940 [id number]               Secondary Coverage       Payor Plan Insurance Group Employer/Plan Group    [facility] DEPT [id number]        Payor Plan Address Payor Plan Phone Number Payor Plan Fax Number Effective Dates    [facility] OFFICE OF COMMUNITY CARE [phone]  5/21/2021 - None Entered    [address] [address]       [place] [address]         Subscriber Name Subscriber Birth " Date Member ID       ALBIN ANDRADE 1940 [id number]               Tertiary Coverage       Payor Plan Insurance Group Employer/Plan Group    [employer] MEDICARE REPLACEMENT [employer] MED ADV PPO [id number]       Payor Plan Address Payor Plan Phone Number Payor Plan Fax Number Effective Dates    [address] [address] [phone]  2023 - None Entered    [facility] [address]         Subscriber Name Subscriber Birth Date Member ID       ALBIN ANDRADE 1940 [id number]                     Emergency Contacts        (Rel.) Home Phone Work Phone Mobile Phone    PERRY ANDRADE (Daughter) [phone] -- --                 History & Physical        Andrade Jensen APRN at 10/20/24 2037       Attestation signed by Dakota Bruce MD at 10/21/24 2507    I have reviewed this documentation and agree.  I have discussed and formulated the assessment and plan with PHIL Jones.  I have also discussed the patient with the ED attending and reviewed the patient's past medical history, labs, vital signs, imaging, and EKGs.    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  Will order PT, OT, speech, and case management/.  Will trend the sodium levels and place him on a 1000 mL fluid restriction.                     [facility]  HISTORY & PHYSICAL    Patient Identification:  Name:  Albin Andrade  Age:  84 y.o.  Sex:  male  :  1940  MRN:  [id number]   Visit Number:  [id number]  Admit Date: 10/20/2024   Primary Care Physician:  Provider, No Known     Subjective     Chief complaint:   Chief Complaint   Patient presents with    Shortness of Breath     History of presenting illness:   Patient is a 84  y.o. male with past medical history significant for diabetes, MI, and lung cancer that presented to the [facility] emergency department for evaluation of shortness of breath.  Patient states that he has had some generalized weakness for the past month.  He advises that 2 days ago he became weaker and unable to ambulate on his own.  The patient denies any chest pain or abdominal pain.  The patient states that he is struggling completing daily living activities due to his weakness.  On my examination, patient is alert and oriented x 3.  No acute distress noted.  Patient does have ROM, however patient is struggling with overall strength of movement.  The patient is in stable condition on admission to the hospital. Upon arrival to the ED, vitals were temperature 97.5, /82, heart rate 90, respirations 19, SpO2 95%. Labs obtained on arrival: pO2 68, initial troponin 27, reflex troponin 29.  proBNP 5383, sodium 124, chloride 88, glucose 163, CRP 12.93, PT 20.2, INR 1.71, PTT 53.4, WBC 14.19, hemoglobin 9.0, and hematocrit 29.9. Patient has been admitted to the medical/surgical unit for further evaluation and treatment.     Present during exam: RN  ---------------------------------------------------------------------------------------------------------------------   Review of Systems   Constitutional:  Positive for fatigue. Negative for chills and fever.   HENT: Negative.  Negative for congestion, sore throat and trouble swallowing.    Eyes: Negative.    Respiratory: Negative.  Negative for cough, shortness of breath and wheezing.    Cardiovascular: Negative.  Negative for chest pain, palpitations and leg swelling.   Gastrointestinal: Negative.  Negative for abdominal pain, diarrhea, nausea and vomiting.   Endocrine: Negative.    Genitourinary: Negative.  Negative for dysuria.   Musculoskeletal: Negative.  Negative for neck pain and neck stiffness.   Skin: Negative.    Allergic/Immunologic: Negative.     Neurological:  Positive for weakness. Negative for dizziness, tremors, seizures, syncope, facial asymmetry, speech difficulty, light-headedness, numbness and headaches.   Hematological: Negative.    Psychiatric/Behavioral: Negative.         Otherwise 10-system ROS reviewed and is negative except as mentioned in the HPI.    ---------------------------------------------------------------------------------------------------------------------   History reviewed. No pertinent past medical history.  History reviewed. No pertinent surgical history.  History reviewed. No pertinent family history.  Social History     Socioeconomic History    Marital status: Single   Tobacco Use    Smoking status: Former     Current packs/day: 0.00     Types: Cigarettes     Quit date:      Years since quittin.8     Passive exposure: Past    Smokeless tobacco: Never   Vaping Use    Vaping status: Never Used   Substance and Sexual Activity    Alcohol use: Never    Drug use: Never    Sexual activity: Defer     ---------------------------------------------------------------------------------------------------------------------   Allergies:  Patient has no known allergies.  ---------------------------------------------------------------------------------------------------------------------   Medications below are reported home medications pulling from within the system; at this time, these medications have not been reconciled unless otherwise specified and are in the verification process for further verifcation as current home medications.    Prior to Admission Medications       None          ---------------------------------------------------------------------------------------------------------------------    Objective     Hospital Scheduled Meds:  insulin lispro, 2-7 Units, Subcutaneous, 4x Daily AC & at Bedtime  sodium chloride, 10 mL, Intravenous, Q12H             Current listed hospital scheduled medications may not yet reflect  those currently placed in orders that are signed and held, awaiting patient's arrival to floor/unit.    ---------------------------------------------------------------------------------------------------------------------   Vital Signs:  Temp:  [97.5 °F (36.4 °C)-98.7 °F (37.1 °C)] 98.7 °F (37.1 °C)  Heart Rate:  [76-96] 83  Resp:  [17-20] 18  BP: (101-140)/(59-95) 101/59  Mean Arterial Pressure (Non-Invasive) for the past 24 hrs (Last 3 readings):   Noninvasive MAP (mmHg)   10/20/24 2116 100   10/20/24 2059 86   10/20/24 2044 98     SpO2 Percentage    10/20/24 2059 10/20/24 2116 10/20/24 2240   SpO2: 98% 96% 95%     SpO2:  [92 %-98 %] 95 %  on   ;   Device (Oxygen Therapy): room air    Body mass index is 28.16 kg/m².  Wt Readings from Last 3 Encounters:   10/20/24 86.5 kg (190 lb 11.2 oz)   08/26/24 90.7 kg (200 lb)   08/31/23 99.8 kg (220 lb)       ---------------------------------------------------------------------------------------------------------------------   Physical Exam  Vitals and nursing note reviewed.   Constitutional:       General: He is awake. He is not in acute distress.     Appearance: Normal appearance. He is normal weight. He is not ill-appearing or diaphoretic.   HENT:      Head: Normocephalic and atraumatic.      Nose: Nose normal.      Mouth/Throat:      Mouth: Mucous membranes are moist.      Pharynx: Oropharynx is clear.   Eyes:      Extraocular Movements: Extraocular movements intact.      Pupils: Pupils are equal, round, and reactive to light.   Cardiovascular:      Rate and Rhythm: Normal rate and regular rhythm.      Pulses: Normal pulses.           Dorsalis pedis pulses are 2+ on the right side and 2+ on the left side.      Heart sounds: Normal heart sounds. No murmur heard.     No friction rub.   Pulmonary:      Effort: Pulmonary effort is normal. No accessory muscle usage, respiratory distress or retractions.      Breath sounds: Normal breath sounds. No wheezing, rhonchi or rales.    Abdominal:      General: Bowel sounds are normal. There is no distension.      Palpations: Abdomen is soft.      Tenderness: There is no abdominal tenderness. There is no guarding.   Musculoskeletal:         General: Normal range of motion.      Cervical back: Normal range of motion and neck supple. No rigidity.      Right lower leg: No edema.      Left lower leg: No edema.   Skin:     General: Skin is warm and dry.      Capillary Refill: Capillary refill takes 2 to 3 seconds.   Neurological:      General: No focal deficit present.      Mental Status: He is alert and oriented to person, place, and time. Mental status is at baseline.      Cranial Nerves: No dysarthria or facial asymmetry.      Sensory: Sensation is intact.      Motor: No weakness or tremor.   Psychiatric:         Attention and Perception: Attention normal.         Mood and Affect: Mood normal.         Speech: Speech normal.         Behavior: Behavior normal. Behavior is cooperative.         Thought Content: Thought content normal.         Cognition and Memory: Cognition normal.         Judgment: Judgment normal.          ---------------------------------------------------------------------------------------------------------------------  EKG: Atrial fibrillation, unconfirmed by cardiology      Telemetry:    Atrial fibrillation    I have personally reviewed the EKG/Telemetry strip  ---------------------------------------------------------------------------------------------------------------------   Results from last 7 days   Lab Units 10/20/24  1309 10/20/24  1110   HSTROP T ng/L 29* 27*     Results from last 7 days   Lab Units 10/20/24  1110   PROBNP pg/mL 5,383.0*       Results from last 7 days   Lab Units 10/20/24  1112   PH, ARTERIAL pH units 7.430   PO2 ART mm Hg 68.0*   PCO2, ARTERIAL mm Hg 38.0   HCO3 ART mmol/L 25.2     Results from last 7 days   Lab Units 10/20/24  1110   CRP mg/dL 12.93*   LACTATE mmol/L 2.0   WBC 10*3/mm3 14.19*  "  HEMOGLOBIN g/dL 9.0*   HEMATOCRIT % 29.9*   MCV fL 86.9   MCHC g/dL 30.1*   PLATELETS 10*3/mm3 433   INR  1.71*     Results from last 7 days   Lab Units 10/20/24  1110   SODIUM mmol/L 124*   POTASSIUM mmol/L 4.6   CHLORIDE mmol/L 88*   CO2 mmol/L 25.5   BUN mg/dL 16   CREATININE mg/dL 0.93   CALCIUM mg/dL 10.3   GLUCOSE mg/dL 163*   ALBUMIN g/dL 3.1*   BILIRUBIN mg/dL 0.3   ALK PHOS U/L 93   AST (SGOT) U/L 24   ALT (SGPT) U/L 28   Estimated Creatinine Clearance: 65.5 mL/min (by C-G formula based on SCr of 0.93 mg/dL).  No results found for: \"AMMONIA\"    Glucose   Date/Time Value Ref Range Status   10/20/2024 2207 195 (H) 70 - 130 mg/dL Final   10/20/2024 2103 154 (H) 70 - 130 mg/dL Final   10/20/2024 2005 58 (L) 70 - 130 mg/dL Final     No results found for: \"HGBA1C\"  No results found for: \"TSH\", \"FREET4\"    Microbiology Results (last 10 days)       Procedure Component Value - Date/Time    COVID-19, FLU A/B, RSV PCR 1 HR TAT - Swab, Nasopharynx [[id number]]  (Normal) Collected: 10/20/24 1130    Lab Status: Final result Specimen: Swab from Nasopharynx Updated: 10/20/24 1220     COVID19 Not Detected     Influenza A PCR Not Detected     Influenza B PCR Not Detected     RSV, PCR Not Detected    Narrative:      Fact sheet for providers: https://www.fda.gov/media/278049/download    Fact sheet for patients: https://www.fda.gov/media/190136/download    Test performed by PCR.           Pain Management Panel          Latest Ref Rng & Units 9/1/2023   Pain Management Panel   Amphetamine, Urine Qual Negative Negative    Barbiturates Screen, Urine Negative Negative    Benzodiazepine Screen, Urine Negative Negative    Buprenorphine, Screen, Urine Negative Negative    Cocaine Screen, Urine Negative Negative    Fentanyl, Urine Negative Negative    Methadone Screen , Urine Negative Negative    Methamphetamine, Ur Negative Negative       Details                 I have personally reviewed the above laboratory results. "   ---------------------------------------------------------------------------------------------------------------------  Imaging Results (Last 7 Days)       Procedure Component Value Units Date/Time    CT Chest Without Contrast Diagnostic [[id number]] Collected: 10/20/24 1528     Updated: 10/20/24 1535    Narrative:      PROCEDURE: CT of the chest performed without IV contrast on October 20, 2024. The examination was performed with 3 mm axial imaging and 3 mm  sagittal and coronal reconstruction images. The examination was  performed according to as low as reasonably achievable dose protocol.  Total . The examination was performed according to as low as  reasonably achievable dose protocol.     HISTORY: Shortness of breath.     COMPARISON: None.     FINDINGS:     Enlarged heart size  Moderate size left pleural effusion.  Small size right pleural effusion.  Emphysematous changes in the upper lobes.  Airspace consolidation with central cavitation in the superior segment  of the left lower lobe consistent with pneumonia.  Airspace consolidation and atelectasis and scarring also noted in the  lower lingula.  Contribution from underlying mass lesion and neoplasm in the left lower  lobe is not excluded.  Focal airspace consolidation with spiculated margins in the anterior  aspect of the left upper lobe adjacent to the pleural margin and seen  best on image 34, series 3.  Markedly enlarged mediastinal lymph nodes suspicious for metastatic  disease to the mediastinum. Markedly enlarged AP window and subcarinal  and left hilar adenopathy.  Normal thyroid gland.  Sternotomy.  No pericardial effusion.  Coronary arterial vascular calcifications.  Moderate volume of dense calcified plaque along the aortic arch and  descending thoracic aortic segments.  Small cyst in the left hepatic lobe.       Impression:         1.  Heterogeneous area of consolidation in the superior segment of the  left lower lobe with central  cavitation consistent with pneumonia with  probable contribution from underlying mass lesion/neoplasm.  2.  Markedly enlarged mediastinal lymph nodes including subcarinal and  AP window lymph nodes with heterogeneous attenuation suggestive of  underlying metastatic disease and neoplastic involvement.  3.  Enlarged heart size.  4.  Small right pleural effusion.  5.  Moderate size left pleural effusion.  6.  Focal area of consolidation in the anterior aspect of the left upper  lobe with spiculated margins suspicious for additional mass lesion.  7.  Emphysema.  8.  No pneumothorax.  9.  Small left hepatic cyst.  10.  Degenerative disc disease throughout the thoracic spine with  endplate and facet hypertrophic changes.        This report was finalized on 10/20/2024 3:33 PM by Chico Modi MD.       CT Abdomen Pelvis Without Contrast [[id number]] Collected: 10/20/24 1235     Updated: 10/20/24 1242    Narrative:      PROCEDURE: CT of the abdomen and pelvis performed without IV contrast on  October 20, 2024. The examination was performed with 4 mm axial imaging  and 4 mm sagittal and coronal reconstruction images. The examination was  performed according to as low as reasonably achievable dose protocol.     HISTORY: Abdominal pain.     COMPARISON: None.     FINDINGS:     Enlarged heart size.  Small size right pleural effusion.  Moderate size left pleural effusion.  Compressive atelectasis at the lung bases, left greater than right.  Sternotomy.  No pericardial effusion.  Mild atelectasis also noted in the lower lingula.  No acute process seen in the liver, pancreas, adrenal glands, and  kidneys.  A normal appendix is well seen.  Mild constipation throughout the colon.  Mild enlarged prostate gland.  Mild osteoarthritis at the right and left hip joint.  Multilevel degenerative disc disease throughout the lumbar spine with  endplate and facet hypertrophic changes.  No free fluid or free air. No abscess or hematoma.  Small  umbilical hernia contains only fat.  Small left hepatic cyst.  Mild ectatic infrarenal abdominal aorta up to 2.69 cm in diameter.  No retroperitoneal hemorrhage.  Renal vascular calcifications on the right and left side.  1 mm calcification at the lower pole of the left kidney seen best on  image 50, series 2 is nonobstructive.       Impression:         1.  Mild enlarged heart size.  2.  Bilateral pleural effusions, left greater than right with some mild  compressive atelectasis at the lung bases.  3.  Normal appendix is well seen.  4.  Slightly ectatic infrarenal abdominal aortic segment.  5.  Small left hepatic cyst.  6.  1 mm calcification in the lower pole of the left kidney.  7.  Degenerative disc disease throughout the lumbar spine with endplate  and facet hypertrophic changes.  8.  Fluid-filled distended bladder.  9.  Mild enlarged prostate gland.  10.  Mild colon and sigmoid colon diverticulosis.  11.  Mild constipation throughout the colon.  12.  No features of enterocolitis.  13.  No diverticulitis.  14.  No free fluid or free air.  15.  No abscess or hematoma.     This report was finalized on 10/20/2024 12:40 PM by Chico Modi MD.       XR Chest AP [[id number]] Collected: 10/20/24 1222     Updated: 10/20/24 1239    Narrative:      PROCEDURE: Chest x-ray examination performed on October 20, 2024. Single  view. Upright position.     HISTORY: Shortness of breath.     COMPARISON: None.     FINDINGS:     Enlarged heart size  Sternotomy.  Coarsened bronchovascular pattern to the lungs.  Airspace consolidation in the left lower lobe and lower lingula  suggestive of underlying multifocal pneumonia.  No edema.  Small left pleural effusion.   No pneumothorax.       Impression:      Impression:     1.  Enlarged heart size.  2.  Airspace disease in the left lower lobe and lower lingula.  3.  Small left pleural effusion.  4.  Sternotomy.  5.  Coarsened bronchovascular pattern to the lungs  6.  No pneumothorax.         This report was finalized on 10/20/2024 12:24 PM by Chico Modi MD.             I have personally reviewed the above radiology results.     Last Echocardiogram:  No echocardiogram on file  ---------------------------------------------------------------------------------------------------------------------    Assessment & Plan      ACUTE HOSPITAL PROBLEMS    -Acute physical debility, on admission  -Acute on chronic anemia, on admission    CMP and CBC in the a.m.  Physical therapy consult placed  Occupational Therapy consult placed  Monitor hemoglobin level with a.m. labs  No signs of active bleeding    -F/E/N  Replace electrolytes per protocol as necessary.  Consistent carb diet.     CHRONIC MEDICAL PROBLEMS    -Diabetes  -Squamous cell lung cancer  -Basal cell carcinoma  -Macular degeneration  -History of MI  -Diabetic retinopathy    Vital signs per hospital policy  Insulin sliding scale  Blood glucose checks before meals and at bedtime  Continue home medication regimen on pharmacy reconciliation  ---------------------------------------------------  Activity: Bedrest  ---------------------------------------------------  The patient is considered to be a high risk patient due to: Past medical history.    OBSERVATION status; however, if further evaluation or treatment plans warrant, status may change.  Based upon current information, I predict patient's care encounter to be less than or equal to 2 midnights     Code Status: Full code  ---------------------------------------------------  Disposition/Discharge planning: Consult case management for discharge planning.  ---------------------------------------------------  I have discussed the patient's assessment and plan with attending physician Dakota Valle MD Carl B Gray, APRN     10/21/24  00:25 EDT    Attending Physician: Dakota Bruce MD       Electronically signed by Dakota Bruce MD at 10/21/24 0548          Emergency Department Notes         Luke Álvarez PCT at 10/20/24 2310          FACESHEET AND REFUSAL [facility] FORM FAXED TO [phone]    Electronically signed by Luke Álvarez PCT at 10/20/24 2311       David Lopez at 10/20/24 2105          [id number]/dl provider and rn aware     Electronically signed by David Lopez at 10/20/24 2105       Bryan Retana PCT at 10/20/24 2007          POC glucose was 58 mg/dL. RN notified      Electronically signed by Bryan Retana PCT at 10/20/24 2007       David Lopez at 10/20/24 1903          Contacted [facility]. Took info and advised would call back after 2000 due to shift change. Provider aware    Electronically signed by David Lopez at 10/20/24 1904       Mckinley Knox, APRN at 10/20/24 1056          Subjective   History of Present Illness  Patient is a 84-year-old male who presents to the emergency room today for shortness of breath, nausea, and abdominal pain.  Patient reports that he has been short of breath for several days and states he continues to get more unwell.  Patient also reports vomiting when he attempts to eat.  Patient reports that been going on for some time.  Patient reports that he lives alone and states [facility] to get help with care.  Patient denies chest pain.  Patient denies fever.  Patient does report having hard time getting up.    Shortness of Breath      Review of Systems   HENT: Negative.     Eyes: Negative.    Respiratory:  Positive for shortness of breath.    Cardiovascular: Negative.    Gastrointestinal: Negative.    Endocrine: Negative.    Genitourinary: Negative.    Musculoskeletal: Negative.    Skin: Negative.    Allergic/Immunologic: Negative.    Neurological:  Positive for weakness.   Hematological: Negative.    Psychiatric/Behavioral: Negative.         History reviewed. No pertinent past medical history.    No Known Allergies    No past surgical history on  file.    History reviewed. No pertinent family history.    Social History     Socioeconomic History    Marital status: Single           Objective   Physical Exam  Vitals and nursing note reviewed.   Constitutional:       Appearance: He is well-developed.   HENT:      Head: Normocephalic.      Right Ear: External ear normal.      Left Ear: External ear normal.   Eyes:      Conjunctiva/sclera: Conjunctivae normal.      Pupils: Pupils are equal, round, and reactive to light.   Cardiovascular:      Rate and Rhythm: Normal rate and regular rhythm.      Heart sounds: Normal heart sounds.   Pulmonary:      Effort: Pulmonary effort is normal.      Breath sounds: Wheezing present.   Abdominal:      General: Bowel sounds are normal.      Palpations: Abdomen is soft.   Musculoskeletal:         General: Normal range of motion.      Cervical back: Normal range of motion and neck supple.   Skin:     General: Skin is warm and dry.      Capillary Refill: Capillary refill takes less than 2 seconds.   Neurological:      Mental Status: He is alert and oriented to person, place, and time.   Psychiatric:         Behavior: Behavior normal.         Thought Content: Thought content normal.         Procedures          ED Course  ED Course as of 10/20/24 2022   Sun Oct 20, 2024   1232 EKG performed on 10/20/2024 at 1055 shows atrial fibrillation, rate 91, QRS 96.  QRS 96.  No STEMI.  Electronically signed by Alexis Chaudhry DO, 10/20/24, 12:32 PM EDT.   [NG]   1954 Patient continues to request to be admitted.  [facility] patient [facility] but [facility] and request to stay here in the hospital. [ROSALVA]      ED Course User Index  [ROSALVA] Mckinley Knox, APRN  [NG] Alexis Chaudhry DO                                             Medical Decision Making  MDM:    Escalation of care including admission/observation considered    - Discussions of management with other providers:  None and  Hospitalist    - Discussed/reviewed with Radiology regarding test interpretation    - Independent interpretation: None    - Additional patient history obtained from: None    - Review of external non-ED record (if available):  Prior Inpt record, Office record, Outpt record, Prior Outpt labs, PCP record, Outside ED record, Other    - Chronic conditions affecting care: See HPI and medical Hx.    - Social Determinants of health significantly affecting care:  None        Medical Decision Making Discussion:    Patient is a 84-year-old male who presents to the emergency room today for shortness of breath, nausea, and abdominal pain.  Patient reports that he has been short of breath for several days and states he continues to get more unwell.  Patient also reports vomiting when he attempts to eat.  Patient reports that been going on for some time.  Patient reports that he lives alone and states that [facility] or be admitted to get help with care.  Patient denies chest pain.  Patient denies fever.  Patient does report having hard time getting up.    Patient was admitted under care of hospitalist.       The patient has been given very strict return precautions to return to the emergency department should there be any acute change or worsening of their condition.  I have explained my findings and the patient has expressed understanding to me.  I explained that the work-up performed in the ED has been based on the specific complaint and concern, as the nature of emergency medicine is complaint driven and they understand that new symptoms may arise.  I have told them that, should there be any new symptoms, worsening or changing symptoms, a new work-up may be indicated that they are encouraged to return to the emergency department or promptly contact their primary care physician. We have employed a shared decision-making process as the discussion of their disposition.  The patient has been educated as to the  nature of the visit, the tests and work-up performed and the findings from today's visit. At this time, there does not appear to be any acute emergent process that necessitates admission to the hospital, however, the patient understands that this can change unexpectedly. At this time, the patient is stable for discharge home and agrees to follow-up with her primary care physician in the next 24 to 48 hours or earlier should they be able to obtain an appointment.    The patient was counseled regarding diagnostic results and treatment plan and patient has indicated understanding of these instructions.     Problems Addressed:  Pneumonia of left lung due to infectious organism, unspecified part of lung: complicated acute illness or injury    Amount and/or Complexity of Data Reviewed  Labs: ordered. Decision-making details documented in ED Course.  Radiology: ordered. Decision-making details documented in ED Course.  ECG/medicine tests: ordered.    Risk  Prescription drug management.  Decision regarding hospitalization.      Results for orders placed or performed during the hospital encounter of 10/20/24   ECG 12 Lead Dyspnea    Collection Time: 10/20/24 10:55 AM   Result Value Ref Range    QT Interval 402 ms    QTC Interval 494 ms   Comprehensive Metabolic Panel    Collection Time: 10/20/24 11:10 AM    Specimen: Arm, Right; Blood   Result Value Ref Range    Glucose 163 (H) 65 - 99 mg/dL    BUN 16 8 - 23 mg/dL    Creatinine 0.93 0.76 - 1.27 mg/dL    Sodium 124 (L) 136 - 145 mmol/L    Potassium 4.6 3.5 - 5.2 mmol/L    Chloride 88 (L) 98 - 107 mmol/L    CO2 25.5 22.0 - 29.0 mmol/L    Calcium 10.3 8.6 - 10.5 mg/dL    Total Protein 7.6 6.0 - 8.5 g/dL    Albumin 3.1 (L) 3.5 - 5.2 g/dL    ALT (SGPT) 28 1 - 41 U/L    AST (SGOT) 24 1 - 40 U/L    Alkaline Phosphatase 93 39 - 117 U/L    Total Bilirubin 0.3 0.0 - 1.2 mg/dL    Globulin 4.5 gm/dL    A/G Ratio 0.7 g/dL    BUN/Creatinine Ratio 17.2 7.0 - 25.0    Anion Gap 10.5 5.0 -  15.0 mmol/L    eGFR 81.5 >60.0 mL/min/1.73   Protime-INR    Collection Time: 10/20/24 11:10 AM    Specimen: Arm, Right; Blood   Result Value Ref Range    Protime 20.2 (H) 12.1 - 14.7 Seconds    INR 1.71 (H) 0.90 - 1.10   aPTT    Collection Time: 10/20/24 11:10 AM    Specimen: Arm, Right; Blood   Result Value Ref Range    PTT 53.4 (H) 26.5 - 34.5 seconds   Lactic Acid, Plasma    Collection Time: 10/20/24 11:10 AM    Specimen: Arm, Right; Blood   Result Value Ref Range    Lactate 2.0 0.5 - 2.0 mmol/L   C-reactive Protein    Collection Time: 10/20/24 11:10 AM    Specimen: Arm, Right; Blood   Result Value Ref Range    C-Reactive Protein 12.93 (H) 0.00 - 0.50 mg/dL   Procalcitonin    Collection Time: 10/20/24 11:10 AM    Specimen: Arm, Right; Blood   Result Value Ref Range    Procalcitonin 0.08 0.00 - 0.25 ng/mL   CBC Auto Differential    Collection Time: 10/20/24 11:10 AM    Specimen: Arm, Right; Blood   Result Value Ref Range    WBC 14.19 (H) 3.40 - 10.80 10*3/mm3    RBC 3.44 (L) 4.14 - 5.80 10*6/mm3    Hemoglobin 9.0 (L) 13.0 - 17.7 g/dL    Hematocrit 29.9 (L) 37.5 - 51.0 %    MCV 86.9 79.0 - 97.0 fL    MCH 26.2 (L) 26.6 - 33.0 pg    MCHC 30.1 (L) 31.5 - 35.7 g/dL    RDW 18.1 (H) 12.3 - 15.4 %    RDW-SD 58.2 (H) 37.0 - 54.0 fl    MPV 8.1 6.0 - 12.0 fL    Platelets 433 140 - 450 10*3/mm3    Neutrophil % 89.2 (H) 42.7 - 76.0 %    Lymphocyte % 3.0 (L) 19.6 - 45.3 %    Monocyte % 6.6 5.0 - 12.0 %    Eosinophil % 0.1 (L) 0.3 - 6.2 %    Basophil % 0.1 0.0 - 1.5 %    Immature Grans % 1.0 (H) 0.0 - 0.5 %    Neutrophils, Absolute 12.65 (H) 1.70 - 7.00 10*3/mm3    Lymphocytes, Absolute 0.43 (L) 0.70 - 3.10 10*3/mm3    Monocytes, Absolute 0.93 (H) 0.10 - 0.90 10*3/mm3    Eosinophils, Absolute 0.02 0.00 - 0.40 10*3/mm3    Basophils, Absolute 0.02 0.00 - 0.20 10*3/mm3    Immature Grans, Absolute 0.14 (H) 0.00 - 0.05 10*3/mm3    nRBC 0.0 0.0 - 0.2 /100 WBC   High Sensitivity Troponin T    Collection Time: 10/20/24 11:10 AM     Specimen: Arm, Right; Blood   Result Value Ref Range    HS Troponin T 27 (H) <22 ng/L   BNP    Collection Time: 10/20/24 11:10 AM    Specimen: Arm, Right; Blood   Result Value Ref Range    proBNP 5,383.0 (H) 0.0 - 1,800.0 pg/mL   Green Top (Gel)    Collection Time: 10/20/24 11:10 AM   Result Value Ref Range    Extra Tube Hold for add-ons.    Lavender Top    Collection Time: 10/20/24 11:10 AM   Result Value Ref Range    Extra Tube hold for add-on    Gold Top - SST    Collection Time: 10/20/24 11:10 AM   Result Value Ref Range    Extra Tube Hold for add-ons.    Light Blue Top    Collection Time: 10/20/24 11:10 AM   Result Value Ref Range    Extra Tube Hold for add-ons.    Blood Gas, Arterial With Co-Ox    Collection Time: 10/20/24 11:12 AM    Specimen: Arterial Blood   Result Value Ref Range    Site Left Radial     Sean's Test Positive     pH, Arterial 7.430 7.350 - 7.450 pH units    pCO2, Arterial 38.0 35.0 - 45.0 mm Hg    pO2, Arterial 68.0 (L) 83.0 - 108.0 mm Hg    HCO3, Arterial 25.2 20.0 - 26.0 mmol/L    Base Excess, Arterial 0.9 0.0 - 2.0 mmol/L    O2 Saturation, Arterial 93.8 (L) 94.0 - 99.0 %    Hemoglobin, Blood Gas 9.2 (L) 14 - 18 g/dL    Hematocrit, Blood Gas 28.2 (L) 38.0 - 51.0 %    Oxyhemoglobin 92.1 (L) 94 - 99 %    Methemoglobin 0.20 0.00 - 3.00 %    Carboxyhemoglobin 1.6 0 - 5 %    A-a DO2 34.1 0.0 - 300.0 mmHg    CO2 Content 26.4 22 - 33 mmol/L    Barometric Pressure for Blood Gas 738 mmHg    Modality Room Air     FIO2 21 %    Ventilator Mode NA     Collected by [id number]     pH, Temp Corrected      pCO2, Temperature Corrected      pO2, Temperature Corrected     COVID-19, FLU A/B, RSV PCR 1 HR TAT - Swab, Nasopharynx    Collection Time: 10/20/24 11:30 AM    Specimen: Nasopharynx; Swab   Result Value Ref Range    COVID19 Not Detected Not Detected - Ref. Range    Influenza A PCR Not Detected Not Detected    Influenza B PCR Not Detected Not Detected    RSV, PCR Not Detected Not Detected   High Sensitivity  Troponin T 2Hr    Collection Time: 10/20/24  1:09 PM    Specimen: Arm, Left; Blood   Result Value Ref Range    HS Troponin T 29 (H) <22 ng/L    Troponin T Delta 2 >=-4 - <+4 ng/L   POC Glucose Once    Collection Time: 10/20/24  8:05 PM    Specimen: Blood   Result Value Ref Range    Glucose 58 (L) 70 - 130 mg/dL     CT Chest Without Contrast Diagnostic   Final Result       1.  Heterogeneous area of consolidation in the superior segment of the   left lower lobe with central cavitation consistent with pneumonia with   probable contribution from underlying mass lesion/neoplasm.   2.  Markedly enlarged mediastinal lymph nodes including subcarinal and   AP window lymph nodes with heterogeneous attenuation suggestive of   underlying metastatic disease and neoplastic involvement.   3.  Enlarged heart size.   4.  Small right pleural effusion.   5.  Moderate size left pleural effusion.   6.  Focal area of consolidation in the anterior aspect of the left upper   lobe with spiculated margins suspicious for additional mass lesion.   7.  Emphysema.   8.  No pneumothorax.   9.  Small left hepatic cyst.   10.  Degenerative disc disease throughout the thoracic spine with   endplate and facet hypertrophic changes.           This report was finalized on 10/20/2024 3:33 PM by Chico Modi MD.          XR Chest AP   Final Result   Impression:       1.  Enlarged heart size.   2.  Airspace disease in the left lower lobe and lower lingula.   3.  Small left pleural effusion.   4.  Sternotomy.   5.  Coarsened bronchovascular pattern to the lungs   6.  No pneumothorax.           This report was finalized on 10/20/2024 12:24 PM by Chico Modi MD.          CT Abdomen Pelvis Without Contrast   Final Result       1.  Mild enlarged heart size.   2.  Bilateral pleural effusions, left greater than right with some mild   compressive atelectasis at the lung bases.   3.  Normal appendix is well seen.   4.  Slightly ectatic infrarenal abdominal  aortic segment.   5.  Small left hepatic cyst.   6.  1 mm calcification in the lower pole of the left kidney.   7.  Degenerative disc disease throughout the lumbar spine with endplate   and facet hypertrophic changes.   8.  Fluid-filled distended bladder.   9.  Mild enlarged prostate gland.   10.  Mild colon and sigmoid colon diverticulosis.   11.  Mild constipation throughout the colon.   12.  No features of enterocolitis.   13.  No diverticulitis.   14.  No free fluid or free air.   15.  No abscess or hematoma.       This report was finalized on 10/20/2024 12:40 PM by Chico Modi MD.                Final diagnoses:   Pneumonia of left lung due to infectious organism, unspecified part of lung       ED Disposition  ED Disposition       ED Disposition   Decision to Admit    Condition   --    Comment   --               No follow-up provider specified.       Medication List      No changes were made to your prescriptions during this visit.            Mckinley Knox APRN  10/20/24 2022      Electronically signed by Mckinley Knox APRN at 10/20/24 2022       Facility-Administered Medications as of 10/22/2024   Medication Dose Route Frequency Provider Last Rate Last Admin    acetylcysteine (MUCOMYST) 20 % nebulizer solution 3 mL  3 mL Nebulization BID - RT Oculam, Nurys Frtiz MD        apixaban (ELIQUIS) tablet 5 mg  5 mg Oral BID Dakota Bruce MD   5 mg at 10/22/24 0835    ascorbic acid (VITAMIN C) tablet 250 mg  250 mg Oral Daily Dakota Bruce MD   250 mg at 10/22/24 0836    atorvastatin (LIPITOR) tablet 20 mg  20 mg Oral Nightly Dakota Bruce MD   20 mg at 10/21/24 2024    sennosides-docusate (PERICOLACE) 8.6-50 MG per tablet 2 tablet  2 tablet Oral BID PRN Dakota Bruce MD        And    polyethylene glycol (MIRALAX) packet 17 g  17 g Oral Daily PRN Dakota Bruce MD        And    bisacodyl (DULCOLAX) EC tablet 5 mg  5 mg Oral Daily PRN Dakota Bruce MD        And    bisacodyl  (DULCOLAX) suppository 10 mg  10 mg Rectal Daily PRN Dakota Bruce MD        [COMPLETED] cefTRIAXone (ROCEPHIN) 1,000 mg in sodium chloride 0.9 % 100 mL IVPB-VTB  1,000 mg Intravenous Once Mckinley Knox APRN   Stopped at 10/20/24 1525    [COMPLETED] dextrose (D50W) (25 g/50 mL) IV injection 25 g  25 g Intravenous Once Mckinley Knox APRN   25 g at 10/20/24 2011    dextrose (D50W) (25 g/50 mL) IV injection 25 g  25 g Intravenous Q15 Min PRN Andrade Jensen APRN        dextrose (GLUTOSE) oral gel 15 g  15 g Oral Q15 Min PRN Andrade Jensen APRN        ferrous sulfate tablet 325 mg  325 mg Oral Daily With Breakfast Dakota Bruce MD   325 mg at 10/22/24 0838    folic acid (FOLVITE) tablet 1 mg  1 mg Oral Daily Dakota Bruce MD   1 mg at 10/22/24 0835    glucagon HCl (Diagnostic) injection 1 mg  1 mg Intramuscular Q15 Min PRN Andrade Jensen APRN        Insulin Lispro (humaLOG) injection 2-7 Units  2-7 Units Subcutaneous 4x Daily AC & at Bedtime Andrade Jensen APRN   3 Units at 10/22/24 0838    ipratropium-albuterol (DUO-NEB) nebulizer solution 3 mL  3 mL Nebulization TID - RT Nurys Campos MD        [COMPLETED] ketorolac (TORADOL) injection 30 mg  30 mg Intravenous Once Andrade Jensen APRN   30 mg at 10/21/24 2111    melatonin tablet 5 mg  5 mg Oral Nightly PRN Dakota Bruce MD        pantoprazole (PROTONIX) EC tablet 40 mg  40 mg Oral Q AM Dakota Bruce MD   40 mg at 10/22/24 0553    Pharmacy Consult - Pharmacy to dose   Does not apply Continuous Nurys Campos MD        polyvinyl alcohol (LIQUIFILM) 1.4 % ophthalmic solution 1 drop  1 drop Both Eyes TID PRN Dakota Bruce MD        Psyllium (METAMUCIL MULTIHEALTH FIBER) 51.7 % packet 1 packet  1 packet Oral Daily PRN Dakota Bruce MD        sertraline (ZOLOFT) tablet 100 mg  100 mg Oral Daily Dakota Bruce MD   100 mg at 10/22/24 0837    sodium chloride 0.9 % flush 10 mL  10 mL Intravenous Q12H Dakota Bruce MD   10 mL  at 10/22/24 0839    sodium chloride 0.9 % flush 10 mL  10 mL Intravenous PRN Dakota Bruce MD        thiamine (VITAMIN B-1) tablet 200 mg  200 mg Oral Q8H Dakota Bruce MD   200 mg at 10/22/24 0553     Orders (all)        Start     Ordered    10/22/24 1900  acetylcysteine (MUCOMYST) 20 % nebulizer solution 3 mL  2 Times Daily - RT         10/22/24 0907    10/22/24 1145  Pharmacy Consult - Pharmacy to dose  Continuous         10/22/24 1047    10/22/24 1049  MRSA Screen, PCR (Inpatient) - Swab, Nares  Once         10/22/24 1048    10/22/24 1046  POC Glucose Once  PROCEDURE ONCE        Comments: Complete no more than 45 minutes prior to patient eating      10/22/24 1039    10/22/24 0930  Chest Physiotherapy (PD & P)  2 Times Daily - RT       10/22/24 0907    10/22/24 0915  ipratropium-albuterol (DUO-NEB) nebulizer solution 3 mL  3 Times Daily - RT         10/22/24 0907    10/22/24 0915  Inpatient Admission  Once         10/22/24 0914    10/22/24 0907  Respiratory Culture - Sputum, Cough  Once         10/22/24 0907    10/22/24 0642  POC Glucose Once  PROCEDURE ONCE        Comments: Complete no more than 45 minutes prior to patient eating      10/22/24 0634    10/22/24 0600  Basic Metabolic Panel  Morning Draw         10/21/24 1147    10/22/24 0600  CBC & Differential  Morning Draw         10/21/24 1147    10/22/24 0600  CBC Auto Differential  PROCEDURE ONCE         10/21/24 2202    10/22/24 0406  Skin Tear Care - Minimal Drainage Q3 Days  Every Third Day        Comments: - Realign Skin Flap (if Viable) Gently Ease Flap Into Place Using a Dampened Cotton-Tipped Applicator or Gloved Finger  - Gently Cleanse Area & Pat Dry  - Apply Hydrogel & Non-Adherent Layer (Silicone Sheet, Oil Emulsion Dressing or Vaseline Gauze)  - Secure With Silicone Border Dressing (Unless Skin Fragile)  - If Skin is Fragile Secure With Roll Gauze - Do NOT Put Tape Directly on Skin  - Change Every 3 Days & As Needed    10/22/24 4072     10/22/24 0406  Follow Pressure Ulcer Prevention Measures Policy  Continuous        Comments: Implement Appropriate Pressure Ulcer Prevention Measures  - Open Order Report to View Full Instructions  Enter Wound LDA & Document Assessment  Add Wound Care Plan  Add Patient Education Per Policy    10/22/24 0405    10/22/24 0246  POC Glucose Once  PROCEDURE ONCE        Comments: Complete no more than 45 minutes prior to patient eating      10/22/24 0239    10/22/24 0129  POC Glucose Once  PROCEDURE ONCE        Comments: Complete no more than 45 minutes prior to patient eating      10/22/24 0122    10/21/24 2311  POC Glucose Once  PROCEDURE ONCE        Comments: Complete no more than 45 minutes prior to patient eating      10/21/24 2304    10/21/24 2145  ketorolac (TORADOL) injection 30 mg  Once         10/21/24 2057    10/21/24 2100  atorvastatin (LIPITOR) tablet 20 mg  Nightly         10/21/24 0530    10/21/24 1922  POC Glucose Once  PROCEDURE ONCE        Comments: Complete no more than 45 minutes prior to patient eating      10/21/24 1915    10/21/24 1800  Dietary Nutrition Supplements Magic Cup  Daily With Lunch & Dinner       10/21/24 1718    10/21/24 1702  POC Glucose Once  PROCEDURE ONCE        Comments: Complete no more than 45 minutes prior to patient eating      10/21/24 1655    10/21/24 1447  Diet: Regular/House, Fluid Restriction (240 mL/tray); 1000 mL/day; Texture: Soft to Chew (NDD 3); Soft to Chew: Whole Meat; Fluid Consistency: Nectar Thick  Diet Effective Now         10/21/24 1446    10/21/24 1417  POC Glucose Once  PROCEDURE ONCE        Comments: Complete no more than 45 minutes prior to patient eating      10/21/24 1410    10/21/24 1149  FL Video Swallow Single Contrast  1 Time Imaging         10/21/24 1148    10/21/24 1147  Inpatient Pulmonology Consult  Once        Specialty:  Pulmonary Disease  Provider:  Boyd Del Real MD    10/21/24 1147    10/21/24 1139  POC Glucose Once  PROCEDURE ONCE         Comments: Complete no more than 45 minutes prior to patient eating      10/21/24 1131    10/21/24 1136  SLP Consult: Eval & Treat Communication Disorder  Once         10/21/24 1135    10/21/24 0900  ascorbic acid (VITAMIN C) tablet 250 mg  Daily         10/21/24 0530    10/21/24 0900  folic acid (FOLVITE) tablet 1 mg  Daily         10/21/24 0530    10/21/24 0900  apixaban (ELIQUIS) tablet 5 mg  2 Times Daily         10/21/24 0530    10/21/24 0900  sertraline (ZOLOFT) tablet 100 mg  Daily         10/21/24 0530    10/21/24 0800  Oral Care  2 Times Daily       10/20/24 2158    10/21/24 0800  ferrous sulfate tablet 325 mg  Daily With Breakfast         10/21/24 0530    10/21/24 0642  POC Glucose Once  PROCEDURE ONCE        Comments: Complete no more than 45 minutes prior to patient eating      10/21/24 0624    10/21/24 0630  pantoprazole (PROTONIX) EC tablet 40 mg  Every Early Morning         10/21/24 0530    10/21/24 0630  thiamine (VITAMIN B-1) tablet 200 mg  Every 8 Hours         10/21/24 0531    10/21/24 0615  cefTRIAXone (ROCEPHIN) 1 g in sodium chloride 0.9 % 100 mL IVPB-VTB  Every 24 Hours,   Status:  Discontinued         10/21/24 0521    10/21/24 0615  doxycycline (VIBRAMYCIN) 100 mg in sodium chloride 0.9 % 100 mL IVPB-VTB  Every 12 Hours,   Status:  Discontinued         10/21/24 0521    10/21/24 0600  Comprehensive Metabolic Panel  Morning Draw         10/21/24 0024    10/21/24 0600  CBC & Differential  Morning Draw         10/21/24 0024    10/21/24 0600  CBC Auto Differential  PROCEDURE ONCE         10/21/24 0024    10/21/24 0549  Diet: Regular/House, Fluid Restriction (240 mL/tray); 1000 mL/day; Texture: Soft to Chew (NDD 3); Soft to Chew: Whole Meat; Fluid Consistency: Thin (IDDSI 0)  Diet Effective Now,   Status:  Canceled         10/21/24 0549    10/21/24 0529  Hemoglobin A1c  Once         10/21/24 0528    10/21/24 0529  TSH  Once         10/21/24 0528    10/21/24 0529  Magnesium  Once         10/21/24  0528    10/21/24 0525  Psyllium (METAMUCIL MULTIHEALTH FIBER) 51.7 % packet 1 packet  Daily PRN         10/21/24 0530    10/21/24 0525  melatonin tablet 5 mg  Nightly PRN         10/21/24 0530    10/21/24 0525  polyvinyl alcohol (LIQUIFILM) 1.4 % ophthalmic solution 1 drop  3 Times Daily PRN         10/21/24 0530    10/21/24 0521  Sepsis Fluid Exclusion: Blood Pressure Responded To Lesser Volume; Bolus Volume Given / Ordered (mL): 0  Once         10/21/24 0521    10/21/24 0520  Respiratory Culture - Sputum, Cough  Once         10/21/24 0519    10/21/24 0335  POC Glucose Once  PROCEDURE ONCE        Comments: Complete no more than 45 minutes prior to patient eating      10/21/24 0327    10/21/24 0116  POC Glucose Once  PROCEDURE ONCE        Comments: Complete no more than 45 minutes prior to patient eating      10/21/24 0109    10/21/24 0000  Vital Signs  Every 6 Hours         10/20/24 2158    10/21/24 0000  Intake & Output  Every 6 Hours         10/20/24 2158    10/21/24 0000  Neuro Checks  Every 6 Hours         10/20/24 2158    10/20/24 2245  Insulin Lispro (humaLOG) injection 2-7 Units  4 Times Daily Before Meals & Nightly         10/20/24 2158    10/20/24 2245  sodium chloride 0.9 % flush 10 mL  Every 12 Hours Scheduled         10/20/24 2158    10/20/24 2216  POC Glucose Once  PROCEDURE ONCE        Comments: Complete no more than 45 minutes prior to patient eating      10/20/24 2207    10/20/24 2200  POC Glucose 4x Daily Before Meals & at Bedtime  4 Times Daily Before Meals & at Bedtime      Comments: Complete no more than 45 minutes prior to patient eating      10/20/24 2158    10/20/24 2159  Weigh Patient  Once         10/20/24 2158    10/20/24 2159  Notify PCP of Patient Admission  Once         10/20/24 2158    10/20/24 2159  Insert Peripheral IV  Once         10/20/24 2158    10/20/24 2159  Saline Lock & Maintain IV Access  Continuous         10/20/24 2158    10/20/24 2159  Place Sequential Compression Device   "Once         10/20/24 2158    10/20/24 2159  Maintain Sequential Compression Device  Continuous         10/20/24 2158    10/20/24 2159  Activity - Strict Bed Rest  Until Discontinued         10/20/24 2158    10/20/24 2159  Daily Weights  Daily       10/20/24 2158    10/20/24 2159  POC Glucose Once  Once        Comments: Complete no more than 45 minutes prior to patient eating      10/20/24 2158    10/20/24 2159  Diet: Regular/House; Texture: Soft to Chew (NDD 3); Soft to Chew: Whole Meat; Fluid Consistency: Thin (IDDSI 0)  Diet Effective Now,   Status:  Canceled         10/20/24 2158    10/20/24 2159  SLP Consult: Eval & Treat Swallow Disorder  Once         10/20/24 2158    10/20/24 2159  PT Consult: Eval & Treat Functional Mobility Below Baseline, Discharge Placement Assessment  Once         10/20/24 2158    10/20/24 2159  OT Consult: Eval & Treat ADL Performance Below Baseline, Discharge Placement Assessment  Once         10/20/24 2158    10/20/24 2159  Inpatient Case Management  Consult  Once        Provider:  (Not yet assigned)    10/20/24 2158    10/20/24 2158  sodium chloride 0.9 % flush 10 mL  As Needed         10/20/24 2158    10/20/24 2158  sennosides-docusate (PERICOLACE) 8.6-50 MG per tablet 2 tablet  2 Times Daily PRN        Placed in \"And\" Linked Group    10/20/24 2158    10/20/24 2158  polyethylene glycol (MIRALAX) packet 17 g  Daily PRN        Placed in \"And\" Linked Group    10/20/24 2158    10/20/24 2158  bisacodyl (DULCOLAX) EC tablet 5 mg  Daily PRN        Placed in \"And\" Linked Group    10/20/24 2158    10/20/24 2158  bisacodyl (DULCOLAX) suppository 10 mg  Daily PRN        Placed in \"And\" Linked Group    10/20/24 2158    10/20/24 2158  dextrose (GLUTOSE) oral gel 15 g  Every 15 Minutes PRN         10/20/24 2158    10/20/24 2158  dextrose (D50W) (25 g/50 mL) IV injection 25 g  Every 15 Minutes PRN         10/20/24 2158    10/20/24 2158  glucagon HCl (Diagnostic) injection 1 mg  " Every 15 Minutes PRN         10/20/24 2158    10/20/24 2143  Code Status and Medical Interventions: CPR (Attempt to Resuscitate); Full Support  Continuous         10/20/24 2144    10/20/24 2111  POC Glucose Once  PROCEDURE ONCE        Comments: Complete no more than 45 minutes prior to patient eating      10/20/24 2103    10/20/24 2056  POC Glucose Once  Once        Comments: Complete no more than 45 minutes prior to patient eating      10/20/24 2055    10/20/24 2038  Initiate Observation Status  Once         10/20/24 2038    10/20/24 2022  dextrose (D50W) (25 g/50 mL) IV injection 25 g  Once         10/20/24 2006    10/20/24 2013  POC Glucose Once  PROCEDURE ONCE        Comments: Complete no more than 45 minutes prior to patient eating      10/20/24 2005    10/20/24 2011  Diet: Regular/House; Fluid Consistency: Thin (IDDSI 0)  Diet Effective Now,   Status:  Canceled         10/20/24 2010    10/20/24 2000  POC Glucose Once  Once        Comments: Complete no more than 45 minutes prior to patient eating      10/20/24 1959    10/20/24 1428  cefTRIAXone (ROCEPHIN) 1,000 mg in sodium chloride 0.9 % 100 mL IVPB-VTB  Once         10/20/24 1412    10/20/24 1314  CT Chest Without Contrast Diagnostic  1 Time Imaging         10/20/24 1313    10/20/24 1310  High Sensitivity Troponin T 2Hr  PROCEDURE ONCE         10/20/24 1201    10/20/24 1138  CT Abdomen Pelvis Without Contrast  1 Time Imaging         10/20/24 1137    10/20/24 1114  Blood Gas, Arterial With Co-Ox  PROCEDURE ONCE         10/20/24 1112    10/20/24 1105  High Sensitivity Troponin T  STAT         10/20/24 1104    10/20/24 1105  BNP  STAT         10/20/24 1104    10/20/24 1105  ECG 12 Lead Dyspnea  STAT         10/20/24 1104    10/20/24 1104  [place] Draw  Once         10/20/24 1104    10/20/24 1104  Green Top (Gel)  PROCEDURE ONCE         10/20/24 1104    10/20/24 1104  Lavender Top  PROCEDURE ONCE         10/20/24 1104    10/20/24 1104  Gold Top - SST  PROCEDURE  "ONCE         10/20/24 1104    10/20/24 1104  Light Blue Top  PROCEDURE ONCE         10/20/24 1104    10/20/24 1100  COVID-19, FLU A/B, RSV PCR 1 HR TAT - Swab, Nasopharynx  Once         10/20/24 1059    10/20/24 1058  CBC & Differential  Once         10/20/24 1059    10/20/24 1058  Comprehensive Metabolic Panel  Once         10/20/24 1059    10/20/24 1058  Protime-INR  Once         10/20/24 1059    10/20/24 1058  aPTT  Once         10/20/24 1059    10/20/24 1058  Blood Culture - Blood, Arm, Right  Once        Placed in \"And\" Linked Group    10/20/24 1059    10/20/24 1058  Blood Culture - Blood, Arm, Right  Once        Comments: From a different site than #1.     Placed in \"And\" Linked Group    10/20/24 1059    10/20/24 1058  Lactic Acid, Plasma  STAT         10/20/24 1059    10/20/24 1058  Blood Gas, Arterial With Co-Ox  STAT         10/20/24 1059    10/20/24 1058  C-reactive Protein  STAT         10/20/24 1059    10/20/24 1058  Procalcitonin  Once         10/20/24 1059    10/20/24 1058  XR Chest AP  1 Time Imaging         10/20/24 1059    10/20/24 1058  CBC Auto Differential  PROCEDURE ONCE         10/20/24 1059    Unscheduled  Follow Hypoglycemia Standing Orders For Blood Glucose <70 & Notify Provider of Treatment  As Needed      Comments: Follow Hypoglycemia Orders As Outlined in Process Instructions (Open Order Report to View Full Instructions)  Notify Provider Any Time Hypoglycemia Treatment is Administered    10/20/24 2158    Unscheduled  Skin Tear Care - Minimal Drainage PRN  As Needed      Comments: - Realign Skin Flap (if Viable) Gently Ease Flap Into Place Using a Dampened Cotton-Tipped Applicator or Gloved Finger  - Gently Cleanse Area & Pat Dry  - Apply Hydrogel & Non-Adherent Layer (Silicone Sheet, Oil Emulsion Dressing or Vaseline Gauze)  - Secure With Silicone Border Dressing (Unless Skin Fragile)  - If Skin is Fragile Secure With Roll Gauze - Do NOT Put Tape Directly on Skin  - Change Every 3 Days & " As Needed    10/22/24 0405    --  polyvinyl alcohol (LIQUIFILM) 1.4 % ophthalmic solution  3 Times Daily PRN         10/20/24 2214    --  empagliflozin (JARDIANCE) 25 MG tablet tablet  Daily         10/20/24 2214    --  metFORMIN (GLUCOPHAGE) 1000 MG tablet  2 Times Daily With Meals         10/20/24 2214    --  sildenafil (VIAGRA) 100 MG tablet  Daily PRN         10/20/24 2214    --  lisinopril (PRINIVIL,ZESTRIL) 10 MG tablet  Daily         10/20/24 2214    --  glipizide (GLUCOTROL) 10 MG tablet  2 Times Daily Before Meals         10/20/24 2214    --  omeprazole (priLOSEC) 20 MG capsule  Daily         10/20/24 2214    --  atorvastatin (LIPITOR) 40 MG tablet  Nightly         10/20/24 2214    --  Melatonin 3 MG capsule  Nightly PRN         10/20/24 2214    --  metoprolol succinate XL (TOPROL-XL) 100 MG 24 hr tablet  Daily         10/20/24 2214    --  ascorbic acid (VITAMIN C) 250 MG tablet  Daily         10/20/24 2214    --  ferrous gluconate (FERGON) 324 MG tablet  Daily With Breakfast         10/20/24 2214    --  folic acid (FOLVITE) 1 MG tablet  Daily         10/20/24 2214    --  furosemide (LASIX) 40 MG tablet  Daily         10/20/24 2214    --  polyethylene glycol (MiraLax Mix-In [place]) 17 g packet  Daily PRN         10/20/24 2214    --  senna 8.6 MG tablet  Daily PRN         10/20/24 2214    --  psyllium (METAMUCIL) 58.6 % packet  Daily PRN         10/20/24 2214    --  apixaban (ELIQUIS) 5 MG tablet tablet  2 Times Daily         10/20/24 2214    --  sertraline (ZOLOFT) 100 MG tablet  Daily         10/20/24 2214    --  levoFLOXacin (LEVAQUIN) 500 MG tablet  Daily         10/20/24 2214                     Physician Progress Notes (all)        Nurys Campos MD at 10/21/24 0819              [facility]     Patient Identification:  Name:  Tyler Andrade  Age:  84 y.o.  Sex:  male  :  1940  MRN:  [id number]  Visit Number:  [id number]  Primary Care Provider:  Provider,  No Known    Length of stay:  0    Subjective: Patient seen and examined assisted by his nurse present inside the room.  Patient sitting at the edge of the bed, reports he has been getting very weak, noted patient whispers when he talks.  Patient stated he normally is able to talk but has lost his voice since June of this year.  He denies workup or explanation for his loss of voice.  Denies coughing when he swallows.  Patient receives radiation treatment for non-small cell lung cancer left side.    Chief Complaint: Generalized weakness  ----------------------------------------------------------------------------------------------------------------------  Current Hospital Meds:  apixaban, 5 mg, Oral, BID  ascorbic acid, 250 mg, Oral, Daily  atorvastatin, 20 mg, Oral, Nightly  cefTRIAXone, 1 g, Intravenous, Q24H  doxycycline, 100 mg, Intravenous, Q12H  ferrous sulfate, 325 mg, Oral, Daily With Breakfast  folic acid, 1 mg, Oral, Daily  insulin lispro, 2-7 Units, Subcutaneous, 4x Daily AC & at Bedtime  pantoprazole, 40 mg, Oral, Q AM  sertraline, 100 mg, Oral, Daily  sodium chloride, 10 mL, Intravenous, Q12H  vitamin B-1, 200 mg, Oral, Q8H         ----------------------------------------------------------------------------------------------------------------------  Vital Signs:  Temp:  [97.5 °F (36.4 °C)-98.7 °F (37.1 °C)] 98.6 °F (37 °C)  Heart Rate:  [76-96] 86  Resp:  [17-20] 20  BP: ()/(51-95) 119/66       Tele:       10/20/24  1058 10/20/24  2240 10/21/24  0500   Weight: 90.7 kg (200 lb) 86.5 kg (190 lb 11.2 oz) (Anthony Lopes) 86.5 kg (190 lb 11.2 oz)     Body mass index is 28.16 kg/m².    Intake/Output Summary (Last 24 hours) at 10/21/2024 0820  Last data filed at 10/21/2024 0355  Gross per 24 hour   Intake 350 ml   Output 500 ml   Net -150 ml     Diet: Regular/House, Fluid Restriction (240 mL/tray); 1000 mL/day; Texture: Soft to Chew (NDD 3); Soft to Chew: Whole Meat; Fluid Consistency: Thin (IDDSI  0)  ----------------------------------------------------------------------------------------------------------------------  Physical exam:  General: Sitting at the edge of the bed very pleasant awake and alert answers appropriately.    No respiratory distress.    Skin:  Skin is warm and dry. No rash noted. No pallor.    HENT: Multiple basal lesions including the nose and face.  Head:  Normocephalic and atraumatic.  Mouth:  Moist mucous membranes.    Eyes:  Conjunctivae and EOM are normal.  Pupils are equal, round, and reactive to light.  No scleral icterus.    Neck:  Neck supple.  No JVD present.  No lymphadenopathy  Pulmonary/Chest:  No respiratory distress, no wheezes, no crackles, with normal breath sounds and good air movement.  Cardiovascular:  Normal rate, regular rhythm and normal heart sounds with no murmur.  Abdominal:  Soft.  Bowel sounds are normal.  No distension and no tenderness.   Extremities:  No edema, no tenderness, and no deformity.  No red or swollen joints anywhere.  Strong pulses in all 4 extremities with no clubbing, no cyanosis, no edema.  Neurological:  Motor strength equal no obvious deficit, sensory grossly intact.   No cranial nerve deficit.  No tongue deviation.  No facial droop.  No slurred speech.    Genitourinary: No Santana catheter  Back:  ----------------------------------------------------------------------------------------------------------------------  ----------------------------------------------------------------------------------------------------------------------  Results from last 7 days   Lab Units 10/20/24  1309 10/20/24  1110   HSTROP T ng/L 29* 27*     Results from last 7 days   Lab Units 10/21/24  0104 10/20/24  1110   CRP mg/dL  --  12.93*   LACTATE mmol/L  --  2.0   WBC 10*3/mm3 11.81* 14.19*   HEMOGLOBIN g/dL 8.0* 9.0*   HEMATOCRIT % 26.9* 29.9*   MCV fL 87.1 86.9   MCHC g/dL 29.7* 30.1*   PLATELETS 10*3/mm3 386 433   INR   --  1.71*     Results from last 7 days  "  Lab Units 10/20/24  1112   PH, ARTERIAL pH units 7.430   PO2 ART mm Hg 68.0*   PCO2, ARTERIAL mm Hg 38.0   HCO3 ART mmol/L 25.2     Results from last 7 days   Lab Units 10/21/24  0104 10/20/24  1110   SODIUM mmol/L 128* 124*   POTASSIUM mmol/L 4.6 4.6   MAGNESIUM mg/dL 1.8  --    CHLORIDE mmol/L 93* 88*   CO2 mmol/L 26.6 25.5   BUN mg/dL 18 16   CREATININE mg/dL 1.07 0.93   CALCIUM mg/dL 10.0 10.3   GLUCOSE mg/dL 68 163*   ALBUMIN g/dL 2.7* 3.1*   BILIRUBIN mg/dL 0.2 0.3   ALK PHOS U/L 82 93   AST (SGOT) U/L 22 24   ALT (SGPT) U/L 24 28   Estimated Creatinine Clearance: 57 mL/min (by C-G formula based on SCr of 1.07 mg/dL).    No results found for: \"AMMONIA\"      No results found for: \"BLOODCX\"  No results found for: \"URINECX\"  No results found for: \"WOUNDCX\"  No results found for: \"STOOLCX\"    I have personally looked at the labs and they are summarized above.  ----------------------------------------------------------------------------------------------------------------------  Imaging Results (Last 24 Hours)       Procedure Component Value Units Date/Time    CT Chest Without Contrast Diagnostic [[id number]] Collected: 10/20/24 1528     Updated: 10/20/24 1535    Narrative:      PROCEDURE: CT of the chest performed without IV contrast on October 20, 2024. The examination was performed with 3 mm axial imaging and 3 mm  sagittal and coronal reconstruction images. The examination was  performed according to as low as reasonably achievable dose protocol.  Total . The examination was performed according to as low as  reasonably achievable dose protocol.     HISTORY: Shortness of breath.     COMPARISON: None.     FINDINGS:     Enlarged heart size  Moderate size left pleural effusion.  Small size right pleural effusion.  Emphysematous changes in the upper lobes.  Airspace consolidation with central cavitation in the superior segment  of the left lower lobe consistent with pneumonia.  Airspace consolidation and " atelectasis and scarring also noted in the  lower lingula.  Contribution from underlying mass lesion and neoplasm in the left lower  lobe is not excluded.  Focal airspace consolidation with spiculated margins in the anterior  aspect of the left upper lobe adjacent to the pleural margin and seen  best on image 34, series 3.  Markedly enlarged mediastinal lymph nodes suspicious for metastatic  disease to the mediastinum. Markedly enlarged AP window and subcarinal  and left hilar adenopathy.  Normal thyroid gland.  Sternotomy.  No pericardial effusion.  Coronary arterial vascular calcifications.  Moderate volume of dense calcified plaque along the aortic arch and  descending thoracic aortic segments.  Small cyst in the left hepatic lobe.       Impression:         1.  Heterogeneous area of consolidation in the superior segment of the  left lower lobe with central cavitation consistent with pneumonia with  probable contribution from underlying mass lesion/neoplasm.  2.  Markedly enlarged mediastinal lymph nodes including subcarinal and  AP window lymph nodes with heterogeneous attenuation suggestive of  underlying metastatic disease and neoplastic involvement.  3.  Enlarged heart size.  4.  Small right pleural effusion.  5.  Moderate size left pleural effusion.  6.  Focal area of consolidation in the anterior aspect of the left upper  lobe with spiculated margins suspicious for additional mass lesion.  7.  Emphysema.  8.  No pneumothorax.  9.  Small left hepatic cyst.  10.  Degenerative disc disease throughout the thoracic spine with  endplate and facet hypertrophic changes.        This report was finalized on 10/20/2024 3:33 PM by Chico Modi MD.       CT Abdomen Pelvis Without Contrast [[id number]] Collected: 10/20/24 1235     Updated: 10/20/24 1242    Narrative:      PROCEDURE: CT of the abdomen and pelvis performed without IV contrast on  October 20, 2024. The examination was performed with 4 mm axial imaging  and  4 mm sagittal and coronal reconstruction images. The examination was  performed according to as low as reasonably achievable dose protocol.     HISTORY: Abdominal pain.     COMPARISON: None.     FINDINGS:     Enlarged heart size.  Small size right pleural effusion.  Moderate size left pleural effusion.  Compressive atelectasis at the lung bases, left greater than right.  Sternotomy.  No pericardial effusion.  Mild atelectasis also noted in the lower lingula.  No acute process seen in the liver, pancreas, adrenal glands, and  kidneys.  A normal appendix is well seen.  Mild constipation throughout the colon.  Mild enlarged prostate gland.  Mild osteoarthritis at the right and left hip joint.  Multilevel degenerative disc disease throughout the lumbar spine with  endplate and facet hypertrophic changes.  No free fluid or free air. No abscess or hematoma.  Small umbilical hernia contains only fat.  Small left hepatic cyst.  Mild ectatic infrarenal abdominal aorta up to 2.69 cm in diameter.  No retroperitoneal hemorrhage.  Renal vascular calcifications on the right and left side.  1 mm calcification at the lower pole of the left kidney seen best on  image 50, series 2 is nonobstructive.       Impression:         1.  Mild enlarged heart size.  2.  Bilateral pleural effusions, left greater than right with some mild  compressive atelectasis at the lung bases.  3.  Normal appendix is well seen.  4.  Slightly ectatic infrarenal abdominal aortic segment.  5.  Small left hepatic cyst.  6.  1 mm calcification in the lower pole of the left kidney.  7.  Degenerative disc disease throughout the lumbar spine with endplate  and facet hypertrophic changes.  8.  Fluid-filled distended bladder.  9.  Mild enlarged prostate gland.  10.  Mild colon and sigmoid colon diverticulosis.  11.  Mild constipation throughout the colon.  12.  No features of enterocolitis.  13.  No diverticulitis.  14.  No free fluid or free air.  15.  No abscess or  hematoma.     This report was finalized on 10/20/2024 12:40 PM by Chico Modi MD.       XR Chest AP [[id number]] Collected: 10/20/24 1222     Updated: 10/20/24 1239    Narrative:      PROCEDURE: Chest x-ray examination performed on October 20, 2024. Single  view. Upright position.     HISTORY: Shortness of breath.     COMPARISON: None.     FINDINGS:     Enlarged heart size  Sternotomy.  Coarsened bronchovascular pattern to the lungs.  Airspace consolidation in the left lower lobe and lower lingula  suggestive of underlying multifocal pneumonia.  No edema.  Small left pleural effusion.   No pneumothorax.       Impression:      Impression:     1.  Enlarged heart size.  2.  Airspace disease in the left lower lobe and lower lingula.  3.  Small left pleural effusion.  4.  Sternotomy.  5.  Coarsened bronchovascular pattern to the lungs  6.  No pneumothorax.        This report was finalized on 10/20/2024 12:24 PM by Chico Modi MD.             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    Continue antibiotic, monitor sodium, H&H monitoring, speech evaluation for hoarseness of voice, will request pulmonology input, Accu-Chek and sliding scale.  PT OT.    Disposition pending      Nurys Campos MD  10/21/24  08:20 EDT    Electronically signed by Nurys Campos MD at 10/21/24 8176           Consult Notes (all)        Boyd Del Real MD at 10/21/24 1244        Consult Orders    1. Inpatient Pulmonology Consult [[id number]] ordered by Nurys Campos MD at 10/21/24 1147                   Pulmonary and critical care consultation note  Referring Provider: dr Campos  Reason for Consultation: Abnormal CT chest      Chief complaint -shortness of breath      History of present illness: 84-year-old male with past medical history significant for lung cancer, MI and diabetes mellitus presented to ER for evaluation of shortness of breath.  Shortness of breath has been present since last 1 month and has been progressively getting worse.  HPI from the time of admission reviewed.  On my assessment patient was on room air was going to go for a speech and swallow evaluation.    All the labs medications ins and outs and vital signs at time of admission reviewed.      Review of Systems  History obtained from chart review and the patient  General ROS: negative for - chills, fatigue or fever  Psychological ROS: negative for - anxiety or depression  ENT ROS: negative for - headaches, visual changes or vocal changes  Respiratory ROS: positive for - cough and shortness of breath  Cardiovascular ROS: no chest pain or dyspnea on exertion  Gastrointestinal ROS: no abdominal pain, change in bowel habits, or black or bloody stools  Musculoskeletal ROS: negative for - joint pain, joint stiffness or joint swelling  Neurological ROS: no TIA or stroke symptoms  Hematological: no bleeding  Skin: no bruises, no rash        History  History reviewed. No pertinent past medical history., History reviewed. No pertinent surgical history., History reviewed. No pertinent family history.,   Social History     Tobacco Use    Smoking status: Former     Current packs/day: 0.00     Types: Cigarettes     Quit date:      Years since quittin.8     Passive exposure: Past    Smokeless tobacco: Never   Vaping Use    Vaping status: Never  Used   Substance Use Topics    Alcohol use: Never    Drug use: Never   ,   Medications Prior to Admission   Medication Sig Dispense Refill Last Dose/Taking    apixaban (ELIQUIS) 5 MG tablet tablet Take 1 tablet by mouth 2 (Two) Times a Day.   10/20/2024 Morning    ascorbic acid (VITAMIN C) 250 MG tablet Take 1 tablet by mouth Daily.   10/20/2024 Morning    atorvastatin (LIPITOR) 40 MG tablet Take 0.5 tablets by mouth Every Night.   10/19/2024 Bedtime    empagliflozin (JARDIANCE) 25 MG tablet tablet Take 0.5 tablets by mouth Daily.   10/20/2024 Morning    ferrous gluconate (FERGON) 324 MG tablet Take 1 tablet by mouth Daily With Breakfast.   10/20/2024 Morning    folic acid (FOLVITE) 1 MG tablet Take 1 tablet by mouth Daily.   10/20/2024 Morning    furosemide (LASIX) 40 MG tablet Take 1 tablet by mouth Daily.   10/20/2024    glipizide (GLUCOTROL) 10 MG tablet Take 1.5 tablets by mouth 2 (Two) Times a Day Before Meals.   10/20/2024 Morning    levoFLOXacin (LEVAQUIN) 500 MG tablet Take 1 tablet by mouth Daily.   10/20/2024    lisinopril (PRINIVIL,ZESTRIL) 10 MG tablet Take 0.5 tablets by mouth Daily.   10/20/2024 Morning    metFORMIN (GLUCOPHAGE) 1000 MG tablet Take 1 tablet by mouth 2 (Two) Times a Day With Meals.   10/20/2024 Morning    metoprolol succinate XL (TOPROL-XL) 100 MG 24 hr tablet Take 0.5 tablets by mouth Daily.   10/20/2024    omeprazole (priLOSEC) 20 MG capsule Take 1 capsule by mouth Daily.   10/20/2024    sertraline (ZOLOFT) 100 MG tablet Take 1 tablet by mouth Daily.   10/20/2024    Melatonin 3 MG capsule Take 2 capsules by mouth At Night As Needed (Sleep).   Unknown    polyethylene glycol (MiraLax Mix-In [place]) 17 g packet Take 17 g by mouth Daily As Needed (Constipation).   Unknown    polyvinyl alcohol (LIQUIFILM) 1.4 % ophthalmic solution Administer 1 drop to both eyes 3 (Three) Times a Day As Needed for Dry Eyes.   Unknown    psyllium (METAMUCIL) 58.6 % packet Take 1 packet by mouth Daily As Needed  (Constipation).   Unknown    senna 8.6 MG tablet Take 2 tablets by mouth Daily As Needed for Constipation.   Unknown    sildenafil (VIAGRA) 100 MG tablet Take 1 tablet by mouth Daily As Needed for Erectile Dysfunction.   Unknown   , Scheduled Meds:  apixaban, 5 mg, Oral, BID  ascorbic acid, 250 mg, Oral, Daily  atorvastatin, 20 mg, Oral, Nightly  cefTRIAXone, 1 g, Intravenous, Q24H  doxycycline, 100 mg, Intravenous, Q12H  ferrous sulfate, 325 mg, Oral, Daily With Breakfast  folic acid, 1 mg, Oral, Daily  insulin lispro, 2-7 Units, Subcutaneous, 4x Daily AC & at Bedtime  pantoprazole, 40 mg, Oral, Q AM  sertraline, 100 mg, Oral, Daily  sodium chloride, 10 mL, Intravenous, Q12H  vitamin B-1, 200 mg, Oral, Q8H    , Continuous Infusions:    and Allergies:  Patient has no known allergies.    Objective     Vital Signs   Temp:  [97.8 °F (36.6 °C)-98.7 °F (37.1 °C)] 98.6 °F (37 °C)  Heart Rate:  [77-95] 86  Resp:  [17-20] 20  BP: ()/(51-95) 119/66    Physical Exam:             General- normal in appearance, not in any acute distress    HEENT- pupils equally reactive to light, normal in size, no scleral icterus    Neck-supple    Respiratory-respirations normal-on auscultation no wheezing no crackles,     Cardiovascular-  Normal S1 and S2. No S3, S4 or murmurs. No JVD, no carotid bruit and no edema, pulses normal bilaterally     GI-nontender nondistended bowel sounds positive    CNS-nonfocal    Musculoskeletal -no edema  Extremities- no obvious deformity noticed     Psychiatric-mood good, good eye contact, alert awake oriented  Skin-some healing spots on the face.  As per the patient he had skin cancer.                                                                  Results Review:    LABS:    Lab Results   Component Value Date    GLUCOSE 68 10/21/2024    BUN 18 10/21/2024    CREATININE 1.07 10/21/2024    EGFRIFNONA 45 (L) 05/22/2021    BCR 16.8 10/21/2024    CO2 26.6 10/21/2024    CALCIUM 10.0 10/21/2024    ALBUMIN  2.7 (L) 10/21/2024    AST 22 10/21/2024    ALT 24 10/21/2024    WBC 11.81 (H) 10/21/2024    HGB 8.0 (L) 10/21/2024    HCT 26.9 (L) 10/21/2024    MCV 87.1 10/21/2024     10/21/2024     (L) 10/21/2024    K 4.6 10/21/2024    CL 93 (L) 10/21/2024    ANIONGAP 8.4 10/21/2024       Lab Results   Component Value Date    INR 1.71 (H) 10/20/2024    INR 1.16 (H) 08/26/2024    INR 1.20 (H) 08/31/2023    PROTIME 20.2 (H) 10/20/2024    PROTIME 14.9 (H) 08/26/2024    PROTIME 15.8 (H) 08/31/2023       Results from last 7 days   Lab Units 10/20/24  1110   INR  1.71*   APTT seconds 53.4*          I reviewed the patient's new clinical results.  I reviewed the patient's new imaging results and agree with the interpretation.    Microbiology Results (last 10 days)       Procedure Component Value - Date/Time    Blood Culture - Blood, Arm, Right [[id number]]  (Normal) Collected: 10/20/24 1130    Lab Status: Preliminary result Specimen: Blood from Arm, Right Updated: 10/21/24 1145     Blood Culture No growth at 24 hours    COVID-19, FLU A/B, RSV PCR 1 HR TAT - Swab, Nasopharynx [[id number]]  (Normal) Collected: 10/20/24 1130    Lab Status: Final result Specimen: Swab from Nasopharynx Updated: 10/20/24 1220     COVID19 Not Detected     Influenza A PCR Not Detected     Influenza B PCR Not Detected     RSV, PCR Not Detected    Narrative:      Fact sheet for providers: https://www.fda.gov/media/732738/download    Fact sheet for patients: https://www.fda.gov/media/404050/download    Test performed by PCR.    Blood Culture - Blood, Arm, Right [[id number]]  (Normal) Collected: 10/20/24 1110    Lab Status: Preliminary result Specimen: Blood from Arm, Right Updated: 10/21/24 1145     Blood Culture No growth at 24 hours           Assessment & Plan         Abnormal CT chest-patient already has a diagnosis of lung cancer and will be going for chemo and radiation.    Procalitonin Results:      Lab 10/20/24  1110   PROCALCITONIN 0.08       Continue current antibiotics.    PT OT to avoid deconditioning    Speech and swallow to make sure patient is not aspirating.    Follow-up with hematology oncology on the outpatient basis for the treatment of squamous cell lung cancer.    Cardiology- hemodynamically -stable.  Continue current treatment  Continue to monitor HR- rate and rhythm, BP     Nephrology- Cr and BUN stable  I/O-ins and outs reviewed    GI-continue current diet.    Hematology- CBC  Hb-transfuse for less than 7  platelet  WBC    ID-procalcitonin being normal.  Microbiology negative.  Can consider discontinuing antibiotics.    Endrocrinology-diabetes mellitus-maintain Blood sugar 140 -180    Electrolytes-   Mag and phos       DVT prophylaxis-continue        Family member present-none                Inability to walk          Boyd Del Real MD  10/21/24  12:44 EDT         Electronically signed by Boyd Del Real MD at 10/21/24 7978

## 2024-10-22 NOTE — PROGRESS NOTES
Pulmonary and critical care consultation note  Referring Provider: dr Campos  Reason for Consultation: Abnormal CT chest      Chief complaint -shortness of breath      Sub-overnight events reviewed.  All the labs medications ins and outs and vitals reviewed.  Patient resting in bed comfortably    Had a speech and swallow evaluation and was found to be aspirating.    Review of Systems  Positive for shortness of breath otherwise negative.        History  History reviewed. No pertinent past medical history., History reviewed. No pertinent surgical history., History reviewed. No pertinent family history.,   Social History     Tobacco Use    Smoking status: Former     Current packs/day: 0.00     Types: Cigarettes     Quit date:      Years since quittin.8     Passive exposure: Past    Smokeless tobacco: Never   Vaping Use    Vaping status: Never Used   Substance Use Topics    Alcohol use: Never    Drug use: Never   ,   Medications Prior to Admission   Medication Sig Dispense Refill Last Dose/Taking    apixaban (ELIQUIS) 5 MG tablet tablet Take 1 tablet by mouth 2 (Two) Times a Day.   10/20/2024 Morning    ascorbic acid (VITAMIN C) 250 MG tablet Take 1 tablet by mouth Daily.   10/20/2024 Morning    atorvastatin (LIPITOR) 40 MG tablet Take 0.5 tablets by mouth Every Night.   10/19/2024 Bedtime    empagliflozin (JARDIANCE) 25 MG tablet tablet Take 0.5 tablets by mouth Daily.   10/20/2024 Morning    ferrous gluconate (FERGON) 324 MG tablet Take 1 tablet by mouth Daily With Breakfast.   10/20/2024 Morning    folic acid (FOLVITE) 1 MG tablet Take 1 tablet by mouth Daily.   10/20/2024 Morning    furosemide (LASIX) 40 MG tablet Take 1 tablet by mouth Daily.   10/20/2024    glipizide (GLUCOTROL) 10 MG tablet Take 1.5 tablets by mouth 2 (Two) Times a Day Before Meals.   10/20/2024 Morning    levoFLOXacin (LEVAQUIN) 500 MG tablet Take 1 tablet by mouth Daily.   10/20/2024    lisinopril (PRINIVIL,ZESTRIL) 10 MG tablet Take  0.5 tablets by mouth Daily.   10/20/2024 Morning    metFORMIN (GLUCOPHAGE) 1000 MG tablet Take 1 tablet by mouth 2 (Two) Times a Day With Meals.   10/20/2024 Morning    metoprolol succinate XL (TOPROL-XL) 100 MG 24 hr tablet Take 0.5 tablets by mouth Daily.   10/20/2024    omeprazole (priLOSEC) 20 MG capsule Take 1 capsule by mouth Daily.   10/20/2024    sertraline (ZOLOFT) 100 MG tablet Take 1 tablet by mouth Daily.   10/20/2024    Melatonin 3 MG capsule Take 2 capsules by mouth At Night As Needed (Sleep).   Unknown    polyethylene glycol (MiraLax Mix-In [place]) 17 g packet Take 17 g by mouth Daily As Needed (Constipation).   Unknown    polyvinyl alcohol (LIQUIFILM) 1.4 % ophthalmic solution Administer 1 drop to both eyes 3 (Three) Times a Day As Needed for Dry Eyes.   Unknown    psyllium (METAMUCIL) 58.6 % packet Take 1 packet by mouth Daily As Needed (Constipation).   Unknown    senna 8.6 MG tablet Take 2 tablets by mouth Daily As Needed for Constipation.   Unknown    sildenafil (VIAGRA) 100 MG tablet Take 1 tablet by mouth Daily As Needed for Erectile Dysfunction.   Unknown   , Scheduled Meds:  acetylcysteine, 3 mL, Nebulization, BID - RT  apixaban, 5 mg, Oral, BID  ascorbic acid, 250 mg, Oral, Daily  atorvastatin, 20 mg, Oral, Nightly  [START ON 10/23/2024] cefepime, 2,000 mg, Intravenous, Q12H  ferrous sulfate, 325 mg, Oral, Daily With Breakfast  folic acid, 1 mg, Oral, Daily  insulin lispro, 2-7 Units, Subcutaneous, 4x Daily AC & at Bedtime  ipratropium-albuterol, 3 mL, Nebulization, TID - RT  metroNIDAZOLE, 500 mg, Intravenous, Q8H  pantoprazole, 40 mg, Oral, Q AM  sertraline, 100 mg, Oral, Daily  sodium chloride, 10 mL, Intravenous, Q12H  vitamin B-1, 200 mg, Oral, Q8H  [START ON 10/23/2024] vancomycin, 1,000 mg, Intravenous, Q18H  vancomycin, 20 mg/kg, Intravenous, Once    , Continuous Infusions:  Pharmacy Consult - Pharmacy to dose,   Pharmacy Consult - Pharmacy to dose,      and Allergies:  Patient has no  known allergies.    Objective     Vital Signs   Temp:  [97.9 °F (36.6 °C)-98.8 °F (37.1 °C)] 98.8 °F (37.1 °C)  Heart Rate:  [69-94] 94  Resp:  [16-20] 16  BP: (111-137)/(59-71) 125/69    Physical Exam:             General- normal in appearance, not in any acute distress    HEENT- pupils equally reactive to light, normal in size, no scleral icterus    Neck-supple    Respiratory-respirations normal-on auscultation no wheezing no crackles,     Cardiovascular-  Normal S1 and S2. No S3, S4 or murmurs. No JVD, no carotid bruit and no edema, pulses normal bilaterally     GI-nontender nondistended bowel sounds positive    CNS-nonfocal    Musculoskeletal -no edema  Extremities- no obvious deformity noticed     Psychiatric-mood good, good eye contact, alert awake oriented  Skin-some healing spots on the face.  As per the patient he had skin cancer.                                                                  Results Review:    LABS:    Lab Results   Component Value Date    GLUCOSE 150 (H) 10/22/2024    BUN 24 (H) 10/22/2024    CREATININE 1.33 (H) 10/22/2024    EGFRIFNONA 45 (L) 05/22/2021    BCR 18.0 10/22/2024    CO2 25.3 10/22/2024    CALCIUM 9.9 10/22/2024    ALBUMIN 2.7 (L) 10/21/2024    AST 22 10/21/2024    ALT 24 10/21/2024    WBC 11.45 (H) 10/22/2024    HGB 8.1 (L) 10/22/2024    HCT 27.1 (L) 10/22/2024    MCV 88.3 10/22/2024     10/22/2024     (L) 10/22/2024    K 4.9 10/22/2024    CL 96 (L) 10/22/2024    ANIONGAP 7.7 10/22/2024       Lab Results   Component Value Date    INR 1.71 (H) 10/20/2024    INR 1.16 (H) 08/26/2024    INR 1.20 (H) 08/31/2023    PROTIME 20.2 (H) 10/20/2024    PROTIME 14.9 (H) 08/26/2024    PROTIME 15.8 (H) 08/31/2023       Results from last 7 days   Lab Units 10/20/24  1110   INR  1.71*   APTT seconds 53.4*          I reviewed the patient's new clinical results.  I reviewed the patient's new imaging results and agree with the interpretation.    Microbiology Results (last 10 days)        Procedure Component Value - Date/Time    MRSA Screen, PCR (Inpatient) - Swab, Nares [[id number]]  (Abnormal) Collected: 10/22/24 1212    Lab Status: Final result Specimen: Swab from Nares Updated: 10/22/24 1430     MRSA PCR MRSA Detected    Narrative:      The negative predictive value of this diagnostic test is high and should only be used to consider de-escalating anti-MRSA therapy. A positive result may indicate colonization with MRSA and must be correlated clinically.    Respiratory Culture - Sputum, Cough [[id number]] Collected: 10/21/24 2146    Lab Status: Preliminary result Specimen: Sputum from Cough Updated: 10/21/24 2218     Gram Stain Few (2+) Epithelial cells seen      Many (4+) WBCs seen      Moderate (3+) Mixed bacterial morphotypes seen on Gram Stain    Blood Culture - Blood, Arm, Right [[id number]]  (Normal) Collected: 10/20/24 1130    Lab Status: Preliminary result Specimen: Blood from Arm, Right Updated: 10/22/24 1145     Blood Culture No growth at 2 days    COVID-19, FLU A/B, RSV PCR 1 HR TAT - Swab, Nasopharynx [[id number]]  (Normal) Collected: 10/20/24 1130    Lab Status: Final result Specimen: Swab from Nasopharynx Updated: 10/20/24 1220     COVID19 Not Detected     Influenza A PCR Not Detected     Influenza B PCR Not Detected     RSV, PCR Not Detected    Narrative:      Fact sheet for providers: https://www.fda.gov/media/373780/download    Fact sheet for patients: https://www.fda.gov/media/756832/download    Test performed by PCR.    Blood Culture - Blood, Arm, Right [[id number]]  (Normal) Collected: 10/20/24 1110    Lab Status: Preliminary result Specimen: Blood from Arm, Right Updated: 10/22/24 1145     Blood Culture No growth at 2 days           Assessment & Plan         Abnormal CT chest-patient already has a diagnosis of lung cancer and will be going for chemo and radiation.  Continue current antibiotics-likely has aspiration versus postobstructive pneumonia.       PT OT to avoid  deconditioning    Speech and swallow test recommendations reviewed.  Continue strict aspiration precautions    Follow-up with hematology oncology on the outpatient basis for the treatment of squamous cell lung cancer.    Cardiology- hemodynamically -stable.  Continue current treatment  Continue to monitor HR- rate and rhythm, BP     Nephrology- Cr and BUN stable  I/O-ins and outs reviewed    GI-continue current diet.    Hematology- CBC  Hb-transfuse for less than 7  platelet  WBC    ID-procalcitonin being normal.  Microbiology negative.  Can consider discontinuing antibiotics.    Endrocrinology-diabetes mellitus-maintain Blood sugar 140 -180    Electrolytes-   Mag and phos       DVT prophylaxis-continue        Family member present-none                Inability to walk    Aspiration pneumonia          Boyd Del Real MD  10/22/24  16:08 EDT

## 2024-10-22 NOTE — SIGNIFICANT NOTE
10/22/24 1402   OTHER   Discipline physical therapy assistant   Rehab Time/Intention   Session Not Performed patient/family declined, not feeling well

## 2024-10-22 NOTE — PAYOR COMM NOTE
"CONTACT: AYAAN NAIK RN  UTILIZATION MANAGEMENT DEPT.  [place] [id number]  PHONE: [phone]  FAX: [phone]      INPATIENT AUTH REQUEST    PLACED IN OBS ON 10/20/24, CONVERTED TO INPT ON 10/22/24    REF# [id number]       Albin Andrade (84 y.o. Male)       Date of Birth   1940    Social Security Number       Address   [address]  [facility] [address]    Home Phone   [phone]    MRN   [id number]       Sikhism   None    Marital Status   Single                            Admission Date   10/20/24    Admission Type   Emergency    Admitting Provider   Dakota Bruce MD    Attending Provider   Nurys Campos MD    Department, Room/Bed   [address], [address]/       Discharge Date       Discharge Disposition       Discharge Destination                                 Attending Provider: Nurys Campos MD    Allergies: No Known Allergies    Isolation: None   Infection: None   Code Status: CPR    Ht: 175.3 cm (69\")   Wt: 85.7 kg (189 lb)    Admission Cmt: None   Principal Problem: Inability to walk [R26.2]                   Active Insurance as of 10/20/2024       Primary Coverage       Payor Plan Insurance Group Employer/Plan Group    [employer] [facility] [employer]        Payor Plan Address Payor Plan Phone Number Payor Plan Fax Number Effective Dates    [address] [phone]  12/1/2022 - None Entered    [facility] [id number]         Subscriber Name Subscriber Birth Date Member ID       ALIBN ANDRADE 1940 [id number]               Secondary Coverage       Payor Plan Insurance Group Employer/Plan Group    [facility] DEPT [id number]        Payor Plan Address Payor Plan Phone Number Payor Plan Fax Number Effective Dates    [facility] OFFICE OF COMMUNITY CARE [phone]  5/21/2021 - None Entered    [address] [address]       [facility] [address]         Subscriber Name Subscriber Birth Date Member ID       " ALBIN ANDRADE 1940 [id number]               Tertiary Coverage       Payor Plan Insurance Group Employer/Plan Group    [employer] MEDICARE REPLACEMENT [employer] MED ADV PPO [id number]       Payor Plan Address Payor Plan Phone Number Payor Plan Fax Number Effective Dates    [address] [address] [phone]  2023 - None Entered    [facility] [address]         Subscriber Name Subscriber Birth Date Member ID       ALBIN ANDRADE 1940 [id number]                     Emergency Contacts        (Rel.) Home Phone Work Phone Mobile Phone    PERRY ANDRADE (Daughter) [phone] -- --                 History & Physical        Andrade Jensen APRN at 10/20/24 2037       Attestation signed by Dakota Bruce MD at 10/21/24 4927    I have reviewed this documentation and agree.  I have discussed and formulated the assessment and plan with PHIL Jones.  I have also discussed the patient with the ED attending and reviewed the patient's past medical history, labs, vital signs, imaging, and EKGs.    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  Will order PT, OT, speech, and case management/.  Will trend the sodium levels and place him on a 1000 mL fluid restriction.                     [facility] Medicine Services  HISTORY & PHYSICAL    Patient Identification:  Name:  Albin Andrade  Age:  84 y.o.  Sex:  male  :  1940  MRN:  [id number]   Visit Number:  [id number]  Admit Date: 10/20/2024   Primary Care Physician:  Provider, No Known     Subjective     Chief complaint:   Chief Complaint   Patient presents with    Shortness of Breath     History of presenting illness:   Patient is a 84 y.o. male with past  medical history significant for diabetes, MI, and lung cancer that presented to the [facility] emergency department for evaluation of shortness of breath.  Patient states that he has had some generalized weakness for the past month.  He advises that 2 days ago he became weaker and unable to ambulate on his own.  The patient denies any chest pain or abdominal pain.  The patient states that he is struggling completing daily living activities due to his weakness.  On my examination, patient is alert and oriented x 3.  No acute distress noted.  Patient does have ROM, however patient is struggling with overall strength of movement.  The patient is in stable condition on admission to the hospital. Upon arrival to the ED, vitals were temperature 97.5, /82, heart rate 90, respirations 19, SpO2 95%. Labs obtained on arrival: pO2 68, initial troponin 27, reflex troponin 29.  proBNP 5383, sodium 124, chloride 88, glucose 163, CRP 12.93, PT 20.2, INR 1.71, PTT 53.4, WBC 14.19, hemoglobin 9.0, and hematocrit 29.9. Patient has been admitted to the medical/surgical unit for further evaluation and treatment.     Present during exam: RN  ---------------------------------------------------------------------------------------------------------------------   Review of Systems   Constitutional:  Positive for fatigue. Negative for chills and fever.   HENT: Negative.  Negative for congestion, sore throat and trouble swallowing.    Eyes: Negative.    Respiratory: Negative.  Negative for cough, shortness of breath and wheezing.    Cardiovascular: Negative.  Negative for chest pain, palpitations and leg swelling.   Gastrointestinal: Negative.  Negative for abdominal pain, diarrhea, nausea and vomiting.   Endocrine: Negative.    Genitourinary: Negative.  Negative for dysuria.   Musculoskeletal: Negative.  Negative for neck pain and neck stiffness.   Skin: Negative.    Allergic/Immunologic: Negative.    Neurological:  Positive  for weakness. Negative for dizziness, tremors, seizures, syncope, facial asymmetry, speech difficulty, light-headedness, numbness and headaches.   Hematological: Negative.    Psychiatric/Behavioral: Negative.         Otherwise 10-system ROS reviewed and is negative except as mentioned in the HPI.    ---------------------------------------------------------------------------------------------------------------------   History reviewed. No pertinent past medical history.  History reviewed. No pertinent surgical history.  History reviewed. No pertinent family history.  Social History     Socioeconomic History    Marital status: Single   Tobacco Use    Smoking status: Former     Current packs/day: 0.00     Types: Cigarettes     Quit date:      Years since quittin.8     Passive exposure: Past    Smokeless tobacco: Never   Vaping Use    Vaping status: Never Used   Substance and Sexual Activity    Alcohol use: Never    Drug use: Never    Sexual activity: Defer     ---------------------------------------------------------------------------------------------------------------------   Allergies:  Patient has no known allergies.  ---------------------------------------------------------------------------------------------------------------------   Medications below are reported home medications pulling from within the system; at this time, these medications have not been reconciled unless otherwise specified and are in the verification process for further verifcation as current home medications.    Prior to Admission Medications       None          ---------------------------------------------------------------------------------------------------------------------    Objective     Hospital Scheduled Meds:  insulin lispro, 2-7 Units, Subcutaneous, 4x Daily AC & at Bedtime  sodium chloride, 10 mL, Intravenous, Q12H             Current listed hospital scheduled medications may not yet reflect those currently placed in  orders that are signed and held, awaiting patient's arrival to floor/unit.    ---------------------------------------------------------------------------------------------------------------------   Vital Signs:  Temp:  [97.5 °F (36.4 °C)-98.7 °F (37.1 °C)] 98.7 °F (37.1 °C)  Heart Rate:  [76-96] 83  Resp:  [17-20] 18  BP: (101-140)/(59-95) 101/59  Mean Arterial Pressure (Non-Invasive) for the past 24 hrs (Last 3 readings):   Noninvasive MAP (mmHg)   10/20/24 2116 100   10/20/24 2059 86   10/20/24 2044 98     SpO2 Percentage    10/20/24 2059 10/20/24 2116 10/20/24 2240   SpO2: 98% 96% 95%     SpO2:  [92 %-98 %] 95 %  on   ;   Device (Oxygen Therapy): room air    Body mass index is 28.16 kg/m².  Wt Readings from Last 3 Encounters:   10/20/24 86.5 kg (190 lb 11.2 oz)   08/26/24 90.7 kg (200 lb)   08/31/23 99.8 kg (220 lb)       ---------------------------------------------------------------------------------------------------------------------   Physical Exam  Vitals and nursing note reviewed.   Constitutional:       General: He is awake. He is not in acute distress.     Appearance: Normal appearance. He is normal weight. He is not ill-appearing or diaphoretic.   HENT:      Head: Normocephalic and atraumatic.      Nose: Nose normal.      Mouth/Throat:      Mouth: Mucous membranes are moist.      Pharynx: Oropharynx is clear.   Eyes:      Extraocular Movements: Extraocular movements intact.      Pupils: Pupils are equal, round, and reactive to light.   Cardiovascular:      Rate and Rhythm: Normal rate and regular rhythm.      Pulses: Normal pulses.           Dorsalis pedis pulses are 2+ on the right side and 2+ on the left side.      Heart sounds: Normal heart sounds. No murmur heard.     No friction rub.   Pulmonary:      Effort: Pulmonary effort is normal. No accessory muscle usage, respiratory distress or retractions.      Breath sounds: Normal breath sounds. No wheezing, rhonchi or rales.   Abdominal:      General:  Bowel sounds are normal. There is no distension.      Palpations: Abdomen is soft.      Tenderness: There is no abdominal tenderness. There is no guarding.   Musculoskeletal:         General: Normal range of motion.      Cervical back: Normal range of motion and neck supple. No rigidity.      Right lower leg: No edema.      Left lower leg: No edema.   Skin:     General: Skin is warm and dry.      Capillary Refill: Capillary refill takes 2 to 3 seconds.   Neurological:      General: No focal deficit present.      Mental Status: He is alert and oriented to person, place, and time. Mental status is at baseline.      Cranial Nerves: No dysarthria or facial asymmetry.      Sensory: Sensation is intact.      Motor: No weakness or tremor.   Psychiatric:         Attention and Perception: Attention normal.         Mood and Affect: Mood normal.         Speech: Speech normal.         Behavior: Behavior normal. Behavior is cooperative.         Thought Content: Thought content normal.         Cognition and Memory: Cognition normal.         Judgment: Judgment normal.          ---------------------------------------------------------------------------------------------------------------------  EKG: Atrial fibrillation, unconfirmed by cardiology      Telemetry:    Atrial fibrillation    I have personally reviewed the EKG/Telemetry strip  ---------------------------------------------------------------------------------------------------------------------   Results from last 7 days   Lab Units 10/20/24  1309 10/20/24  1110   HSTROP T ng/L 29* 27*     Results from last 7 days   Lab Units 10/20/24  1110   PROBNP pg/mL 5,383.0*       Results from last 7 days   Lab Units 10/20/24  1112   PH, ARTERIAL pH units 7.430   PO2 ART mm Hg 68.0*   PCO2, ARTERIAL mm Hg 38.0   HCO3 ART mmol/L 25.2     Results from last 7 days   Lab Units 10/20/24  1110   CRP mg/dL 12.93*   LACTATE mmol/L 2.0   WBC 10*3/mm3 14.19*   HEMOGLOBIN g/dL 9.0*   HEMATOCRIT  "% 29.9*   MCV fL 86.9   MCHC g/dL 30.1*   PLATELETS 10*3/mm3 433   INR  1.71*     Results from last 7 days   Lab Units 10/20/24  1110   SODIUM mmol/L 124*   POTASSIUM mmol/L 4.6   CHLORIDE mmol/L 88*   CO2 mmol/L 25.5   BUN mg/dL 16   CREATININE mg/dL 0.93   CALCIUM mg/dL 10.3   GLUCOSE mg/dL 163*   ALBUMIN g/dL 3.1*   BILIRUBIN mg/dL 0.3   ALK PHOS U/L 93   AST (SGOT) U/L 24   ALT (SGPT) U/L 28   Estimated Creatinine Clearance: 65.5 mL/min (by C-G formula based on SCr of 0.93 mg/dL).  No results found for: \"AMMONIA\"    Glucose   Date/Time Value Ref Range Status   10/20/2024 2207 195 (H) 70 - 130 mg/dL Final   10/20/2024 2103 154 (H) 70 - 130 mg/dL Final   10/20/2024 2005 58 (L) 70 - 130 mg/dL Final     No results found for: \"HGBA1C\"  No results found for: \"TSH\", \"FREET4\"    Microbiology Results (last 10 days)       Procedure Component Value - Date/Time    COVID-19, FLU A/B, RSV PCR 1 HR TAT - Swab, Nasopharynx [[id number]]  (Normal) Collected: 10/20/24 1130    Lab Status: Final result Specimen: Swab from Nasopharynx Updated: 10/20/24 1220     COVID19 Not Detected     Influenza A PCR Not Detected     Influenza B PCR Not Detected     RSV, PCR Not Detected    Narrative:      Fact sheet for providers: https://www.fda.gov/media/436367/download    Fact sheet for patients: https://www.fda.gov/media/832088/download    Test performed by PCR.           Pain Management Panel          Latest Ref Rng & Units 9/1/2023   Pain Management Panel   Amphetamine, Urine Qual Negative Negative    Barbiturates Screen, Urine Negative Negative    Benzodiazepine Screen, Urine Negative Negative    Buprenorphine, Screen, Urine Negative Negative    Cocaine Screen, Urine Negative Negative    Fentanyl, Urine Negative Negative    Methadone Screen , Urine Negative Negative    Methamphetamine, Ur Negative Negative       Details                 I have personally reviewed the above laboratory results. "   ---------------------------------------------------------------------------------------------------------------------  Imaging Results (Last 7 Days)       Procedure Component Value Units Date/Time    CT Chest Without Contrast Diagnostic [[id number]] Collected: 10/20/24 1528     Updated: 10/20/24 1535    Narrative:      PROCEDURE: CT of the chest performed without IV contrast on October 20, 2024. The examination was performed with 3 mm axial imaging and 3 mm  sagittal and coronal reconstruction images. The examination was  performed according to as low as reasonably achievable dose protocol.  Total . The examination was performed according to as low as  reasonably achievable dose protocol.     HISTORY: Shortness of breath.     COMPARISON: None.     FINDINGS:     Enlarged heart size  Moderate size left pleural effusion.  Small size right pleural effusion.  Emphysematous changes in the upper lobes.  Airspace consolidation with central cavitation in the superior segment  of the left lower lobe consistent with pneumonia.  Airspace consolidation and atelectasis and scarring also noted in the  lower lingula.  Contribution from underlying mass lesion and neoplasm in the left lower  lobe is not excluded.  Focal airspace consolidation with spiculated margins in the anterior  aspect of the left upper lobe adjacent to the pleural margin and seen  best on image 34, series 3.  Markedly enlarged mediastinal lymph nodes suspicious for metastatic  disease to the mediastinum. Markedly enlarged AP window and subcarinal  and left hilar adenopathy.  Normal thyroid gland.  Sternotomy.  No pericardial effusion.  Coronary arterial vascular calcifications.  Moderate volume of dense calcified plaque along the aortic arch and  descending thoracic aortic segments.  Small cyst in the left hepatic lobe.       Impression:         1.  Heterogeneous area of consolidation in the superior segment of the  left lower lobe with central  cavitation consistent with pneumonia with  probable contribution from underlying mass lesion/neoplasm.  2.  Markedly enlarged mediastinal lymph nodes including subcarinal and  AP window lymph nodes with heterogeneous attenuation suggestive of  underlying metastatic disease and neoplastic involvement.  3.  Enlarged heart size.  4.  Small right pleural effusion.  5.  Moderate size left pleural effusion.  6.  Focal area of consolidation in the anterior aspect of the left upper  lobe with spiculated margins suspicious for additional mass lesion.  7.  Emphysema.  8.  No pneumothorax.  9.  Small left hepatic cyst.  10.  Degenerative disc disease throughout the thoracic spine with  endplate and facet hypertrophic changes.        This report was finalized on 10/20/2024 3:33 PM by Chico Modi MD.       CT Abdomen Pelvis Without Contrast [[id number]] Collected: 10/20/24 1235     Updated: 10/20/24 1242    Narrative:      PROCEDURE: CT of the abdomen and pelvis performed without IV contrast on  October 20, 2024. The examination was performed with 4 mm axial imaging  and 4 mm sagittal and coronal reconstruction images. The examination was  performed according to as low as reasonably achievable dose protocol.     HISTORY: Abdominal pain.     COMPARISON: None.     FINDINGS:     Enlarged heart size.  Small size right pleural effusion.  Moderate size left pleural effusion.  Compressive atelectasis at the lung bases, left greater than right.  Sternotomy.  No pericardial effusion.  Mild atelectasis also noted in the lower lingula.  No acute process seen in the liver, pancreas, adrenal glands, and  kidneys.  A normal appendix is well seen.  Mild constipation throughout the colon.  Mild enlarged prostate gland.  Mild osteoarthritis at the right and left hip joint.  Multilevel degenerative disc disease throughout the lumbar spine with  endplate and facet hypertrophic changes.  No free fluid or free air. No abscess or hematoma.  Small  umbilical hernia contains only fat.  Small left hepatic cyst.  Mild ectatic infrarenal abdominal aorta up to 2.69 cm in diameter.  No retroperitoneal hemorrhage.  Renal vascular calcifications on the right and left side.  1 mm calcification at the lower pole of the left kidney seen best on  image 50, series 2 is nonobstructive.       Impression:         1.  Mild enlarged heart size.  2.  Bilateral pleural effusions, left greater than right with some mild  compressive atelectasis at the lung bases.  3.  Normal appendix is well seen.  4.  Slightly ectatic infrarenal abdominal aortic segment.  5.  Small left hepatic cyst.  6.  1 mm calcification in the lower pole of the left kidney.  7.  Degenerative disc disease throughout the lumbar spine with endplate  and facet hypertrophic changes.  8.  Fluid-filled distended bladder.  9.  Mild enlarged prostate gland.  10.  Mild colon and sigmoid colon diverticulosis.  11.  Mild constipation throughout the colon.  12.  No features of enterocolitis.  13.  No diverticulitis.  14.  No free fluid or free air.  15.  No abscess or hematoma.     This report was finalized on 10/20/2024 12:40 PM by Chico Modi MD.       XR Chest AP [[id number]] Collected: 10/20/24 1222     Updated: 10/20/24 1239    Narrative:      PROCEDURE: Chest x-ray examination performed on October 20, 2024. Single  view. Upright position.     HISTORY: Shortness of breath.     COMPARISON: None.     FINDINGS:     Enlarged heart size  Sternotomy.  Coarsened bronchovascular pattern to the lungs.  Airspace consolidation in the left lower lobe and lower lingula  suggestive of underlying multifocal pneumonia.  No edema.  Small left pleural effusion.   No pneumothorax.       Impression:      Impression:     1.  Enlarged heart size.  2.  Airspace disease in the left lower lobe and lower lingula.  3.  Small left pleural effusion.  4.  Sternotomy.  5.  Coarsened bronchovascular pattern to the lungs  6.  No pneumothorax.         This report was finalized on 10/20/2024 12:24 PM by Chico Modi MD.             I have personally reviewed the above radiology results.     Last Echocardiogram:  No echocardiogram on file  ---------------------------------------------------------------------------------------------------------------------    Assessment & Plan      ACUTE HOSPITAL PROBLEMS    -Acute physical debility, on admission  -Acute on chronic anemia, on admission    CMP and CBC in the a.m.  Physical therapy consult placed  Occupational Therapy consult placed  Monitor hemoglobin level with a.m. labs  No signs of active bleeding    -F/E/N  Replace electrolytes per protocol as necessary.  Consistent carb diet.     CHRONIC MEDICAL PROBLEMS    -Diabetes  -Squamous cell lung cancer  -Basal cell carcinoma  -Macular degeneration  -History of MI  -Diabetic retinopathy    Vital signs per hospital policy  Insulin sliding scale  Blood glucose checks before meals and at bedtime  Continue home medication regimen on pharmacy reconciliation  ---------------------------------------------------  Activity: Bedrest  ---------------------------------------------------  The patient is considered to be a high risk patient due to: Past medical history.    OBSERVATION status; however, if further evaluation or treatment plans warrant, status may change.  Based upon current information, I predict patient's care encounter to be less than or equal to 2 midnights     Code Status: Full code  ---------------------------------------------------  Disposition/Discharge planning: Consult case management for discharge planning.  ---------------------------------------------------  I have discussed the patient's assessment and plan with attending physician Dakota Valle MD Carl B Gray, APRN     10/21/24  00:25 EDT    Attending Physician: Dakota Bruce MD       Electronically signed by Dakota Bruce MD at 10/21/24 0548          Emergency Department Notes         Luke Álvarez PCT at 10/20/24 2310          FACESHEET [facility]FUSAL [facility] FORM FAXED TO [phone]    Electronically signed by Luke Álvarez PCT at 10/20/24 2311       David Lopez at 10/20/24 2105          [id number] provider and rn aware     Electronically signed by David Lopez at 10/20/24 2105       Bryan Retana PCT at 10/20/24 2007          POC glucose was 58 mg/dL. RN notified      Electronically signed by Bryan Retana PCT at 10/20/24 2007       David Lopez at 10/20/24 1903          Contacted [facility]. Took info and advised would call back after 2000 due to shift change. Provider aware    Electronically signed by David Lopez at 10/20/24 1904       Mckinley Knox, APRN at 10/20/24 1056          Subjective   History of Present Illness  Patient is a 84-year-old male who presents to the emergency room today for shortness of breath, nausea, and abdominal pain.  Patient reports that he has been short of breath for several days and states he continues to get more unwell.  Patient also reports vomiting when he attempts to eat.  Patient reports that been going on for some time.  Patient reports that he lives alone and states [facility] to get help with care.  Patient denies chest pain.  Patient denies fever.  Patient does report having hard time getting up.    Shortness of Breath      Review of Systems   HENT: Negative.     Eyes: Negative.    Respiratory:  Positive for shortness of breath.    Cardiovascular: Negative.    Gastrointestinal: Negative.    Endocrine: Negative.    Genitourinary: Negative.    Musculoskeletal: Negative.    Skin: Negative.    Allergic/Immunologic: Negative.    Neurological:  Positive for weakness.   Hematological: Negative.    Psychiatric/Behavioral: Negative.         History reviewed. No pertinent past medical history.    No Known Allergies    No past surgical history on  file.    History reviewed. No pertinent family history.    Social History     Socioeconomic History    Marital status: Single           Objective   Physical Exam  Vitals and nursing note reviewed.   Constitutional:       Appearance: He is well-developed.   HENT:      Head: Normocephalic.      Right Ear: External ear normal.      Left Ear: External ear normal.   Eyes:      Conjunctiva/sclera: Conjunctivae normal.      Pupils: Pupils are equal, round, and reactive to light.   Cardiovascular:      Rate and Rhythm: Normal rate and regular rhythm.      Heart sounds: Normal heart sounds.   Pulmonary:      Effort: Pulmonary effort is normal.      Breath sounds: Wheezing present.   Abdominal:      General: Bowel sounds are normal.      Palpations: Abdomen is soft.   Musculoskeletal:         General: Normal range of motion.      Cervical back: Normal range of motion and neck supple.   Skin:     General: Skin is warm and dry.      Capillary Refill: Capillary refill takes less than 2 seconds.   Neurological:      Mental Status: He is alert and oriented to person, place, and time.   Psychiatric:         Behavior: Behavior normal.         Thought Content: Thought content normal.         Procedures          ED Course  ED Course as of 10/20/24 2022   Sun Oct 20, 2024   1232 EKG performed on 10/20/2024 at 1055 shows atrial fibrillation, rate 91, QRS 96.  QRS 96.  No STEMI.  Electronically signed by Alexis Chaudhry DO, 10/20/24, 12:32 PM EDT.   [NG]   1953 Patient continues to request to be admitted.  [facility] patient [facility] but [facility] and request to stay here in the hospital. [ROSALVA]      ED Course User Index  [ROSALVA] Mckinley Knox, APRN  [NG] Alexis Chaudhry DO                                             Medical Decision Making  MDM:    Escalation of care including admission/observation considered    - Discussions of management with other providers:  None and  Hospitalist    - Discussed/reviewed with Radiology regarding test interpretation    - Independent interpretation: None    - Additional patient history obtained from: None    - Review of external non-ED record (if available):  Prior Inpt record, Office record, Outpt record, Prior Outpt labs, PCP record, Outside ED record, Other    - Chronic conditions affecting care: See HPI and medical Hx.    - Social Determinants of health significantly affecting care:  None        Medical Decision Making Discussion:    Patient is a 84-year-old male who presents to the emergency room today for shortness of breath, nausea, and abdominal pain.  Patient reports that he has been short of breath for several days and states he continues to get more unwell.  Patient also reports vomiting when he attempts to eat.  Patient reports that been going on for some time.  Patient reports that he lives alone and states that [facility] or be admitted to get help with care.  Patient denies chest pain.  Patient denies fever.  Patient does report having hard time getting up.    Patient was admitted under care of hospitalist.       The patient has been given very strict return precautions to return to the emergency department should there be any acute change or worsening of their condition.  I have explained my findings and the patient has expressed understanding to me.  I explained that the work-up performed in the ED has been based on the specific complaint and concern, as the nature of emergency medicine is complaint driven and they understand that new symptoms may arise.  I have told them that, should there be any new symptoms, worsening or changing symptoms, a new work-up may be indicated that they are encouraged to return to the emergency department or promptly contact their primary care physician. We have employed a shared decision-making process as the discussion of their disposition.  The patient has been educated as to the  nature of the visit, the tests and work-up performed and the findings from today's visit. At this time, there does not appear to be any acute emergent process that necessitates admission to the hospital, however, the patient understands that this can change unexpectedly. At this time, the patient is stable for discharge home and agrees to follow-up with her primary care physician in the next 24 to 48 hours or earlier should they be able to obtain an appointment.    The patient was counseled regarding diagnostic results and treatment plan and patient has indicated understanding of these instructions.     Problems Addressed:  Pneumonia of left lung due to infectious organism, unspecified part of lung: complicated acute illness or injury    Amount and/or Complexity of Data Reviewed  Labs: ordered. Decision-making details documented in ED Course.  Radiology: ordered. Decision-making details documented in ED Course.  ECG/medicine tests: ordered.    Risk  Prescription drug management.  Decision regarding hospitalization.      Results for orders placed or performed during the hospital encounter of 10/20/24   ECG 12 Lead Dyspnea    Collection Time: 10/20/24 10:55 AM   Result Value Ref Range    QT Interval 402 ms    QTC Interval 494 ms   Comprehensive Metabolic Panel    Collection Time: 10/20/24 11:10 AM    Specimen: Arm, Right; Blood   Result Value Ref Range    Glucose 163 (H) 65 - 99 mg/dL    BUN 16 8 - 23 mg/dL    Creatinine 0.93 0.76 - 1.27 mg/dL    Sodium 124 (L) 136 - 145 mmol/L    Potassium 4.6 3.5 - 5.2 mmol/L    Chloride 88 (L) 98 - 107 mmol/L    CO2 25.5 22.0 - 29.0 mmol/L    Calcium 10.3 8.6 - 10.5 mg/dL    Total Protein 7.6 6.0 - 8.5 g/dL    Albumin 3.1 (L) 3.5 - 5.2 g/dL    ALT (SGPT) 28 1 - 41 U/L    AST (SGOT) 24 1 - 40 U/L    Alkaline Phosphatase 93 39 - 117 U/L    Total Bilirubin 0.3 0.0 - 1.2 mg/dL    Globulin 4.5 gm/dL    A/G Ratio 0.7 g/dL    BUN/Creatinine Ratio 17.2 7.0 - 25.0    Anion Gap 10.5 5.0 -  15.0 mmol/L    eGFR 81.5 >60.0 mL/min/1.73   Protime-INR    Collection Time: 10/20/24 11:10 AM    Specimen: Arm, Right; Blood   Result Value Ref Range    Protime 20.2 (H) 12.1 - 14.7 Seconds    INR 1.71 (H) 0.90 - 1.10   aPTT    Collection Time: 10/20/24 11:10 AM    Specimen: Arm, Right; Blood   Result Value Ref Range    PTT 53.4 (H) 26.5 - 34.5 seconds   Lactic Acid, Plasma    Collection Time: 10/20/24 11:10 AM    Specimen: Arm, Right; Blood   Result Value Ref Range    Lactate 2.0 0.5 - 2.0 mmol/L   C-reactive Protein    Collection Time: 10/20/24 11:10 AM    Specimen: Arm, Right; Blood   Result Value Ref Range    C-Reactive Protein 12.93 (H) 0.00 - 0.50 mg/dL   Procalcitonin    Collection Time: 10/20/24 11:10 AM    Specimen: Arm, Right; Blood   Result Value Ref Range    Procalcitonin 0.08 0.00 - 0.25 ng/mL   CBC Auto Differential    Collection Time: 10/20/24 11:10 AM    Specimen: Arm, Right; Blood   Result Value Ref Range    WBC 14.19 (H) 3.40 - 10.80 10*3/mm3    RBC 3.44 (L) 4.14 - 5.80 10*6/mm3    Hemoglobin 9.0 (L) 13.0 - 17.7 g/dL    Hematocrit 29.9 (L) 37.5 - 51.0 %    MCV 86.9 79.0 - 97.0 fL    MCH 26.2 (L) 26.6 - 33.0 pg    MCHC 30.1 (L) 31.5 - 35.7 g/dL    RDW 18.1 (H) 12.3 - 15.4 %    RDW-SD 58.2 (H) 37.0 - 54.0 fl    MPV 8.1 6.0 - 12.0 fL    Platelets 433 140 - 450 10*3/mm3    Neutrophil % 89.2 (H) 42.7 - 76.0 %    Lymphocyte % 3.0 (L) 19.6 - 45.3 %    Monocyte % 6.6 5.0 - 12.0 %    Eosinophil % 0.1 (L) 0.3 - 6.2 %    Basophil % 0.1 0.0 - 1.5 %    Immature Grans % 1.0 (H) 0.0 - 0.5 %    Neutrophils, Absolute 12.65 (H) 1.70 - 7.00 10*3/mm3    Lymphocytes, Absolute 0.43 (L) 0.70 - 3.10 10*3/mm3    Monocytes, Absolute 0.93 (H) 0.10 - 0.90 10*3/mm3    Eosinophils, Absolute 0.02 0.00 - 0.40 10*3/mm3    Basophils, Absolute 0.02 0.00 - 0.20 10*3/mm3    Immature Grans, Absolute 0.14 (H) 0.00 - 0.05 10*3/mm3    nRBC 0.0 0.0 - 0.2 /100 WBC   High Sensitivity Troponin T    Collection Time: 10/20/24 11:10 AM     Specimen: Arm, Right; Blood   Result Value Ref Range    HS Troponin T 27 (H) <22 ng/L   BNP    Collection Time: 10/20/24 11:10 AM    Specimen: Arm, Right; Blood   Result Value Ref Range    proBNP 5,383.0 (H) 0.0 - 1,800.0 pg/mL   Green Top (Gel)    Collection Time: 10/20/24 11:10 AM   Result Value Ref Range    Extra Tube Hold for add-ons.    Lavender Top    Collection Time: 10/20/24 11:10 AM   Result Value Ref Range    Extra Tube hold for add-on    Gold Top - SST    Collection Time: 10/20/24 11:10 AM   Result Value Ref Range    Extra Tube Hold for add-ons.    Light Blue Top    Collection Time: 10/20/24 11:10 AM   Result Value Ref Range    Extra Tube Hold for add-ons.    Blood Gas, Arterial With Co-Ox    Collection Time: 10/20/24 11:12 AM    Specimen: Arterial Blood   Result Value Ref Range    Site Left Radial     Sean's Test Positive     pH, Arterial 7.430 7.350 - 7.450 pH units    pCO2, Arterial 38.0 35.0 - 45.0 mm Hg    pO2, Arterial 68.0 (L) 83.0 - 108.0 mm Hg    HCO3, Arterial 25.2 20.0 - 26.0 mmol/L    Base Excess, Arterial 0.9 0.0 - 2.0 mmol/L    O2 Saturation, Arterial 93.8 (L) 94.0 - 99.0 %    Hemoglobin, Blood Gas 9.2 (L) 14 - 18 g/dL    Hematocrit, Blood Gas 28.2 (L) 38.0 - 51.0 %    Oxyhemoglobin 92.1 (L) 94 - 99 %    Methemoglobin 0.20 0.00 - 3.00 %    Carboxyhemoglobin 1.6 0 - 5 %    A-a DO2 34.1 0.0 - 300.0 mmHg    CO2 Content 26.4 22 - 33 mmol/L    Barometric Pressure for Blood Gas 738 mmHg    Modality Room Air     FIO2 21 %    Ventilator Mode NA     Collected by [id number]     pH, Temp Corrected      pCO2, Temperature Corrected      pO2, Temperature Corrected     COVID-19, FLU A/B, RSV PCR 1 HR TAT - Swab, Nasopharynx    Collection Time: 10/20/24 11:30 AM    Specimen: Nasopharynx; Swab   Result Value Ref Range    COVID19 Not Detected Not Detected - Ref. Range    Influenza A PCR Not Detected Not Detected    Influenza B PCR Not Detected Not Detected    RSV, PCR Not Detected Not Detected   High Sensitivity  Troponin T 2Hr    Collection Time: 10/20/24  1:09 PM    Specimen: Arm, Left; Blood   Result Value Ref Range    HS Troponin T 29 (H) <22 ng/L    Troponin T Delta 2 >=-4 - <+4 ng/L   POC Glucose Once    Collection Time: 10/20/24  8:05 PM    Specimen: Blood   Result Value Ref Range    Glucose 58 (L) 70 - 130 mg/dL     CT Chest Without Contrast Diagnostic   Final Result       1.  Heterogeneous area of consolidation in the superior segment of the   left lower lobe with central cavitation consistent with pneumonia with   probable contribution from underlying mass lesion/neoplasm.   2.  Markedly enlarged mediastinal lymph nodes including subcarinal and   AP window lymph nodes with heterogeneous attenuation suggestive of   underlying metastatic disease and neoplastic involvement.   3.  Enlarged heart size.   4.  Small right pleural effusion.   5.  Moderate size left pleural effusion.   6.  Focal area of consolidation in the anterior aspect of the left upper   lobe with spiculated margins suspicious for additional mass lesion.   7.  Emphysema.   8.  No pneumothorax.   9.  Small left hepatic cyst.   10.  Degenerative disc disease throughout the thoracic spine with   endplate and facet hypertrophic changes.           This report was finalized on 10/20/2024 3:33 PM by Chico Modi MD.          XR Chest AP   Final Result   Impression:       1.  Enlarged heart size.   2.  Airspace disease in the left lower lobe and lower lingula.   3.  Small left pleural effusion.   4.  Sternotomy.   5.  Coarsened bronchovascular pattern to the lungs   6.  No pneumothorax.           This report was finalized on 10/20/2024 12:24 PM by Chico Modi MD.          CT Abdomen Pelvis Without Contrast   Final Result       1.  Mild enlarged heart size.   2.  Bilateral pleural effusions, left greater than right with some mild   compressive atelectasis at the lung bases.   3.  Normal appendix is well seen.   4.  Slightly ectatic infrarenal abdominal  aortic segment.   5.  Small left hepatic cyst.   6.  1 mm calcification in the lower pole of the left kidney.   7.  Degenerative disc disease throughout the lumbar spine with endplate   and facet hypertrophic changes.   8.  Fluid-filled distended bladder.   9.  Mild enlarged prostate gland.   10.  Mild colon and sigmoid colon diverticulosis.   11.  Mild constipation throughout the colon.   12.  No features of enterocolitis.   13.  No diverticulitis.   14.  No free fluid or free air.   15.  No abscess or hematoma.       This report was finalized on 10/20/2024 12:40 PM by Chico Modi MD.                Final diagnoses:   Pneumonia of left lung due to infectious organism, unspecified part of lung       ED Disposition  ED Disposition       ED Disposition   Decision to Admit    Condition   --    Comment   --               No follow-up provider specified.       Medication List      No changes were made to your prescriptions during this visit.            Mckinley Knox APRN  10/20/24 2022      Electronically signed by Mckinley Knox APRN at 10/20/24 2022       Facility-Administered Medications as of 10/22/2024   Medication Dose Route Frequency Provider Last Rate Last Admin    acetylcysteine (MUCOMYST) 20 % nebulizer solution 3 mL  3 mL Nebulization BID - RT Oculam, Nurys Fritz MD        apixaban (ELIQUIS) tablet 5 mg  5 mg Oral BID Dakota Bruce MD   5 mg at 10/22/24 0835    ascorbic acid (VITAMIN C) tablet 250 mg  250 mg Oral Daily Dakota Bruce MD   250 mg at 10/22/24 0836    atorvastatin (LIPITOR) tablet 20 mg  20 mg Oral Nightly Dakota Bruce MD   20 mg at 10/21/24 2024    sennosides-docusate (PERICOLACE) 8.6-50 MG per tablet 2 tablet  2 tablet Oral BID PRN Dakota Bruce MD        And    polyethylene glycol (MIRALAX) packet 17 g  17 g Oral Daily PRN Dakota Bruce MD        And    bisacodyl (DULCOLAX) EC tablet 5 mg  5 mg Oral Daily PRN Dakota Bruce MD        And    bisacodyl  (DULCOLAX) suppository 10 mg  10 mg Rectal Daily PRN Dakota Bruce MD        [COMPLETED] cefTRIAXone (ROCEPHIN) 1,000 mg in sodium chloride 0.9 % 100 mL IVPB-VTB  1,000 mg Intravenous Once Mckinley Knox APRN   Stopped at 10/20/24 1525    [COMPLETED] dextrose (D50W) (25 g/50 mL) IV injection 25 g  25 g Intravenous Once Mckinley Knox APRN   25 g at 10/20/24 2011    dextrose (D50W) (25 g/50 mL) IV injection 25 g  25 g Intravenous Q15 Min PRN Andrade Jensen APRN        dextrose (GLUTOSE) oral gel 15 g  15 g Oral Q15 Min PRN Andrade Jensen APRN        ferrous sulfate tablet 325 mg  325 mg Oral Daily With Breakfast Dakota Bruce MD   325 mg at 10/22/24 0838    folic acid (FOLVITE) tablet 1 mg  1 mg Oral Daily Dakota Bruce MD   1 mg at 10/22/24 0835    glucagon HCl (Diagnostic) injection 1 mg  1 mg Intramuscular Q15 Min PRN Andrade Jensen APRN        Insulin Lispro (humaLOG) injection 2-7 Units  2-7 Units Subcutaneous 4x Daily AC & at Bedtime Andrade Jensen APRN   3 Units at 10/22/24 0838    ipratropium-albuterol (DUO-NEB) nebulizer solution 3 mL  3 mL Nebulization TID - RT OculamNurys MD        [COMPLETED] ketorolac (TORADOL) injection 30 mg  30 mg Intravenous Once Andrade Jensen APRN   30 mg at 10/21/24 2111    melatonin tablet 5 mg  5 mg Oral Nightly PRN Dakota Bruce MD        pantoprazole (PROTONIX) EC tablet 40 mg  40 mg Oral Q AM Dakota Bruce MD   40 mg at 10/22/24 0553    polyvinyl alcohol (LIQUIFILM) 1.4 % ophthalmic solution 1 drop  1 drop Both Eyes TID PRN Dakota Bruce MD        Psyllium (METAMUCIL MULTIHEALTH FIBER) 51.7 % packet 1 packet  1 packet Oral Daily PRN Dakota Bruce MD        sertraline (ZOLOFT) tablet 100 mg  100 mg Oral Daily Dakota Bruce MD   100 mg at 10/22/24 0837    sodium chloride 0.9 % flush 10 mL  10 mL Intravenous Q12H Dakota Bruce MD   10 mL at 10/22/24 0839    sodium chloride 0.9 % flush 10 mL  10 mL Intravenous PRN Dakota Bruce,  MD        thiamine (VITAMIN B-1) tablet 200 mg  200 mg Oral Q8H Dakota Bruce MD   200 mg at 10/22/24 0553     Orders (all)        Start     Ordered    10/22/24 1900  acetylcysteine (MUCOMYST) 20 % nebulizer solution 3 mL  2 Times Daily - RT         10/22/24 0907    10/22/24 0930  Chest Physiotherapy (PD & P)  2 Times Daily - RT       10/22/24 0907    10/22/24 0915  ipratropium-albuterol (DUO-NEB) nebulizer solution 3 mL  3 Times Daily - RT         10/22/24 0907    10/22/24 0915  Inpatient Admission  Once         10/22/24 0914    10/22/24 0907  Respiratory Culture - Sputum, Cough  Once         10/22/24 0907    10/22/24 0642  POC Glucose Once  PROCEDURE ONCE        Comments: Complete no more than 45 minutes prior to patient eating      10/22/24 0634    10/22/24 0600  Basic Metabolic Panel  Morning Draw         10/21/24 1147    10/22/24 0600  CBC & Differential  Morning Draw         10/21/24 1147    10/22/24 0600  CBC Auto Differential  PROCEDURE ONCE         10/21/24 2202    10/22/24 0406  Skin Tear Care - Minimal Drainage Q3 Days  Every Third Day        Comments: - Realign Skin Flap (if Viable) Gently Ease Flap Into Place Using a Dampened Cotton-Tipped Applicator or Gloved Finger  - Gently Cleanse Area & Pat Dry  - Apply Hydrogel & Non-Adherent Layer (Silicone Sheet, Oil Emulsion Dressing or Vaseline Gauze)  - Secure With Silicone Border Dressing (Unless Skin Fragile)  - If Skin is Fragile Secure With Roll Gauze - Do NOT Put Tape Directly on Skin  - Change Every 3 Days & As Needed    10/22/24 0405    10/22/24 0406  Follow Pressure Ulcer Prevention Measures Policy  Continuous        Comments: Implement Appropriate Pressure Ulcer Prevention Measures  - Open Order Report to View Full Instructions  Enter Wound LDA & Document Assessment  Add Wound Care Plan  Add Patient Education Per Policy    10/22/24 0405    10/22/24 0246  POC Glucose Once  PROCEDURE ONCE        Comments: Complete no more than 45 minutes prior to  patient eating      10/22/24 0239    10/22/24 0129  POC Glucose Once  PROCEDURE ONCE        Comments: Complete no more than 45 minutes prior to patient eating      10/22/24 0122    10/21/24 2311  POC Glucose Once  PROCEDURE ONCE        Comments: Complete no more than 45 minutes prior to patient eating      10/21/24 2304    10/21/24 2145  ketorolac (TORADOL) injection 30 mg  Once         10/21/24 2057    10/21/24 2100  atorvastatin (LIPITOR) tablet 20 mg  Nightly         10/21/24 0530    10/21/24 1922  POC Glucose Once  PROCEDURE ONCE        Comments: Complete no more than 45 minutes prior to patient eating      10/21/24 1915    10/21/24 1800  Dietary Nutrition Supplements Magic Cup  Daily With Lunch & Dinner       10/21/24 1718    10/21/24 1702  POC Glucose Once  PROCEDURE ONCE        Comments: Complete no more than 45 minutes prior to patient eating      10/21/24 1655    10/21/24 1447  Diet: Regular/House, Fluid Restriction (240 mL/tray); 1000 mL/day; Texture: Soft to Chew (NDD 3); Soft to Chew: Whole Meat; Fluid Consistency: Nectar Thick  Diet Effective Now         10/21/24 1446    10/21/24 1417  POC Glucose Once  PROCEDURE ONCE        Comments: Complete no more than 45 minutes prior to patient eating      10/21/24 1410    10/21/24 1149  FL Video Swallow Single Contrast  1 Time Imaging         10/21/24 1148    10/21/24 1147  Inpatient Pulmonology Consult  Once        Specialty:  Pulmonary Disease  Provider:  Boyd Del Real MD    10/21/24 1147    10/21/24 1139  POC Glucose Once  PROCEDURE ONCE        Comments: Complete no more than 45 minutes prior to patient eating      10/21/24 1131    10/21/24 1136  SLP Consult: Eval & Treat Communication Disorder  Once         10/21/24 1135    10/21/24 0900  ascorbic acid (VITAMIN C) tablet 250 mg  Daily         10/21/24 0530    10/21/24 0900  folic acid (FOLVITE) tablet 1 mg  Daily         10/21/24 0530    10/21/24 0900  apixaban (ELIQUIS) tablet 5 mg  2 Times Daily          10/21/24 0530    10/21/24 0900  sertraline (ZOLOFT) tablet 100 mg  Daily         10/21/24 0530    10/21/24 0800  Oral Care  2 Times Daily       10/20/24 2158    10/21/24 0800  ferrous sulfate tablet 325 mg  Daily With Breakfast         10/21/24 0530    10/21/24 0642  POC Glucose Once  PROCEDURE ONCE        Comments: Complete no more than 45 minutes prior to patient eating      10/21/24 0624    10/21/24 0630  pantoprazole (PROTONIX) EC tablet 40 mg  Every Early Morning         10/21/24 0530    10/21/24 0630  thiamine (VITAMIN B-1) tablet 200 mg  Every 8 Hours         10/21/24 0531    10/21/24 0615  cefTRIAXone (ROCEPHIN) 1 g in sodium chloride 0.9 % 100 mL IVPB-VTB  Every 24 Hours,   Status:  Discontinued         10/21/24 0521    10/21/24 0615  doxycycline (VIBRAMYCIN) 100 mg in sodium chloride 0.9 % 100 mL IVPB-VTB  Every 12 Hours,   Status:  Discontinued         10/21/24 0521    10/21/24 0600  Comprehensive Metabolic Panel  Morning Draw         10/21/24 0024    10/21/24 0600  CBC & Differential  Morning Draw         10/21/24 0024    10/21/24 0600  CBC Auto Differential  PROCEDURE ONCE         10/21/24 0024    10/21/24 0549  Diet: Regular/House, Fluid Restriction (240 mL/tray); 1000 mL/day; Texture: Soft to Chew (NDD 3); Soft to Chew: Whole Meat; Fluid Consistency: Thin (IDDSI 0)  Diet Effective Now,   Status:  Canceled         10/21/24 0549    10/21/24 0529  Hemoglobin A1c  Once         10/21/24 0528    10/21/24 0529  TSH  Once         10/21/24 0528    10/21/24 0529  Magnesium  Once         10/21/24 0528    10/21/24 0525  Psyllium (METAMUCIL MULTIHEALTH FIBER) 51.7 % packet 1 packet  Daily PRN         10/21/24 0530    10/21/24 0525  melatonin tablet 5 mg  Nightly PRN         10/21/24 0530    10/21/24 0525  polyvinyl alcohol (LIQUIFILM) 1.4 % ophthalmic solution 1 drop  3 Times Daily PRN         10/21/24 0530    10/21/24 0521  Sepsis Fluid Exclusion: Blood Pressure Responded To Lesser Volume; Bolus Volume Given /  Ordered (mL): 0  Once         10/21/24 0521    10/21/24 0520  Respiratory Culture - Sputum, Cough  Once         10/21/24 0519    10/21/24 0335  POC Glucose Once  PROCEDURE ONCE        Comments: Complete no more than 45 minutes prior to patient eating      10/21/24 0327    10/21/24 0116  POC Glucose Once  PROCEDURE ONCE        Comments: Complete no more than 45 minutes prior to patient eating      10/21/24 0109    10/21/24 0000  Vital Signs  Every 6 Hours         10/20/24 2158    10/21/24 0000  Intake & Output  Every 6 Hours         10/20/24 2158    10/21/24 0000  Neuro Checks  Every 6 Hours         10/20/24 2158    10/20/24 2245  Insulin Lispro (humaLOG) injection 2-7 Units  4 Times Daily Before Meals & Nightly         10/20/24 2158    10/20/24 2245  sodium chloride 0.9 % flush 10 mL  Every 12 Hours Scheduled         10/20/24 2158    10/20/24 2216  POC Glucose Once  PROCEDURE ONCE        Comments: Complete no more than 45 minutes prior to patient eating      10/20/24 2207    10/20/24 2200  POC Glucose 4x Daily Before Meals & at Bedtime  4 Times Daily Before Meals & at Bedtime      Comments: Complete no more than 45 minutes prior to patient eating      10/20/24 2158    10/20/24 2159  Weigh Patient  Once         10/20/24 2158    10/20/24 2159  Notify PCP of Patient Admission  Once         10/20/24 2158    10/20/24 2159  Insert Peripheral IV  Once         10/20/24 2158    10/20/24 2159  Saline Lock & Maintain IV Access  Continuous         10/20/24 2158    10/20/24 2159  Place Sequential Compression Device  Once         10/20/24 2158    10/20/24 2159  Maintain Sequential Compression Device  Continuous         10/20/24 2158    10/20/24 2159  Activity - Strict Bed Rest  Until Discontinued         10/20/24 2158    10/20/24 2159  Daily Weights  Daily       10/20/24 2158    10/20/24 2159  POC Glucose Once  Once        Comments: Complete no more than 45 minutes prior to patient eating      10/20/24 2158    10/20/24 2159   "Diet: Regular/House; Texture: Soft to Chew (NDD 3); Soft to Chew: Whole Meat; Fluid Consistency: Thin (IDDSI 0)  Diet Effective Now,   Status:  Canceled         10/20/24 2158    10/20/24 2159  SLP Consult: Eval & Treat Swallow Disorder  Once         10/20/24 2158    10/20/24 2159  PT Consult: Eval & Treat Functional Mobility Below Baseline, Discharge Placement Assessment  Once         10/20/24 2158    10/20/24 2159  OT Consult: Eval & Treat ADL Performance Below Baseline, Discharge Placement Assessment  Once         10/20/24 2158    10/20/24 2159  Inpatient Case Management  Consult  Once        Provider:  (Not yet assigned)    10/20/24 2158    10/20/24 2158  sodium chloride 0.9 % flush 10 mL  As Needed         10/20/24 2158    10/20/24 2158  sennosides-docusate (PERICOLACE) 8.6-50 MG per tablet 2 tablet  2 Times Daily PRN        Placed in \"And\" Linked Group    10/20/24 2158    10/20/24 2158  polyethylene glycol (MIRALAX) packet 17 g  Daily PRN        Placed in \"And\" Linked Group    10/20/24 2158    10/20/24 2158  bisacodyl (DULCOLAX) EC tablet 5 mg  Daily PRN        Placed in \"And\" Linked Group    10/20/24 2158    10/20/24 2158  bisacodyl (DULCOLAX) suppository 10 mg  Daily PRN        Placed in \"And\" Linked Group    10/20/24 2158    10/20/24 2158  dextrose (GLUTOSE) oral gel 15 g  Every 15 Minutes PRN         10/20/24 2158    10/20/24 2158  dextrose (D50W) (25 g/50 mL) IV injection 25 g  Every 15 Minutes PRN         10/20/24 2158    10/20/24 2158  glucagon HCl (Diagnostic) injection 1 mg  Every 15 Minutes PRN         10/20/24 2158    10/20/24 2143  Code Status and Medical Interventions: CPR (Attempt to Resuscitate); Full Support  Continuous         10/20/24 2144    10/20/24 2111  POC Glucose Once  PROCEDURE ONCE        Comments: Complete no more than 45 minutes prior to patient eating      10/20/24 2103    10/20/24 2056  POC Glucose Once  Once        Comments: Complete no more than 45 minutes prior to " patient eating      10/20/24 2055    10/20/24 2038  Initiate Observation Status  Once         10/20/24 2038    10/20/24 2022  dextrose (D50W) (25 g/50 mL) IV injection 25 g  Once         10/20/24 2006    10/20/24 2013  POC Glucose Once  PROCEDURE ONCE        Comments: Complete no more than 45 minutes prior to patient eating      10/20/24 2005    10/20/24 2011  Diet: Regular/House; Fluid Consistency: Thin (IDDSI 0)  Diet Effective Now,   Status:  Canceled         10/20/24 2010    10/20/24 2000  POC Glucose Once  Once        Comments: Complete no more than 45 minutes prior to patient eating      10/20/24 1959    10/20/24 1428  cefTRIAXone (ROCEPHIN) 1,000 mg in sodium chloride 0.9 % 100 mL IVPB-VTB  Once         10/20/24 1412    10/20/24 1314  CT Chest Without Contrast Diagnostic  1 Time Imaging         10/20/24 1313    10/20/24 1310  High Sensitivity Troponin T 2Hr  PROCEDURE ONCE         10/20/24 1201    10/20/24 1138  CT Abdomen Pelvis Without Contrast  1 Time Imaging         10/20/24 1137    10/20/24 1114  Blood Gas, Arterial With Co-Ox  PROCEDURE ONCE         10/20/24 1112    10/20/24 1105  High Sensitivity Troponin T  STAT         10/20/24 1104    10/20/24 1105  BNP  STAT         10/20/24 1104    10/20/24 1105  ECG 12 Lead Dyspnea  STAT         10/20/24 1104    10/20/24 1104  [place] Draw  Once         10/20/24 1104    10/20/24 1104  Green Top (Gel)  PROCEDURE ONCE         10/20/24 1104    10/20/24 1104  Lavender Top  PROCEDURE ONCE         10/20/24 1104    10/20/24 1104  Gold Top - SST  PROCEDURE ONCE         10/20/24 1104    10/20/24 1104  Light Blue Top  PROCEDURE ONCE         10/20/24 1104    10/20/24 1100  COVID-19, FLU A/B, RSV PCR 1 HR TAT - Swab, Nasopharynx  Once         10/20/24 1059    10/20/24 1058  CBC & Differential  Once         10/20/24 1059    10/20/24 1058  Comprehensive Metabolic Panel  Once         10/20/24 1059    10/20/24 1058  Protime-INR  Once         10/20/24 1059    10/20/24 1058  aPTT   "Once         10/20/24 1059    10/20/24 1058  Blood Culture - Blood, Arm, Right  Once        Placed in \"And\" Linked Group    10/20/24 1059    10/20/24 1058  Blood Culture - Blood, Arm, Right  Once        Comments: From a different site than #1.     Placed in \"And\" Linked Group    10/20/24 1059    10/20/24 1058  Lactic Acid, Plasma  STAT         10/20/24 1059    10/20/24 1058  Blood Gas, Arterial With Co-Ox  STAT         10/20/24 1059    10/20/24 1058  C-reactive Protein  STAT         10/20/24 1059    10/20/24 1058  Procalcitonin  Once         10/20/24 1059    10/20/24 1058  XR Chest AP  1 Time Imaging         10/20/24 1059    10/20/24 1058  CBC Auto Differential  PROCEDURE ONCE         10/20/24 1059    Unscheduled  Follow Hypoglycemia Standing Orders For Blood Glucose <70 & Notify Provider of Treatment  As Needed      Comments: Follow Hypoglycemia Orders As Outlined in Process Instructions (Open Order Report to View Full Instructions)  Notify Provider Any Time Hypoglycemia Treatment is Administered    10/20/24 2158    Unscheduled  Skin Tear Care - Minimal Drainage PRN  As Needed      Comments: - Realign Skin Flap (if Viable) Gently Ease Flap Into Place Using a Dampened Cotton-Tipped Applicator or Gloved Finger  - Gently Cleanse Area & Pat Dry  - Apply Hydrogel & Non-Adherent Layer (Silicone Sheet, Oil Emulsion Dressing or Vaseline Gauze)  - Secure With Silicone Border Dressing (Unless Skin Fragile)  - If Skin is Fragile Secure With Roll Gauze - Do NOT Put Tape Directly on Skin  - Change Every 3 Days & As Needed    10/22/24 0405    --  polyvinyl alcohol (LIQUIFILM) 1.4 % ophthalmic solution  3 Times Daily PRN         10/20/24 2214    --  empagliflozin (JARDIANCE) 25 MG tablet tablet  Daily         10/20/24 2214    --  metFORMIN (GLUCOPHAGE) 1000 MG tablet  2 Times Daily With Meals         10/20/24 2214    --  sildenafil (VIAGRA) 100 MG tablet  Daily PRN         10/20/24 2214    --  lisinopril (PRINIVIL,ZESTRIL) 10 MG " tablet  Daily         10/20/24 2214    --  glipizide (GLUCOTROL) 10 MG tablet  2 Times Daily Before Meals         10/20/24 2214    --  omeprazole (priLOSEC) 20 MG capsule  Daily         10/20/24 2214    --  atorvastatin (LIPITOR) 40 MG tablet  Nightly         10/20/24 2214    --  Melatonin 3 MG capsule  Nightly PRN         10/20/24 2214    --  metoprolol succinate XL (TOPROL-XL) 100 MG 24 hr tablet  Daily         10/20/24 2214    --  ascorbic acid (VITAMIN C) 250 MG tablet  Daily         10/20/24 2214    --  ferrous gluconate (FERGON) 324 MG tablet  Daily With Breakfast         10/20/24 2214    --  folic acid (FOLVITE) 1 MG tablet  Daily         10/20/24 2214    --  furosemide (LASIX) 40 MG tablet  Daily         10/20/24 2214    --  polyethylene glycol (MiraLax Mix-In [place]) 17 g packet  Daily PRN         10/20/24 2214    --  senna 8.6 MG tablet  Daily PRN         10/20/24 2214    --  psyllium (METAMUCIL) 58.6 % packet  Daily PRN         10/20/24 2214    --  apixaban (ELIQUIS) 5 MG tablet tablet  2 Times Daily         10/20/24 2214    --  sertraline (ZOLOFT) 100 MG tablet  Daily         10/20/24 2214    --  levoFLOXacin (LEVAQUIN) 500 MG tablet  Daily         10/20/24 2214                     Physician Progress Notes (all)        Nurys Campos MD at 10/21/24 0819              [facility]     Patient Identification:  Name:  Tyler Andrade  Age:  84 y.o.  Sex:  male  :  1940  MRN:  [id number]  Visit Number:  [id number]  Primary Care Provider:  Provider, No Known    Length of stay:  0    Subjective: Patient seen and examined assisted by his nurse present inside the room.  Patient sitting at the edge of the bed, reports he has been getting very weak, noted patient whispers when he talks.  Patient stated he normally is able to talk but has lost his voice since  of this year.  He denies workup or explanation for his loss of voice.  Denies coughing when he swallows.   Patient receives radiation treatment for non-small cell lung cancer left side.    Chief Complaint: Generalized weakness  ----------------------------------------------------------------------------------------------------------------------  Current Hospital Meds:  apixaban, 5 mg, Oral, BID  ascorbic acid, 250 mg, Oral, Daily  atorvastatin, 20 mg, Oral, Nightly  cefTRIAXone, 1 g, Intravenous, Q24H  doxycycline, 100 mg, Intravenous, Q12H  ferrous sulfate, 325 mg, Oral, Daily With Breakfast  folic acid, 1 mg, Oral, Daily  insulin lispro, 2-7 Units, Subcutaneous, 4x Daily AC & at Bedtime  pantoprazole, 40 mg, Oral, Q AM  sertraline, 100 mg, Oral, Daily  sodium chloride, 10 mL, Intravenous, Q12H  vitamin B-1, 200 mg, Oral, Q8H         ----------------------------------------------------------------------------------------------------------------------  Vital Signs:  Temp:  [97.5 °F (36.4 °C)-98.7 °F (37.1 °C)] 98.6 °F (37 °C)  Heart Rate:  [76-96] 86  Resp:  [17-20] 20  BP: ()/(51-95) 119/66       Tele:       10/20/24  1058 10/20/24  2240 10/21/24  0500   Weight: 90.7 kg (200 lb) 86.5 kg (190 lb 11.2 oz) (Anthony Lopes) 86.5 kg (190 lb 11.2 oz)     Body mass index is 28.16 kg/m².    Intake/Output Summary (Last 24 hours) at 10/21/2024 0820  Last data filed at 10/21/2024 0354  Gross per 24 hour   Intake 350 ml   Output 500 ml   Net -150 ml     Diet: Regular/House, Fluid Restriction (240 mL/tray); 1000 mL/day; Texture: Soft to Chew (NDD 3); Soft to Chew: Whole Meat; Fluid Consistency: Thin (IDDSI 0)  ----------------------------------------------------------------------------------------------------------------------  Physical exam:  General: Sitting at the edge of the bed very pleasant awake and alert answers appropriately.    No respiratory distress.    Skin:  Skin is warm and dry. No rash noted. No pallor.    HENT: Multiple basal lesions including the nose and face.  Head:  Normocephalic and atraumatic.  Mouth:   Moist mucous membranes.    Eyes:  Conjunctivae and EOM are normal.  Pupils are equal, round, and reactive to light.  No scleral icterus.    Neck:  Neck supple.  No JVD present.  No lymphadenopathy  Pulmonary/Chest:  No respiratory distress, no wheezes, no crackles, with normal breath sounds and good air movement.  Cardiovascular:  Normal rate, regular rhythm and normal heart sounds with no murmur.  Abdominal:  Soft.  Bowel sounds are normal.  No distension and no tenderness.   Extremities:  No edema, no tenderness, and no deformity.  No red or swollen joints anywhere.  Strong pulses in all 4 extremities with no clubbing, no cyanosis, no edema.  Neurological:  Motor strength equal no obvious deficit, sensory grossly intact.   No cranial nerve deficit.  No tongue deviation.  No facial droop.  No slurred speech.    Genitourinary: No Santana catheter  Back:  ----------------------------------------------------------------------------------------------------------------------  ----------------------------------------------------------------------------------------------------------------------  Results from last 7 days   Lab Units 10/20/24  1309 10/20/24  1110   HSTROP T ng/L 29* 27*     Results from last 7 days   Lab Units 10/21/24  0104 10/20/24  1110   CRP mg/dL  --  12.93*   LACTATE mmol/L  --  2.0   WBC 10*3/mm3 11.81* 14.19*   HEMOGLOBIN g/dL 8.0* 9.0*   HEMATOCRIT % 26.9* 29.9*   MCV fL 87.1 86.9   MCHC g/dL 29.7* 30.1*   PLATELETS 10*3/mm3 386 433   INR   --  1.71*     Results from last 7 days   Lab Units 10/20/24  1112   PH, ARTERIAL pH units 7.430   PO2 ART mm Hg 68.0*   PCO2, ARTERIAL mm Hg 38.0   HCO3 ART mmol/L 25.2     Results from last 7 days   Lab Units 10/21/24  0104 10/20/24  1110   SODIUM mmol/L 128* 124*   POTASSIUM mmol/L 4.6 4.6   MAGNESIUM mg/dL 1.8  --    CHLORIDE mmol/L 93* 88*   CO2 mmol/L 26.6 25.5   BUN mg/dL 18 16   CREATININE mg/dL 1.07 0.93   CALCIUM mg/dL 10.0 10.3   GLUCOSE mg/dL 68 163*  "  ALBUMIN g/dL 2.7* 3.1*   BILIRUBIN mg/dL 0.2 0.3   ALK PHOS U/L 82 93   AST (SGOT) U/L 22 24   ALT (SGPT) U/L 24 28   Estimated Creatinine Clearance: 57 mL/min (by C-G formula based on SCr of 1.07 mg/dL).    No results found for: \"AMMONIA\"      No results found for: \"BLOODCX\"  No results found for: \"URINECX\"  No results found for: \"WOUNDCX\"  No results found for: \"STOOLCX\"    I have personally looked at the labs and they are summarized above.  ----------------------------------------------------------------------------------------------------------------------  Imaging Results (Last 24 Hours)       Procedure Component Value Units Date/Time    CT Chest Without Contrast Diagnostic [[id number]] Collected: 10/20/24 1528     Updated: 10/20/24 1535    Narrative:      PROCEDURE: CT of the chest performed without IV contrast on October 20, 2024. The examination was performed with 3 mm axial imaging and 3 mm  sagittal and coronal reconstruction images. The examination was  performed according to as low as reasonably achievable dose protocol.  Total . The examination was performed according to as low as  reasonably achievable dose protocol.     HISTORY: Shortness of breath.     COMPARISON: None.     FINDINGS:     Enlarged heart size  Moderate size left pleural effusion.  Small size right pleural effusion.  Emphysematous changes in the upper lobes.  Airspace consolidation with central cavitation in the superior segment  of the left lower lobe consistent with pneumonia.  Airspace consolidation and atelectasis and scarring also noted in the  lower lingula.  Contribution from underlying mass lesion and neoplasm in the left lower  lobe is not excluded.  Focal airspace consolidation with spiculated margins in the anterior  aspect of the left upper lobe adjacent to the pleural margin and seen  best on image 34, series 3.  Markedly enlarged mediastinal lymph nodes suspicious for metastatic  disease to the mediastinum. " Markedly enlarged AP window and subcarinal  and left hilar adenopathy.  Normal thyroid gland.  Sternotomy.  No pericardial effusion.  Coronary arterial vascular calcifications.  Moderate volume of dense calcified plaque along the aortic arch and  descending thoracic aortic segments.  Small cyst in the left hepatic lobe.       Impression:         1.  Heterogeneous area of consolidation in the superior segment of the  left lower lobe with central cavitation consistent with pneumonia with  probable contribution from underlying mass lesion/neoplasm.  2.  Markedly enlarged mediastinal lymph nodes including subcarinal and  AP window lymph nodes with heterogeneous attenuation suggestive of  underlying metastatic disease and neoplastic involvement.  3.  Enlarged heart size.  4.  Small right pleural effusion.  5.  Moderate size left pleural effusion.  6.  Focal area of consolidation in the anterior aspect of the left upper  lobe with spiculated margins suspicious for additional mass lesion.  7.  Emphysema.  8.  No pneumothorax.  9.  Small left hepatic cyst.  10.  Degenerative disc disease throughout the thoracic spine with  endplate and facet hypertrophic changes.        This report was finalized on 10/20/2024 3:33 PM by Chico Modi MD.       CT Abdomen Pelvis Without Contrast [[id number]] Collected: 10/20/24 1235     Updated: 10/20/24 1242    Narrative:      PROCEDURE: CT of the abdomen and pelvis performed without IV contrast on  October 20, 2024. The examination was performed with 4 mm axial imaging  and 4 mm sagittal and coronal reconstruction images. The examination was  performed according to as low as reasonably achievable dose protocol.     HISTORY: Abdominal pain.     COMPARISON: None.     FINDINGS:     Enlarged heart size.  Small size right pleural effusion.  Moderate size left pleural effusion.  Compressive atelectasis at the lung bases, left greater than right.  Sternotomy.  No pericardial effusion.  Mild  atelectasis also noted in the lower lingula.  No acute process seen in the liver, pancreas, adrenal glands, and  kidneys.  A normal appendix is well seen.  Mild constipation throughout the colon.  Mild enlarged prostate gland.  Mild osteoarthritis at the right and left hip joint.  Multilevel degenerative disc disease throughout the lumbar spine with  endplate and facet hypertrophic changes.  No free fluid or free air. No abscess or hematoma.  Small umbilical hernia contains only fat.  Small left hepatic cyst.  Mild ectatic infrarenal abdominal aorta up to 2.69 cm in diameter.  No retroperitoneal hemorrhage.  Renal vascular calcifications on the right and left side.  1 mm calcification at the lower pole of the left kidney seen best on  image 50, series 2 is nonobstructive.       Impression:         1.  Mild enlarged heart size.  2.  Bilateral pleural effusions, left greater than right with some mild  compressive atelectasis at the lung bases.  3.  Normal appendix is well seen.  4.  Slightly ectatic infrarenal abdominal aortic segment.  5.  Small left hepatic cyst.  6.  1 mm calcification in the lower pole of the left kidney.  7.  Degenerative disc disease throughout the lumbar spine with endplate  and facet hypertrophic changes.  8.  Fluid-filled distended bladder.  9.  Mild enlarged prostate gland.  10.  Mild colon and sigmoid colon diverticulosis.  11.  Mild constipation throughout the colon.  12.  No features of enterocolitis.  13.  No diverticulitis.  14.  No free fluid or free air.  15.  No abscess or hematoma.     This report was finalized on 10/20/2024 12:40 PM by Chico Modi MD.       XR Chest AP [[id number]] Collected: 10/20/24 1222     Updated: 10/20/24 1239    Narrative:      PROCEDURE: Chest x-ray examination performed on October 20, 2024. Single  view. Upright position.     HISTORY: Shortness of breath.     COMPARISON: None.     FINDINGS:     Enlarged heart size  Sternotomy.  Coarsened bronchovascular  pattern to the lungs.  Airspace consolidation in the left lower lobe and lower lingula  suggestive of underlying multifocal pneumonia.  No edema.  Small left pleural effusion.   No pneumothorax.       Impression:      Impression:     1.  Enlarged heart size.  2.  Airspace disease in the left lower lobe and lower lingula.  3.  Small left pleural effusion.  4.  Sternotomy.  5.  Coarsened bronchovascular pattern to the lungs  6.  No pneumothorax.        This report was finalized on 10/20/2024 12:24 PM by Chico Mdoi MD.             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    Continue antibiotic, monitor sodium, H&H monitoring, speech evaluation for hoarseness of voice, will request pulmonology input, Accu-Chek and sliding scale.  PT OT.    Disposition pending      Nurys Campos MD  10/21/24  08:20 EDT    Electronically signed by Nurys Campos MD at 10/21/24 1146          Consult Notes (all)        Boyd Del Real MD at 10/21/24 1244        Consult Orders    1. Inpatient Pulmonology Consult [[id number]] ordered by Nurys Campos MD at 10/21/24 1147                   Pulmonary and critical care consultation note  Referring Provider: dr Campos  Reason for Consultation: Abnormal CT chest      Chief complaint -shortness of breath      History of present illness: 84-year-old male with  past medical history significant for lung cancer, MI and diabetes mellitus presented to ER for evaluation of shortness of breath.  Shortness of breath has been present since last 1 month and has been progressively getting worse.  HPI from the time of admission reviewed.  On my assessment patient was on room air was going to go for a speech and swallow evaluation.    All the labs medications ins and outs and vital signs at time of admission reviewed.      Review of Systems  History obtained from chart review and the patient  General ROS: negative for - chills, fatigue or fever  Psychological ROS: negative for - anxiety or depression  ENT ROS: negative for - headaches, visual changes or vocal changes  Respiratory ROS: positive for - cough and shortness of breath  Cardiovascular ROS: no chest pain or dyspnea on exertion  Gastrointestinal ROS: no abdominal pain, change in bowel habits, or black or bloody stools  Musculoskeletal ROS: negative for - joint pain, joint stiffness or joint swelling  Neurological ROS: no TIA or stroke symptoms  Hematological: no bleeding  Skin: no bruises, no rash        History  History reviewed. No pertinent past medical history., History reviewed. No pertinent surgical history., History reviewed. No pertinent family history.,   Social History     Tobacco Use    Smoking status: Former     Current packs/day: 0.00     Types: Cigarettes     Quit date:      Years since quittin.8     Passive exposure: Past    Smokeless tobacco: Never   Vaping Use    Vaping status: Never Used   Substance Use Topics    Alcohol use: Never    Drug use: Never   ,   Medications Prior to Admission   Medication Sig Dispense Refill Last Dose/Taking    apixaban (ELIQUIS) 5 MG tablet tablet Take 1 tablet by mouth 2 (Two) Times a Day.   10/20/2024 Morning    ascorbic acid (VITAMIN C) 250 MG tablet Take 1 tablet by mouth Daily.   10/20/2024 Morning    atorvastatin (LIPITOR) 40 MG tablet Take 0.5 tablets by mouth Every  Night.   10/19/2024 Bedtime    empagliflozin (JARDIANCE) 25 MG tablet tablet Take 0.5 tablets by mouth Daily.   10/20/2024 Morning    ferrous gluconate (FERGON) 324 MG tablet Take 1 tablet by mouth Daily With Breakfast.   10/20/2024 Morning    folic acid (FOLVITE) 1 MG tablet Take 1 tablet by mouth Daily.   10/20/2024 Morning    furosemide (LASIX) 40 MG tablet Take 1 tablet by mouth Daily.   10/20/2024    glipizide (GLUCOTROL) 10 MG tablet Take 1.5 tablets by mouth 2 (Two) Times a Day Before Meals.   10/20/2024 Morning    levoFLOXacin (LEVAQUIN) 500 MG tablet Take 1 tablet by mouth Daily.   10/20/2024    lisinopril (PRINIVIL,ZESTRIL) 10 MG tablet Take 0.5 tablets by mouth Daily.   10/20/2024 Morning    metFORMIN (GLUCOPHAGE) 1000 MG tablet Take 1 tablet by mouth 2 (Two) Times a Day With Meals.   10/20/2024 Morning    metoprolol succinate XL (TOPROL-XL) 100 MG 24 hr tablet Take 0.5 tablets by mouth Daily.   10/20/2024    omeprazole (priLOSEC) 20 MG capsule Take 1 capsule by mouth Daily.   10/20/2024    sertraline (ZOLOFT) 100 MG tablet Take 1 tablet by mouth Daily.   10/20/2024    Melatonin 3 MG capsule Take 2 capsules by mouth At Night As Needed (Sleep).   Unknown    polyethylene glycol (MiraLax Mix-In [place]) 17 g packet Take 17 g by mouth Daily As Needed (Constipation).   Unknown    polyvinyl alcohol (LIQUIFILM) 1.4 % ophthalmic solution Administer 1 drop to both eyes 3 (Three) Times a Day As Needed for Dry Eyes.   Unknown    psyllium (METAMUCIL) 58.6 % packet Take 1 packet by mouth Daily As Needed (Constipation).   Unknown    senna 8.6 MG tablet Take 2 tablets by mouth Daily As Needed for Constipation.   Unknown    sildenafil (VIAGRA) 100 MG tablet Take 1 tablet by mouth Daily As Needed for Erectile Dysfunction.   Unknown   , Scheduled Meds:  apixaban, 5 mg, Oral, BID  ascorbic acid, 250 mg, Oral, Daily  atorvastatin, 20 mg, Oral, Nightly  cefTRIAXone, 1 g, Intravenous, Q24H  doxycycline, 100 mg, Intravenous,  Q12H  ferrous sulfate, 325 mg, Oral, Daily With Breakfast  folic acid, 1 mg, Oral, Daily  insulin lispro, 2-7 Units, Subcutaneous, 4x Daily AC & at Bedtime  pantoprazole, 40 mg, Oral, Q AM  sertraline, 100 mg, Oral, Daily  sodium chloride, 10 mL, Intravenous, Q12H  vitamin B-1, 200 mg, Oral, Q8H    , Continuous Infusions:    and Allergies:  Patient has no known allergies.    Objective     Vital Signs   Temp:  [97.8 °F (36.6 °C)-98.7 °F (37.1 °C)] 98.6 °F (37 °C)  Heart Rate:  [77-95] 86  Resp:  [17-20] 20  BP: ()/(51-95) 119/66    Physical Exam:             General- normal in appearance, not in any acute distress    HEENT- pupils equally reactive to light, normal in size, no scleral icterus    Neck-supple    Respiratory-respirations normal-on auscultation no wheezing no crackles,     Cardiovascular-  Normal S1 and S2. No S3, S4 or murmurs. No JVD, no carotid bruit and no edema, pulses normal bilaterally     GI-nontender nondistended bowel sounds positive    CNS-nonfocal    Musculoskeletal -no edema  Extremities- no obvious deformity noticed     Psychiatric-mood good, good eye contact, alert awake oriented  Skin-some healing spots on the face.  As per the patient he had skin cancer.                                                                  Results Review:    LABS:    Lab Results   Component Value Date    GLUCOSE 68 10/21/2024    BUN 18 10/21/2024    CREATININE 1.07 10/21/2024    EGFRIFNONA 45 (L) 05/22/2021    BCR 16.8 10/21/2024    CO2 26.6 10/21/2024    CALCIUM 10.0 10/21/2024    ALBUMIN 2.7 (L) 10/21/2024    AST 22 10/21/2024    ALT 24 10/21/2024    WBC 11.81 (H) 10/21/2024    HGB 8.0 (L) 10/21/2024    HCT 26.9 (L) 10/21/2024    MCV 87.1 10/21/2024     10/21/2024     (L) 10/21/2024    K 4.6 10/21/2024    CL 93 (L) 10/21/2024    ANIONGAP 8.4 10/21/2024       Lab Results   Component Value Date    INR 1.71 (H) 10/20/2024    INR 1.16 (H) 08/26/2024    INR 1.20 (H) 08/31/2023    PROTIME 20.2  (H) 10/20/2024    PROTIME 14.9 (H) 08/26/2024    PROTIME 15.8 (H) 08/31/2023       Results from last 7 days   Lab Units 10/20/24  1110   INR  1.71*   APTT seconds 53.4*          I reviewed the patient's new clinical results.  I reviewed the patient's new imaging results and agree with the interpretation.    Microbiology Results (last 10 days)       Procedure Component Value - Date/Time    Blood Culture - Blood, Arm, Right [[id number]]  (Normal) Collected: 10/20/24 1130    Lab Status: Preliminary result Specimen: Blood from Arm, Right Updated: 10/21/24 1145     Blood Culture No growth at 24 hours    COVID-19, FLU A/B, RSV PCR 1 HR TAT - Swab, Nasopharynx [[id number]]  (Normal) Collected: 10/20/24 1130    Lab Status: Final result Specimen: Swab from Nasopharynx Updated: 10/20/24 1220     COVID19 Not Detected     Influenza A PCR Not Detected     Influenza B PCR Not Detected     RSV, PCR Not Detected    Narrative:      Fact sheet for providers: https://www.fda.gov/media/433363/download    Fact sheet for patients: https://www.fda.gov/media/782983/download    Test performed by PCR.    Blood Culture - Blood, Arm, Right [[id number]]  (Normal) Collected: 10/20/24 1110    Lab Status: Preliminary result Specimen: Blood from Arm, Right Updated: 10/21/24 1145     Blood Culture No growth at 24 hours           Assessment & Plan         Abnormal CT chest-patient already has a diagnosis of lung cancer and will be going for chemo and radiation.    Procalitonin Results:      Lab 10/20/24  1110   PROCALCITONIN 0.08      Continue current antibiotics.    PT OT to avoid deconditioning    Speech and swallow to make sure patient is not aspirating.    Follow-up with hematology oncology on the outpatient basis for the treatment of squamous cell lung cancer.    Cardiology- hemodynamically -stable.  Continue current treatment  Continue to monitor HR- rate and rhythm, BP     Nephrology- Cr and BUN stable  I/O-ins and outs reviewed    GI-continue  current diet.    Hematology- CBC  Hb-transfuse for less than 7  platelet  WBC    ID-procalcitonin being normal.  Microbiology negative.  Can consider discontinuing antibiotics.    Endrocrinology-diabetes mellitus-maintain Blood sugar 140 -180    Electrolytes-   Mag and phos       DVT prophylaxis-continue        Family member present-none                Inability to walk          Boyd Del Real MD  10/21/24  12:44 EDT         Electronically signed by Boyd Del Real MD at 10/21/24 8037

## 2024-10-22 NOTE — PLAN OF CARE
Goal Outcome Evaluation:  Plan of Care Reviewed With: patient        Progress: no change  Outcome Evaluation: Patient resting in bed. VSS. Patient c/o pain this shift, PRN medications given per MAR. Patient voices no concerns at this time, will continue with POC.

## 2024-10-23 ENCOUNTER — APPOINTMENT (OUTPATIENT)
Dept: GENERAL RADIOLOGY | Facility: HOSPITAL | Age: 84
DRG: 178 | End: 2024-10-23
Payer: OTHER GOVERNMENT

## 2024-10-23 LAB
A-A DO2: 61.3 MMHG (ref 0–300)
ALBUMIN SERPL-MCNC: 2.9 G/DL (ref 3.5–5.2)
ALBUMIN/GLOB SERPL: 0.7 G/DL
ALP SERPL-CCNC: 84 U/L (ref 39–117)
ALT SERPL W P-5'-P-CCNC: 29 U/L (ref 1–41)
ANION GAP SERPL CALCULATED.3IONS-SCNC: 9.2 MMOL/L (ref 5–15)
ARTERIAL PATENCY WRIST A: POSITIVE
AST SERPL-CCNC: 25 U/L (ref 1–40)
ATMOSPHERIC PRESS: 729 MMHG
BASE EXCESS BLDA CALC-SCNC: 0.4 MMOL/L (ref 0–2)
BASOPHILS # BLD AUTO: 0.06 10*3/MM3 (ref 0–0.2)
BASOPHILS NFR BLD AUTO: 0.4 % (ref 0–1.5)
BDY SITE: ABNORMAL
BILIRUB SERPL-MCNC: 0.3 MG/DL (ref 0–1.2)
BUN SERPL-MCNC: 26 MG/DL (ref 8–23)
BUN/CREAT SERPL: 23.6 (ref 7–25)
CALCIUM SPEC-SCNC: 9.8 MG/DL (ref 8.6–10.5)
CHLORIDE SERPL-SCNC: 102 MMOL/L (ref 98–107)
CO2 BLDA-SCNC: 26.6 MMOL/L (ref 22–33)
CO2 SERPL-SCNC: 24.8 MMOL/L (ref 22–29)
COHGB MFR BLD: 1.5 % (ref 0–5)
CREAT SERPL-MCNC: 1.1 MG/DL (ref 0.76–1.27)
DEPRECATED RDW RBC AUTO: 60.7 FL (ref 37–54)
EGFRCR SERPLBLD CKD-EPI 2021: 66.6 ML/MIN/1.73
EOSINOPHIL # BLD AUTO: 0.15 10*3/MM3 (ref 0–0.4)
EOSINOPHIL NFR BLD AUTO: 1 % (ref 0.3–6.2)
ERYTHROCYTE [DISTWIDTH] IN BLOOD BY AUTOMATED COUNT: 18.6 % (ref 12.3–15.4)
GAS FLOW AIRWAY: 2 LPM
GLOBULIN UR ELPH-MCNC: 4.3 GM/DL
GLUCOSE BLDC GLUCOMTR-MCNC: 145 MG/DL (ref 70–130)
GLUCOSE BLDC GLUCOMTR-MCNC: 151 MG/DL (ref 70–130)
GLUCOSE BLDC GLUCOMTR-MCNC: 207 MG/DL (ref 70–130)
GLUCOSE BLDC GLUCOMTR-MCNC: 276 MG/DL (ref 70–130)
GLUCOSE BLDC GLUCOMTR-MCNC: 290 MG/DL (ref 70–130)
GLUCOSE BLDC GLUCOMTR-MCNC: 326 MG/DL (ref 70–130)
GLUCOSE SERPL-MCNC: 150 MG/DL (ref 65–99)
HCO3 BLDA-SCNC: 25.3 MMOL/L (ref 20–26)
HCT VFR BLD AUTO: 28.7 % (ref 37.5–51)
HCT VFR BLD CALC: 27.7 % (ref 38–51)
HGB BLD-MCNC: 8.6 G/DL (ref 13–17.7)
HGB BLDA-MCNC: 9 G/DL (ref 14–18)
IMM GRANULOCYTES # BLD AUTO: 0.12 10*3/MM3 (ref 0–0.05)
IMM GRANULOCYTES NFR BLD AUTO: 0.8 % (ref 0–0.5)
INHALED O2 CONCENTRATION: 28 %
LYMPHOCYTES # BLD AUTO: 0.82 10*3/MM3 (ref 0.7–3.1)
LYMPHOCYTES NFR BLD AUTO: 5.6 % (ref 19.6–45.3)
Lab: ABNORMAL
MCH RBC QN AUTO: 26.6 PG (ref 26.6–33)
MCHC RBC AUTO-ENTMCNC: 30 G/DL (ref 31.5–35.7)
MCV RBC AUTO: 88.9 FL (ref 79–97)
METHGB BLD QL: 0 % (ref 0–3)
MODALITY: ABNORMAL
MONOCYTES # BLD AUTO: 0.9 10*3/MM3 (ref 0.1–0.9)
MONOCYTES NFR BLD AUTO: 6.2 % (ref 5–12)
NEUTROPHILS NFR BLD AUTO: 12.49 10*3/MM3 (ref 1.7–7)
NEUTROPHILS NFR BLD AUTO: 86 % (ref 42.7–76)
NRBC BLD AUTO-RTO: 0 /100 WBC (ref 0–0.2)
OXYHGB MFR BLDV: 95.1 % (ref 94–99)
PCO2 BLDA: 41.6 MM HG (ref 35–45)
PCO2 TEMP ADJ BLD: ABNORMAL MM[HG]
PH BLDA: 7.39 PH UNITS (ref 7.35–7.45)
PH, TEMP CORRECTED: ABNORMAL
PLATELET # BLD AUTO: 403 10*3/MM3 (ref 140–450)
PMV BLD AUTO: 8.4 FL (ref 6–12)
PO2 BLDA: 83.3 MM HG (ref 83–108)
PO2 TEMP ADJ BLD: ABNORMAL MM[HG]
POTASSIUM SERPL-SCNC: 4.7 MMOL/L (ref 3.5–5.2)
PROT SERPL-MCNC: 7.2 G/DL (ref 6–8.5)
RBC # BLD AUTO: 3.23 10*6/MM3 (ref 4.14–5.8)
SAO2 % BLDCOA: 96.5 % (ref 94–99)
SODIUM SERPL-SCNC: 136 MMOL/L (ref 136–145)
VENTILATOR MODE: ABNORMAL
WBC NRBC COR # BLD AUTO: 14.54 10*3/MM3 (ref 3.4–10.8)

## 2024-10-23 PROCEDURE — 63710000001 INSULIN LISPRO (HUMAN) PER 5 UNITS

## 2024-10-23 PROCEDURE — 80053 COMPREHEN METABOLIC PANEL: CPT

## 2024-10-23 PROCEDURE — 25010000002 CEFEPIME PER 500 MG: Performed by: HOSPITALIST

## 2024-10-23 PROCEDURE — 99232 SBSQ HOSP IP/OBS MODERATE 35: CPT | Performed by: HOSPITALIST

## 2024-10-23 PROCEDURE — 25010000002 VANCOMYCIN 1 G RECONSTITUTED SOLUTION 1 EACH VIAL: Performed by: HOSPITALIST

## 2024-10-23 PROCEDURE — 94668 MNPJ CHEST WALL SBSQ: CPT

## 2024-10-23 PROCEDURE — 82805 BLOOD GASES W/O2 SATURATION: CPT

## 2024-10-23 PROCEDURE — 82375 ASSAY CARBOXYHB QUANT: CPT

## 2024-10-23 PROCEDURE — 36600 WITHDRAWAL OF ARTERIAL BLOOD: CPT

## 2024-10-23 PROCEDURE — 71045 X-RAY EXAM CHEST 1 VIEW: CPT

## 2024-10-23 PROCEDURE — 94799 UNLISTED PULMONARY SVC/PX: CPT

## 2024-10-23 PROCEDURE — 71045 X-RAY EXAM CHEST 1 VIEW: CPT | Performed by: RADIOLOGY

## 2024-10-23 PROCEDURE — 25010000002 METRONIDAZOLE 500 MG/100ML SOLUTION: Performed by: HOSPITALIST

## 2024-10-23 PROCEDURE — 94761 N-INVAS EAR/PLS OXIMETRY MLT: CPT

## 2024-10-23 PROCEDURE — 25810000003 SODIUM CHLORIDE 0.9 % SOLUTION 250 ML FLEX CONT: Performed by: HOSPITALIST

## 2024-10-23 PROCEDURE — 94664 DEMO&/EVAL PT USE INHALER: CPT

## 2024-10-23 PROCEDURE — 92526 ORAL FUNCTION THERAPY: CPT | Performed by: SPEECH-LANGUAGE PATHOLOGIST

## 2024-10-23 PROCEDURE — 85025 COMPLETE CBC W/AUTO DIFF WBC: CPT

## 2024-10-23 PROCEDURE — 82948 REAGENT STRIP/BLOOD GLUCOSE: CPT

## 2024-10-23 PROCEDURE — 83050 HGB METHEMOGLOBIN QUAN: CPT

## 2024-10-23 PROCEDURE — 99232 SBSQ HOSP IP/OBS MODERATE 35: CPT | Performed by: STUDENT IN AN ORGANIZED HEALTH CARE EDUCATION/TRAINING PROGRAM

## 2024-10-23 RX ORDER — FLUTICASONE PROPIONATE 50 MCG
2 SPRAY, SUSPENSION (ML) NASAL DAILY
Status: DISCONTINUED | OUTPATIENT
Start: 2024-10-24 | End: 2024-10-27 | Stop reason: HOSPADM

## 2024-10-23 RX ORDER — BENZONATATE 100 MG/1
100 CAPSULE ORAL 3 TIMES DAILY PRN
Status: DISCONTINUED | OUTPATIENT
Start: 2024-10-23 | End: 2024-10-27 | Stop reason: HOSPADM

## 2024-10-23 RX ADMIN — APIXABAN 5 MG: 5 TABLET, FILM COATED ORAL at 08:44

## 2024-10-23 RX ADMIN — OXYCODONE HYDROCHLORIDE AND ACETAMINOPHEN 250 MG: 500 TABLET ORAL at 08:44

## 2024-10-23 RX ADMIN — CEFEPIME 2000 MG: 2 INJECTION, POWDER, FOR SOLUTION INTRAVENOUS at 14:48

## 2024-10-23 RX ADMIN — VANCOMYCIN HYDROCHLORIDE 1000 MG: 1 INJECTION, POWDER, LYOPHILIZED, FOR SOLUTION INTRAVENOUS at 11:00

## 2024-10-23 RX ADMIN — SERTRALINE 100 MG: 50 TABLET, FILM COATED ORAL at 08:44

## 2024-10-23 RX ADMIN — METRONIDAZOLE 500 MG: 500 INJECTION, SOLUTION INTRAVENOUS at 04:12

## 2024-10-23 RX ADMIN — PANTOPRAZOLE SODIUM 40 MG: 40 TABLET, DELAYED RELEASE ORAL at 05:35

## 2024-10-23 RX ADMIN — FOLIC ACID 1 MG: 1 TABLET ORAL at 08:44

## 2024-10-23 RX ADMIN — METRONIDAZOLE 500 MG: 500 INJECTION, SOLUTION INTRAVENOUS at 20:39

## 2024-10-23 RX ADMIN — ACETYLCYSTEINE 3 ML: 200 SOLUTION ORAL; RESPIRATORY (INHALATION) at 18:59

## 2024-10-23 RX ADMIN — Medication 200 MG: at 14:49

## 2024-10-23 RX ADMIN — ATORVASTATIN CALCIUM 20 MG: 20 TABLET, FILM COATED ORAL at 20:39

## 2024-10-23 RX ADMIN — Medication 200 MG: at 22:13

## 2024-10-23 RX ADMIN — ACETYLCYSTEINE 3 ML: 200 SOLUTION ORAL; RESPIRATORY (INHALATION) at 07:11

## 2024-10-23 RX ADMIN — FERROUS SULFATE TAB 325 MG (65 MG ELEMENTAL FE) 325 MG: 325 (65 FE) TAB at 08:44

## 2024-10-23 RX ADMIN — GUAIFENESIN 200 MG: 200 SOLUTION ORAL at 04:58

## 2024-10-23 RX ADMIN — Medication 200 MG: at 05:35

## 2024-10-23 RX ADMIN — GUAIFENESIN 200 MG: 200 SOLUTION ORAL at 00:44

## 2024-10-23 RX ADMIN — Medication 10 ML: at 20:39

## 2024-10-23 RX ADMIN — CEFEPIME 2000 MG: 2 INJECTION, POWDER, FOR SOLUTION INTRAVENOUS at 00:44

## 2024-10-23 RX ADMIN — INSULIN LISPRO 4 UNITS: 100 INJECTION, SOLUTION INTRAVENOUS; SUBCUTANEOUS at 16:56

## 2024-10-23 RX ADMIN — IPRATROPIUM BROMIDE AND ALBUTEROL SULFATE 3 ML: 2.5; .5 SOLUTION RESPIRATORY (INHALATION) at 18:59

## 2024-10-23 RX ADMIN — METRONIDAZOLE 500 MG: 500 INJECTION, SOLUTION INTRAVENOUS at 11:07

## 2024-10-23 RX ADMIN — IPRATROPIUM BROMIDE AND ALBUTEROL SULFATE 3 ML: 2.5; .5 SOLUTION RESPIRATORY (INHALATION) at 13:30

## 2024-10-23 RX ADMIN — INSULIN LISPRO 3 UNITS: 100 INJECTION, SOLUTION INTRAVENOUS; SUBCUTANEOUS at 08:43

## 2024-10-23 RX ADMIN — INSULIN LISPRO 4 UNITS: 100 INJECTION, SOLUTION INTRAVENOUS; SUBCUTANEOUS at 11:07

## 2024-10-23 RX ADMIN — INSULIN LISPRO 5 UNITS: 100 INJECTION, SOLUTION INTRAVENOUS; SUBCUTANEOUS at 20:39

## 2024-10-23 RX ADMIN — IPRATROPIUM BROMIDE AND ALBUTEROL SULFATE 3 ML: 2.5; .5 SOLUTION RESPIRATORY (INHALATION) at 07:11

## 2024-10-23 RX ADMIN — APIXABAN 5 MG: 5 TABLET, FILM COATED ORAL at 20:39

## 2024-10-23 NOTE — SIGNIFICANT NOTE
10/23/24 1221   OTHER   Discipline occupational therapist   Rehab Time/Intention   Session Not Performed patient/family declined, not feeling well  (Patient became agitated when asked about therapy; Patient reported he was unable to participate. OT educated patient the importance of maintaining strength however patient continued to decline need.)

## 2024-10-23 NOTE — PROGRESS NOTES
Adult Nutrition  Assessment/PES    Patient Name:  Tyler Andrade  YOB: 1940  MRN: [id number]  Admit Date:  10/20/2024    Assessment Date:  10/23/2024    Comments:  follow-up    Po 71% ave of meals.    Encourage po and magic cups  Will cont to follow and monitor.      Reason for Assessment       Row Name 10/23/24 1639          Reason for Assessment    Reason For Assessment follow-up protocol  remote JONY Valles     Diagnosis other (see comments)  Debility, anemia, probably pnemonia hx lung ca, XRT, DM     Identified At Risk by Screening Criteria MST SCORE 2+                    Nutrition/Diet History       Row Name 10/23/24 1640          Nutrition/Diet History    Typical Intake (Food/Fluid/EN/PN) called to room no answer                    Labs/Tests/Procedures/Meds       Row Name 10/23/24 1640          Labs/Procedures/Meds    Lab Results Reviewed reviewed     Lab Results Comments glu 150-276        Diagnostic Tests/Procedures    Diagnostic Test/Procedure Reviewed reviewed        Medications    Pertinent Medications Reviewed reviewed     Pertinent Medications Comments vitamin C, humalog, thiamine, folic acid, iron                        Nutrition Prescription Ordered       Row Name 10/23/24 1640          Nutrition Prescription PO    Current PO Diet Soft Texture     Texture Whole foods     Fluid Consistency Nectar/syrup thick     Supplement Magic Cup     Supplement Frequency 2 times a day                    Evaluation of Received Nutrient/Fluid Intake       Row Name 10/23/24 1641          Fluid Intake Evaluation    Oral Fluid (mL) 560        PO Evaluation    Number of Meals 6     % PO Intake 71                       Problem/Interventions:   Problem 1       Row Name 10/23/24 1641          Nutrition Diagnoses Problem 1    Problem 1 Other (comment)  Inadequate energy intake related to cancer as evidenced by debility as evidenced by report po on admit     Resolved? Yes                          Intervention  Goal       Row Name 10/23/24 1641          Intervention Goal    General Meet nutritional needs for age/condition     PO Maintain intake;PO intake (%)     PO Intake % 75 %                    Nutrition Intervention       Row Name 10/23/24 1642          Nutrition Intervention    RD/Tech Action Follow Tx progress                      Education/Evaluation       Row Name 10/23/24 1642          Education    Education Education not appropriate at this time        Monitor/Evaluation    Monitor Per protocol;I&O;PO intake;Supplement intake;Pertinent labs;Weight                     Electronically signed by:  Sheila Deleon RD  10/23/24 16:42 EDT

## 2024-10-23 NOTE — PROGRESS NOTES
Shortness of Breath    Tyler Andrade is a 84 y.o. male who presents today to [address] for Shortness of Breath.    Subjective: No major acute events reported overnight.  Chief Complaint: Hypoxia.          VITALS:   Vitals:    10/23/24 0721   BP:    Pulse: 99   Resp: 20   Temp:    SpO2: 97%          CURRENT MEDICATIONS:   Current Facility-Administered Medications   Medication Dose Route Frequency Provider Last Rate Last Admin    acetylcysteine (MUCOMYST) 20 % nebulizer solution 3 mL  3 mL Nebulization BID - RT Nurys Campos MD   3 mL at 10/23/24 0711    apixaban (ELIQUIS) tablet 5 mg  5 mg Oral BID Dakota Bruce MD   5 mg at 10/23/24 0844    ascorbic acid (VITAMIN C) tablet 250 mg  250 mg Oral Daily Dakota Bruce MD   250 mg at 10/23/24 0844    atorvastatin (LIPITOR) tablet 20 mg  20 mg Oral Nightly Dakota Bruce MD   20 mg at 10/22/24 2144    sennosides-docusate (PERICOLACE) 8.6-50 MG per tablet 2 tablet  2 tablet Oral BID PRN Dakota Bruce MD        And    polyethylene glycol (MIRALAX) packet 17 g  17 g Oral Daily PRN Dakota Bruce MD        And    bisacodyl (DULCOLAX) EC tablet 5 mg  5 mg Oral Daily PRN Dakota Bruce MD        And    bisacodyl (DULCOLAX) suppository 10 mg  10 mg Rectal Daily PRN Daokta Bruce MD        cefepime 2000 mg IVPB in 100 mL NS (VTB)  2,000 mg Intravenous Q12H Nurys Campos MD   2,000 mg at 10/23/24 0044    dextrose (D50W) (25 g/50 mL) IV injection 25 g  25 g Intravenous Q15 Min PRN Andrade Jensen APRN        dextrose (GLUTOSE) oral gel 15 g  15 g Oral Q15 Min PRN Andrade Jensen APRN        ferrous sulfate tablet 325 mg  325 mg Oral Daily With Breakfast Dakota Bruce MD   325 mg at 10/23/24 0844    folic acid (FOLVITE) tablet 1 mg  1 mg Oral Daily Dakota Bruce MD   1 mg at 10/23/24 0844    glucagon HCl (Diagnostic) injection 1 mg  1 mg Intramuscular Q15 Min PRN Andrade Jensen APRN        guaifenesin (ROBITUSSIN) 100  MG/5ML liquid 200 mg  200 mg Oral Q4H PRN Andrade Jensen APRDMITRI   200 mg at 10/23/24 0458    Insulin Lispro (humaLOG) injection 2-7 Units  2-7 Units Subcutaneous 4x Daily AC & at Bedtime Andrade JensenTEOFILO   3 Units at 10/23/24 0843    ipratropium-albuterol (DUO-NEB) nebulizer solution 3 mL  3 mL Nebulization TID - RT Nurys Campos MD   3 mL at 10/23/24 0711    melatonin tablet 5 mg  5 mg Oral Nightly PRN Dakota Bruce MD        metroNIDAZOLE (FLAGYL) IVPB 500 mg  500 mg Intravenous Q8H Nurys Campos  mL/hr at 10/23/24 0412 500 mg at 10/23/24 0412    pantoprazole (PROTONIX) EC tablet 40 mg  40 mg Oral Q AM Dakota Bruce MD   40 mg at 10/23/24 0535    polyvinyl alcohol (LIQUIFILM) 1.4 % ophthalmic solution 1 drop  1 drop Both Eyes TID PRN Dakota Bruce MD        Psyllium (METAMUCIL MULTIHEALTH FIBER) 51.7 % packet 1 packet  1 packet Oral Daily PRN Dakota Bruce MD        sertraline (ZOLOFT) tablet 100 mg  100 mg Oral Daily Dakota Bruce MD   100 mg at 10/23/24 0844    sodium chloride 0.9 % flush 10 mL  10 mL Intravenous Q12H Dakota Bruce MD   10 mL at 10/22/24 2158    sodium chloride 0.9 % flush 10 mL  10 mL Intravenous PRN Dakota Bruce MD        thiamine (VITAMIN B-1) tablet 200 mg  200 mg Oral Q8H Dakota Bruce MD   200 mg at 10/23/24 0535    vancomycin (VANCOCIN) 1,000 mg in sodium chloride 0.9 % 250 mL IVPB-VTB  1,000 mg Intravenous Q18H Nurys Campos MD             GENERAL:AOx3  Damage to patient's nasal bridge facial area mostly secondary to previous RT or CT although no signs of infection apparent.  HEART: S1, S2   LUNGS: decrease air entry b/l, rales bilateral  ABDOMEN: soft NT/ND   EXTREMITIES: Minimal edema, no cyanosis    ALLERGIES:   No Known Allergies      XR Chest 1 View  Narrative: XR CHEST 1 VW-     CLINICAL INDICATION: Patient complaining of shortness of breath,  coughing up blood tinged sputum.; J18.9-Pneumonia, unspecified organism         COMPARISON: 10/20/2024     TECHNIQUE: Single frontal view of the chest.     FINDINGS:     LUNGS: Dense left-sided consolidation     HEART AND MEDIASTINUM: Heart and mediastinal contours are unremarkable        SKELETON: Bony and soft tissue structures are unremarkable.           Impression: Dense left-sided consolidation           This report was finalized on 10/23/2024 7:42 AM by Dr. David Lopez MD.       Results from last 7 days   Lab Units 10/23/24  0657 10/22/24  0153 10/21/24  0104   WBC 10*3/mm3 14.54* 11.45* 11.81*   HEMOGLOBIN g/dL 8.6* 8.1* 8.0*   HEMATOCRIT % 28.7* 27.1* 26.9*   PLATELETS 10*3/mm3 403 352 386      ASSESSMENT & PLAN :  ----------------------------------    IMPRESSION:  - NSCLC of the left lower lobe previous BERNIE s/p SBRT, currently enlarging lung mass and mediastinal adenopathy, being treated/rule out PNA    -Previous basal cell carcinoma of the tip of the nose/cheeks, history of squamous cell carcinoma right medial cheek/        PLAN:  Patient CT scan personally reviewed and compared to prior CT scans patient has distant lymphadenopathy, given patient's prior history of lung cancer with BERNIE s/p SBRT and facial/cutaneous cancers this is concerning for advancement of patient's malignancy.  No lung biopsy is indicated at this time.  Patient might benefit from a PET scan.  Patient is on Eliquis,  Patient has been started on empiric antibiotics for postobstructive pneumonia.  Will need to finish course of 5 to 7 days.  Vancomycin, cefepime and Flagyl.  MRSA nares was positive.  Although this is most likely representing advancement of patient's disease rather than pneumonia.    Patient has hoarseness of voice and this is concerning for possible involvement of vocal cords, recommend ENT evaluation with laryngoscopy.    Patient might benefit from chest percussion therapy 3 times daily to encourage secretion expectoration.    Patient has a complex medical pathologies might benefit from  multidisciplinary specialty meetings including ENT, palliative care, oncology for goals of care and plans of treatment.      Will sign off please call with any questions      This document has been electronically signed by Pietro Hilton MD   October 23, 2024 10:22 EDT    Dictated Utilizing Dragon Dictation: Part of this note may be an electronic transcription/translation of spoken language to printed text using the Dragon Dictation System.

## 2024-10-23 NOTE — PROGRESS NOTES
[facility] HOSPITALIST PROGRESS NOTE     Patient Identification:  Name:  Tyler Andrade  Age:  84 y.o.  Sex:  male  :  1940  MRN:  [id number]  Visit Number:  [id number]  Primary Care Provider:  Skyla Faustin MD    Length of stay:  1    Subjective: Patient seen and examined, currently eating lunch, he is on nectar thickened liquids.  He is getting chest percussions and Mucomyst neb treatment, reports still having difficulty with expectoration.  Chest x-ray persistent left-sided dense consolidation.  MRSA screening was positive, sputum culture pending.  Afebrile.  I was able to discuss with daughter yesterday and patient and daughter wish for patient to be transferred to [facility] for logistics access and specialty.  He is supposed to get radiation treatment today with Dr. Ovalles which I was able to talk on the phone and agree that patient needs to be transferred where he will have access to all specialty and his radiation therapy.    Chief Complaint: Coughing generalized weakness  ----------------------------------------------------------------------------------------------------------------------  Current Hospital Meds:  acetylcysteine, 3 mL, Nebulization, BID - RT  apixaban, 5 mg, Oral, BID  ascorbic acid, 250 mg, Oral, Daily  atorvastatin, 20 mg, Oral, Nightly  cefepime, 2,000 mg, Intravenous, Q12H  ferrous sulfate, 325 mg, Oral, Daily With Breakfast  folic acid, 1 mg, Oral, Daily  insulin lispro, 2-7 Units, Subcutaneous, 4x Daily AC & at Bedtime  ipratropium-albuterol, 3 mL, Nebulization, TID - RT  metroNIDAZOLE, 500 mg, Intravenous, Q8H  pantoprazole, 40 mg, Oral, Q AM  sertraline, 100 mg, Oral, Daily  sodium chloride, 10 mL, Intravenous, Q12H  vitamin B-1, 200 mg, Oral, Q8H  vancomycin, 1,000 mg, Intravenous, Q18H         ----------------------------------------------------------------------------------------------------------------------  Vital Signs:  Temp:  [97.8 °F  (36.6 °C)-98.8 °F (37.1 °C)] 97.8 °F (36.6 °C)  Heart Rate:  [] 104  Resp:  [16-22] 20  BP: (125-149)/(69-90) 129/69       Tele:       10/21/24  0500 10/22/24  0500 10/23/24  0500   Weight: 86.5 kg (190 lb 11.2 oz) 85.7 kg (189 lb) 88.8 kg (195 lb 11.2 oz)     Body mass index is 28.9 kg/m².    Intake/Output Summary (Last 24 hours) at 10/23/2024 0802  Last data filed at 10/23/2024 0500  Gross per 24 hour   Intake 1864 ml   Output 1750 ml   Net 114 ml     Diet: Regular/House, Fluid Restriction (240 mL/tray); 1000 mL/day; Texture: Soft to Chew (NDD 3); Soft to Chew: Whole Meat; Fluid Consistency: Nectar Thick  ----------------------------------------------------------------------------------------------------------------------  Physical exam:  General: Comfortable,awake, alert, oriented to self, place, and time, conversant by whispering, eating lunch, he appears to be in a better mood today and less frustrated.  Chronically ill-appearing.  No respiratory distress.    Skin:  Skin is warm and dry. No rash noted. No pallor.    HENT: Face is deformed with multiple superficial ulcers,  Mouth:  Moist mucous membranes.    Eyes:  Conjunctivae and EOM are normal.  Pupils are equal, round, and reactive to light.  No scleral icterus.    Neck:  Neck supple.  No JVD present.    Pulmonary/Chest: Coarse breath sounds on the left with inspiratory and expiratory wheezing polyphonic in nature, right is clear.   Cardiovascular:  Normal rate, regular rhythm and normal heart sounds with no murmur.  Abdominal:  Soft.  Bowel sounds are normal.  No distension and no tenderness.   Extremities:  No edema, no tenderness, and no deformity.  No red or swollen joints anywhere.  Strong pulses in all 4 extremities with no clubbing, no cyanosis, no edema.  Neurological:  Motor strength equal no obvious deficit, sensory grossly intact.   No cranial nerve deficit.  No tongue deviation.  No facial droop.  No slurred speech.    Genitourinary: No  "Santana catheter  Back:  ----------------------------------------------------------------------------------------------------------------------  ----------------------------------------------------------------------------------------------------------------------  Results from last 7 days   Lab Units 10/20/24  1309 10/20/24  1110   HSTROP T ng/L 29* 27*     Results from last 7 days   Lab Units 10/23/24  0657 10/22/24  0153 10/21/24  0104 10/20/24  1110   CRP mg/dL  --   --   --  12.93*   LACTATE mmol/L  --   --   --  2.0   WBC 10*3/mm3 14.54* 11.45* 11.81* 14.19*   HEMOGLOBIN g/dL 8.6* 8.1* 8.0* 9.0*   HEMATOCRIT % 28.7* 27.1* 26.9* 29.9*   MCV fL 88.9 88.3 87.1 86.9   MCHC g/dL 30.0* 29.9* 29.7* 30.1*   PLATELETS 10*3/mm3 403 352 386 433   INR   --   --   --  1.71*     Results from last 7 days   Lab Units 10/23/24  0651   PH, ARTERIAL pH units 7.393   PO2 ART mm Hg 83.3   PCO2, ARTERIAL mm Hg 41.6   HCO3 ART mmol/L 25.3     Results from last 7 days   Lab Units 10/23/24  0657 10/22/24  0154 10/21/24  0104 10/20/24  1110   SODIUM mmol/L 136 129* 128* 124*   POTASSIUM mmol/L 4.7 4.9 4.6 4.6   MAGNESIUM mg/dL  --   --  1.8  --    CHLORIDE mmol/L 102 96* 93* 88*   CO2 mmol/L 24.8 25.3 26.6 25.5   BUN mg/dL 26* 24* 18 16   CREATININE mg/dL 1.10 1.33* 1.07 0.93   CALCIUM mg/dL 9.8 9.9 10.0 10.3   GLUCOSE mg/dL 150* 150* 68 163*   ALBUMIN g/dL 2.9*  --  2.7* 3.1*   BILIRUBIN mg/dL 0.3  --  0.2 0.3   ALK PHOS U/L 84  --  82 93   AST (SGOT) U/L 25  --  22 24   ALT (SGPT) U/L 29  --  24 28   Estimated Creatinine Clearance: 56.1 mL/min (by C-G formula based on SCr of 1.1 mg/dL).    No results found for: \"AMMONIA\"      Blood Culture   Date Value Ref Range Status   10/20/2024 No growth at 2 days  Preliminary   10/20/2024 No growth at 2 days  Preliminary     No results found for: \"URINECX\"  No results found for: \"WOUNDCX\"  No results found for: \"STOOLCX\"    I have personally looked at the labs and they are summarized " above.  ----------------------------------------------------------------------------------------------------------------------  Imaging Results (Last 24 Hours)       Procedure Component Value Units Date/Time    XR Chest 1 View [[id number]] Collected: 10/23/24 0741     Updated: 10/23/24 0744    Narrative:      XR CHEST 1 VW-     CLINICAL INDICATION: Patient complaining of shortness of breath,  coughing up blood tinged sputum.; J18.9-Pneumonia, unspecified organism        COMPARISON: 10/20/2024     TECHNIQUE: Single frontal view of the chest.     FINDINGS:     LUNGS: Dense left-sided consolidation     HEART AND MEDIASTINUM: Heart and mediastinal contours are unremarkable        SKELETON: Bony and soft tissue structures are unremarkable.             Impression:      Dense left-sided consolidation           This report was finalized on 10/23/2024 7:42 AM by Dr. David Lopez MD.       Fiberoptic Endo (fees) [[id number]] Resulted: 10/22/24 1528     Updated: 10/22/24 1528    Narrative:      This procedure was auto-finalized with no dictation required.          ----------------------------------------------------------------------------------------------------------------------  Assessment and Plan:  -Probable post obstructive pneumonia left-sided versus aspiration pneumonia  -Small cell carcinoma of the lungs  -History of basal cell carcinoma  -History of MI  -History of retinal hemorrhage  -Debility with significant physical deconditioning  -History of paroxysmal atrial fibrillation  -History of essential hypertension  -Diabetes fairly controlled with polyneuropathy  -Dysphonia etiology unclear from cancer of the lungs probably versus secondary to previous exposure to radiation of the face for treatment of basal cell carcinoma    Continue vancomycin and cefepime and Flagyl, follow-up sputum culture, continue Mucomyst with chest percussion/speech therapy.  Neb treatment.  Check and sliding scale.  Trying to initiate  transfer at [facility].    Disposition: For possible transfer      Nurys Campos MD  10/23/24  08:02 EDT

## 2024-10-23 NOTE — CASE MANAGEMENT/SOCIAL WORK
Discharge Planning Assessment   [place]     Patient Name: Tyler Andrade  MRN: [id number]  Today's Date: 10/23/2024    Admit Date: 10/20/2024            Discharge Plan       Row Name 10/23/24 1706       Plan    Plan Pt is a . Pt lives at [address] [place] [address]. Pt lives alone and plans to return home at discharge. Pt does not utilize home health or dme services.Pt 's cousin to provide transportation at home. Pt would benefit from a straight cane or rolling walker at discharge. Pt does not have a preference of dme company. Dme will need to be pre[facility]approved by [facility]. Pt's [facility] SW will need notified at time of discharge, Pt's radiation will need arranged including his stay at [facility]. SS to follow.                    Winter Veloz, CYDNEYW

## 2024-10-23 NOTE — PLAN OF CARE
Goal Outcome Evaluation:  Plan of Care Reviewed With: patient        Progress: no change  Outcome Evaluation: Patient sitting on edge of bed at this time. A&O. VSS on 2L NC. No acute changes noted. Patient refused to work with PT this shift. Patient also refuses to ambulate with nursing staff d/t stated weakness. Patient c/o comnstipation, will given PRN bowel care meds per orders. No requests or complaints at this time. Will continue with POC.

## 2024-10-23 NOTE — SIGNIFICANT NOTE
10/23/24 1602   OTHER   Discipline physical therapy assistant   Rehab Time/Intention   Session Not Performed patient/family declined, not feeling well;other (see comments)  (Pt said he is unable to complete therapy while he is here, and does not wish to recieve PT/OT.)

## 2024-10-23 NOTE — PLAN OF CARE
Goal Outcome Evaluation:  Plan of Care Reviewed With: patient        Progress: no change  Outcome Evaluation: Patient resting in bed. VSS on 2L NC. PRN robitussin ordered and given this shift. Patient voices no concerns at this time, will continue with POC.

## 2024-10-23 NOTE — THERAPY TREATMENT NOTE
Acute Care - Speech Language Pathology   Swallow Treatment Note  Dennis     Patient Name: Tyler Andrade  : 1940  MRN: [id number]  Today's Date: 10/23/2024  Onset of Illness/Injury or Date of Surgery: 10/20/24     Referring Physician: Beth      Admit Date: 10/20/2024    Visit Dx:     ICD-10-CM ICD-9-CM   1. Pneumonia of left lung due to infectious organism, unspecified part of lung  J18.9 486     Patient Active Problem List   Diagnosis    Basal cell carcinoma    Idiopathic polypoidal choroidal vasculopathy    Lung mass    NSCLC of left lung    Macular degeneration    Old myocardial infarction    Personal history of other endocrine, nutritional and metabolic disease    Diabetic retinopathy    Retinal hemorrhage    Squamous cell lung cancer, left    Inability to walk    Aspiration pneumonia     History reviewed. No pertinent past medical history.  History reviewed. No pertinent surgical history.    DYSPHAGIA THERAPY PLAN OF CARE:     Tyler Andrade was seen at bedside this date in Rm 339-1. Pt sitting upright in bed for tx session. Pt observed on 2L O2 via NC.     Long Term Goal:  Patient will accept least restrictive diet tolerance w/o overt s/s aspiration.      Short Term Goals:  1. Patient will perform OROM/KELLY exercises x3 sets x10 reps w/ min cues.  *Pt completes OMEs x3 sets x12 reps.     2. Patient will perform resistive breathing exercises x7sets x7 reps w/resistance of 1-5 to improve respiratory support and control.   *deferred.   3. Patient will perform CTAR exercises sustained x3 sets x5 reps for 30+ seconds over 3 consecutive sessions.   *deferred.   4. Patient will perform CTAR exercises repetitive x3 sets x12 reps w/ mod cues.   *deferred.   5. Patient will perform compensatory techniques during meals w/ min cues.   *SLP d/w pt strategies to reduce risk of aspiration including explanation of thickened liquids and how to thicken to nectar consistency.    6. Patient will accept ice/chip water  per protocol between meals and medications for oral hydration/comfort with assistance.   *Pt educated on water protocol. Pt with teachback at 80%. Pt able to demonstrate small sips via straw with no overt s/s of aspiration evidenced. Pt with silent aspiration established with MBS 10/21/24.    7. Patient will participate in instrumental re-evaluation of swallowing fnx in 3-5 days, pending progress towards this poc.   *Pt with MBS 10/21/24  *Pt with FEES 10/22/24    SLP Recommendation and Plan   1. Continue POC.       Thank you for allowing me to participate in the care of your patient-  Celetsina Gallardo M.A., CCC-SLP     EDUCATION  The patient has been educated in the following areas:   Dysphagia (Swallowing Impairment) Oral Care/Hydration Modified Diet Instruction.      Time Calculation:    Time Calculation- SLP       Row Name 10/23/24 1057             Time Calculation- SLP    SLP - Next Appointment 10/24/24  -SS                User Key  (r) = Recorded By, (t) = Taken By, (c) = Cosigned By      Initials Name Provider Type     Celestina Gallardo MA,CCC-SLP Speech and Language Pathologist                  Therapy Charges for Today       Code Description Service Date Service Provider Modifiers Qty    19737554608 HC ST TREATMENT SWALLOW 2 10/22/2024 Celestina Gallardo MA,CCC-SLP GN 1    29202626753 HC ST TREATMENT SWALLOW 2 10/23/2024 Celestina Gallardo MA,CCC-SLP GN 1          Celestina Gallardo MA,CCC-SLP  10/23/2024

## 2024-10-24 LAB
BACTERIA SPEC RESP CULT: NORMAL
BACTERIA SPEC RESP CULT: NORMAL
GLUCOSE BLDC GLUCOMTR-MCNC: 182 MG/DL (ref 70–130)
GLUCOSE BLDC GLUCOMTR-MCNC: 232 MG/DL (ref 70–130)
GLUCOSE BLDC GLUCOMTR-MCNC: 272 MG/DL (ref 70–130)
GLUCOSE BLDC GLUCOMTR-MCNC: 286 MG/DL (ref 70–130)
GRAM STN SPEC: NORMAL
VANCOMYCIN TROUGH SERPL-MCNC: 9.1 MCG/ML (ref 5–20)

## 2024-10-24 PROCEDURE — 97530 THERAPEUTIC ACTIVITIES: CPT

## 2024-10-24 PROCEDURE — 25810000003 SODIUM CHLORIDE 0.9 % SOLUTION: Performed by: HOSPITALIST

## 2024-10-24 PROCEDURE — 94799 UNLISTED PULMONARY SVC/PX: CPT

## 2024-10-24 PROCEDURE — 25010000002 METRONIDAZOLE 500 MG/100ML SOLUTION: Performed by: HOSPITALIST

## 2024-10-24 PROCEDURE — 80202 ASSAY OF VANCOMYCIN: CPT

## 2024-10-24 PROCEDURE — 92526 ORAL FUNCTION THERAPY: CPT

## 2024-10-24 PROCEDURE — 94761 N-INVAS EAR/PLS OXIMETRY MLT: CPT

## 2024-10-24 PROCEDURE — 63710000001 INSULIN LISPRO (HUMAN) PER 5 UNITS

## 2024-10-24 PROCEDURE — 94664 DEMO&/EVAL PT USE INHALER: CPT

## 2024-10-24 PROCEDURE — 25010000002 CEFEPIME PER 500 MG: Performed by: HOSPITALIST

## 2024-10-24 PROCEDURE — 82948 REAGENT STRIP/BLOOD GLUCOSE: CPT

## 2024-10-24 PROCEDURE — 25010000002 VANCOMYCIN 5 G RECONSTITUTED SOLUTION: Performed by: HOSPITALIST

## 2024-10-24 PROCEDURE — 99232 SBSQ HOSP IP/OBS MODERATE 35: CPT | Performed by: HOSPITALIST

## 2024-10-24 RX ADMIN — METRONIDAZOLE 500 MG: 500 INJECTION, SOLUTION INTRAVENOUS at 21:27

## 2024-10-24 RX ADMIN — BENZONATATE 100 MG: 100 CAPSULE ORAL at 00:25

## 2024-10-24 RX ADMIN — Medication 10 ML: at 08:15

## 2024-10-24 RX ADMIN — CEFEPIME 2000 MG: 2 INJECTION, POWDER, FOR SOLUTION INTRAVENOUS at 00:34

## 2024-10-24 RX ADMIN — Medication 200 MG: at 14:26

## 2024-10-24 RX ADMIN — APIXABAN 5 MG: 5 TABLET, FILM COATED ORAL at 21:27

## 2024-10-24 RX ADMIN — INSULIN LISPRO 2 UNITS: 100 INJECTION, SOLUTION INTRAVENOUS; SUBCUTANEOUS at 08:13

## 2024-10-24 RX ADMIN — IPRATROPIUM BROMIDE 0.5 MG: 0.5 SOLUTION RESPIRATORY (INHALATION) at 23:04

## 2024-10-24 RX ADMIN — FERROUS SULFATE TAB 325 MG (65 MG ELEMENTAL FE) 325 MG: 325 (65 FE) TAB at 08:14

## 2024-10-24 RX ADMIN — IPRATROPIUM BROMIDE AND ALBUTEROL SULFATE 3 ML: 2.5; .5 SOLUTION RESPIRATORY (INHALATION) at 06:51

## 2024-10-24 RX ADMIN — METRONIDAZOLE 500 MG: 500 INJECTION, SOLUTION INTRAVENOUS at 03:50

## 2024-10-24 RX ADMIN — METRONIDAZOLE 500 MG: 500 INJECTION, SOLUTION INTRAVENOUS at 11:13

## 2024-10-24 RX ADMIN — ACETYLCYSTEINE 3 ML: 200 SOLUTION ORAL; RESPIRATORY (INHALATION) at 18:37

## 2024-10-24 RX ADMIN — GUAIFENESIN 200 MG: 200 SOLUTION ORAL at 00:25

## 2024-10-24 RX ADMIN — Medication 200 MG: at 06:08

## 2024-10-24 RX ADMIN — APIXABAN 5 MG: 5 TABLET, FILM COATED ORAL at 08:14

## 2024-10-24 RX ADMIN — CEFEPIME 2000 MG: 2 INJECTION, POWDER, FOR SOLUTION INTRAVENOUS at 12:32

## 2024-10-24 RX ADMIN — INSULIN LISPRO 3 UNITS: 100 INJECTION, SOLUTION INTRAVENOUS; SUBCUTANEOUS at 21:27

## 2024-10-24 RX ADMIN — FOLIC ACID 1 MG: 1 TABLET ORAL at 08:14

## 2024-10-24 RX ADMIN — ATORVASTATIN CALCIUM 20 MG: 20 TABLET, FILM COATED ORAL at 21:27

## 2024-10-24 RX ADMIN — Medication 200 MG: at 21:30

## 2024-10-24 RX ADMIN — VANCOMYCIN HYDROCHLORIDE 1250 MG: 5 INJECTION, POWDER, LYOPHILIZED, FOR SOLUTION INTRAVENOUS at 06:08

## 2024-10-24 RX ADMIN — INSULIN LISPRO 4 UNITS: 100 INJECTION, SOLUTION INTRAVENOUS; SUBCUTANEOUS at 17:12

## 2024-10-24 RX ADMIN — VANCOMYCIN HYDROCHLORIDE 1250 MG: 5 INJECTION, POWDER, LYOPHILIZED, FOR SOLUTION INTRAVENOUS at 23:03

## 2024-10-24 RX ADMIN — IPRATROPIUM BROMIDE AND ALBUTEROL SULFATE 3 ML: 2.5; .5 SOLUTION RESPIRATORY (INHALATION) at 18:37

## 2024-10-24 RX ADMIN — Medication 10 ML: at 21:28

## 2024-10-24 RX ADMIN — OXYCODONE HYDROCHLORIDE AND ACETAMINOPHEN 250 MG: 500 TABLET ORAL at 08:13

## 2024-10-24 RX ADMIN — PANTOPRAZOLE SODIUM 40 MG: 40 TABLET, DELAYED RELEASE ORAL at 06:08

## 2024-10-24 RX ADMIN — INSULIN LISPRO 4 UNITS: 100 INJECTION, SOLUTION INTRAVENOUS; SUBCUTANEOUS at 11:12

## 2024-10-24 RX ADMIN — FLUTICASONE PROPIONATE 2 SPRAY: 50 SPRAY, METERED NASAL at 08:14

## 2024-10-24 RX ADMIN — SERTRALINE 100 MG: 50 TABLET, FILM COATED ORAL at 08:14

## 2024-10-24 RX ADMIN — ACETYLCYSTEINE 3 ML: 200 SOLUTION ORAL; RESPIRATORY (INHALATION) at 06:51

## 2024-10-24 RX ADMIN — IPRATROPIUM BROMIDE AND ALBUTEROL SULFATE 3 ML: 2.5; .5 SOLUTION RESPIRATORY (INHALATION) at 12:24

## 2024-10-24 NOTE — PLAN OF CARE
Goal Outcome Evaluation:      Pt is resting in the bed. No acute change. VSS. Pt educated on the importance of eating. No signs of distress. Routine meds given. Tolerated meds. Will continue to monitor. Will continue plan of care.

## 2024-10-24 NOTE — CASE MANAGEMENT/SOCIAL WORK
Discharge Planning Assessment  MIKEL Collins     Patient Name: Tyler Andrade  MRN: [id number]  Today's Date: 10/24/2024    Admit Date: 10/20/2024       Discharge Plan       Row Name 10/24/24 1636       Plan    Plan Pt discussed in rounds on this date. Provider states he plans to transfer Pt to . SS to follow.                    CYDNEY RobersonW

## 2024-10-24 NOTE — PROGRESS NOTES
[facility] HOSPITALIST PROGRESS NOTE     Patient Identification:  Name:  Tyler Andrade  Age:  84 y.o.  Sex:  male  :  1940  MRN:  [id number]  Visit Number:  [id number]  Primary Care Provider:  Skyla Faustin MD    Length of stay:  2    Subjective: Patient seen and examined, patient reports that he is tired today, he did not eat well last night and this morning, yesterday lunchtime he ate pretty well, still reports having difficulty expectoration despite Mucomyst neb treatment and chest percussion and DuoNeb treatment.  Cultures pending, afebrile.  Patient appears to be poorly motivated and frustrated.    Chief Complaint: Cough and chest congestion  ----------------------------------------------------------------------------------------------------------------------  Current Hospital Meds:  acetylcysteine, 3 mL, Nebulization, BID - RT  apixaban, 5 mg, Oral, BID  ascorbic acid, 250 mg, Oral, Daily  atorvastatin, 20 mg, Oral, Nightly  cefepime, 2,000 mg, Intravenous, Q12H  ferrous sulfate, 325 mg, Oral, Daily With Breakfast  fluticasone, 2 spray, Each Nare, Daily  folic acid, 1 mg, Oral, Daily  insulin lispro, 2-7 Units, Subcutaneous, 4x Daily AC & at Bedtime  ipratropium-albuterol, 3 mL, Nebulization, TID - RT  metroNIDAZOLE, 500 mg, Intravenous, Q8H  pantoprazole, 40 mg, Oral, Q AM  sertraline, 100 mg, Oral, Daily  sodium chloride, 10 mL, Intravenous, Q12H  vitamin B-1, 200 mg, Oral, Q8H  vancomycin, 1,250 mg, Intravenous, Q18H         ----------------------------------------------------------------------------------------------------------------------  Vital Signs:  Temp:  [96.5 °F (35.8 °C)-97.8 °F (36.6 °C)] 97.5 °F (36.4 °C)  Heart Rate:  [] 102  Resp:  [18-22] 18  BP: (137-146)/(80-91) 137/84       Tele:       10/22/24  0500 10/23/24  0500 10/24/24  0500   Weight: 85.7 kg (189 lb) 88.8 kg (195 lb 11.2 oz) 86 kg (189 lb 11.2 oz)     Body mass index is 28.01  kg/m².    Intake/Output Summary (Last 24 hours) at 10/24/2024 1232  Last data filed at 10/24/2024 1212  Gross per 24 hour   Intake 1680.56 ml   Output 1925 ml   Net -244.44 ml     Diet: Regular/House, Fluid Restriction (240 mL/tray); 1000 mL/day; Texture: Soft to Chew (NDD 3); Soft to Chew: Whole Meat; Fluid Consistency: Nectar Thick  ----------------------------------------------------------------------------------------------------------------------  Physical exam:  General: Chronically ill-appearing, dysphonic,  No respiratory distress.    Skin:  Skin is warm and dry. No rash noted. No pallor.    HENT: Face is deformed with multiple superficial ulcers, head:  Normocephalic and atraumatic.  Mouth:  Moist mucous membranes.  No thrush  Eyes:  Conjunctivae and EOM are normal.  Pupils are equal, round, and reactive to light.  No scleral icterus.    Neck:  Neck supple.  No JVD present.    Pulmonary/Chest:  No respiratory distress, no wheezes, no crackles, with normal breath sounds and good air movement.  Cardiovascular:  Normal rate, regular rhythm and normal heart sounds with no murmur.  Abdominal:  Soft.  Bowel sounds are normal.  No distension and no tenderness.   Extremities:  No edema, no tenderness, and no deformity.  No red or swollen joints anywhere.  Strong pulses in all 4 extremities with no clubbing, no cyanosis, no edema.  Neurological:  Motor strength equal no obvious deficit, sensory grossly intact.   No cranial nerve deficit.  No tongue deviation.  No facial droop.  No slurred speech.    Genitourinary: No Santana catheter  Back:  ----------------------------------------------------------------------------------------------------------------------  ----------------------------------------------------------------------------------------------------------------------  Results from last 7 days   Lab Units 10/20/24  1309 10/20/24  1110   HSTROP T ng/L 29* 27*     Results from last 7 days   Lab Units  "10/23/24  0657 10/22/24  0153 10/21/24  0104 10/20/24  1110   CRP mg/dL  --   --   --  12.93*   LACTATE mmol/L  --   --   --  2.0   WBC 10*3/mm3 14.54* 11.45* 11.81* 14.19*   HEMOGLOBIN g/dL 8.6* 8.1* 8.0* 9.0*   HEMATOCRIT % 28.7* 27.1* 26.9* 29.9*   MCV fL 88.9 88.3 87.1 86.9   MCHC g/dL 30.0* 29.9* 29.7* 30.1*   PLATELETS 10*3/mm3 403 352 386 433   INR   --   --   --  1.71*     Results from last 7 days   Lab Units 10/23/24  0651   PH, ARTERIAL pH units 7.393   PO2 ART mm Hg 83.3   PCO2, ARTERIAL mm Hg 41.6   HCO3 ART mmol/L 25.3     Results from last 7 days   Lab Units 10/23/24  0657 10/22/24  0154 10/21/24  0104 10/20/24  1110   SODIUM mmol/L 136 129* 128* 124*   POTASSIUM mmol/L 4.7 4.9 4.6 4.6   MAGNESIUM mg/dL  --   --  1.8  --    CHLORIDE mmol/L 102 96* 93* 88*   CO2 mmol/L 24.8 25.3 26.6 25.5   BUN mg/dL 26* 24* 18 16   CREATININE mg/dL 1.10 1.33* 1.07 0.93   CALCIUM mg/dL 9.8 9.9 10.0 10.3   GLUCOSE mg/dL 150* 150* 68 163*   ALBUMIN g/dL 2.9*  --  2.7* 3.1*   BILIRUBIN mg/dL 0.3  --  0.2 0.3   ALK PHOS U/L 84  --  82 93   AST (SGOT) U/L 25  --  22 24   ALT (SGPT) U/L 29  --  24 28   Estimated Creatinine Clearance: 55.3 mL/min (by C-G formula based on SCr of 1.1 mg/dL).    No results found for: \"AMMONIA\"      Blood Culture   Date Value Ref Range Status   10/22/2024 No growth at 24 hours  Preliminary   10/22/2024 No growth at 24 hours  Preliminary   10/20/2024 No growth at 4 days  Preliminary   10/20/2024 No growth at 4 days  Preliminary     No results found for: \"URINECX\"  No results found for: \"WOUNDCX\"  No results found for: \"STOOLCX\"    I have personally looked at the labs and they are summarized above.  ----------------------------------------------------------------------------------------------------------------------  Imaging Results (Last 24 Hours)       ** No results found for the last 24 hours. **      "     ----------------------------------------------------------------------------------------------------------------------  Assessment and Plan:  -Significant physical deconditioning and debility  -Dysphonia etiology from tumor versus radiation (less likely)  -Silent aspiration secondary to left vocal cord paralysis  -Non-small cell CA of lung enlarging mass with mediastinal adenopathy rule out obstructive pneumonia  -History of basal cell carcinoma of the face that is posttreatment with radiation  -History of paroxysmal atrial fibrillation  -History of essential hypertension  -Diabetes with polyneuropathy Accu-Chek at goal  -History of MI in the past     was assisting with possible transfer, [facility] transfer [facility] needed, patient and daughter wanted to be transferred will call today.  Patient requires multiple specialty access including radiation oncology, pulmonology and ENT.  In the meantime continue Mucomyst, neb treatment, special precaution, chest percussion.      Disposition probable transfer [facility]      Nurys Campos MD  10/24/24  12:32 EDT

## 2024-10-24 NOTE — THERAPY TREATMENT NOTE
"Acute Care - Speech Language Pathology   Swallow Treatment Note  Dennis     Patient Name: Tyler Andrade  : 1940  MRN: [id number]  Today's Date: 10/24/2024  Onset of Illness/Injury or Date of Surgery: 10/20/24     Referring Physician: Beth      Admit Date: 10/20/2024    Visit Dx:     ICD-10-CM ICD-9-CM   1. Pneumonia of left lung due to infectious organism, unspecified part of lung  J18.9 486     Patient Active Problem List   Diagnosis    Basal cell carcinoma    Idiopathic polypoidal choroidal vasculopathy    Lung mass    NSCLC of left lung    Macular degeneration    Old myocardial infarction    Personal history of other endocrine, nutritional and metabolic disease    Diabetic retinopathy    Retinal hemorrhage    Squamous cell lung cancer, left    Inability to walk    Aspiration pneumonia     History reviewed. No pertinent past medical history.  History reviewed. No pertinent surgical history.    DYSPHAGIA THERAPY PLAN OF CARE:     Tyler Andrade was seen at bedside this date in Rm 339-1. Pt requested to remain reclined in bed for tx session. Pt observed on 2L O2 via NC.     Long Term Goal:  Patient will accept least restrictive diet tolerance w/o overt s/s aspiration.      Short Term Goals:  1. Patient will perform OROM/KELLY exercises x3 sets x10 reps w/ min cues.  *Pt completes OMEs x3 sets x10 reps.     2. Patient will perform resistive breathing exercises x7sets x7 reps w/resistance of 1-5 to improve respiratory support and control.   Resistive breather exercises via \"The Breather\" w/ resistance 3/2 (exhale/inhale) x4 sets x5 reps. Limited endurance noted. Breather left at pt bedside w/ pt name marked for independent use.     3. Patient will perform CTAR exercises sustained x3 sets x5 reps for 30+ seconds over 3 consecutive sessions.   *deferred.     4. Patient will perform CTAR exercises repetitive x3 sets x12 reps w/ mod cues.   *deferred.     5. Patient will perform compensatory techniques during " meals w/ min cues.   *SLP d/w pt strategies to reduce risk of aspiration including explanation of thickened liquids and how to thicken to nectar consistency.    6. Patient will accept ice/chip water per protocol between meals and medications for oral hydration/comfort with assistance.   *Pt educated on water protocol. Pt with silent aspiration established with MBS 10/21/24.    7. Patient will participate in instrumental re-evaluation of swallowing fnx in 3-5 days, pending progress towards this poc.   *Pt with MBS 10/21/24  *Pt with FEES 10/22/24    SLP Recommendation and Plan   1. Continue POC.       Thank you for allowing me to participate in the care of your patient-  Olivia Hernadez M.S., CCC-SLP       EDUCATION  The patient has been educated in the following areas:   Dysphagia (Swallowing Impairment) Oral Care/Hydration Modified Diet Instruction.      Time Calculation:    Time Calculation- SLP       Row Name 10/24/24 1406 10/24/24 1121          Time Calculation- SLP    SLP - Next Appointment 10/24/24  - 10/24/24  -               User Key  (r) = Recorded By, (t) = Taken By, (c) = Cosigned By      Initials Name Provider Type    Olivia Galindo MS CCC-SLP Speech and Language Pathologist                      Olivia Hernadez MS CCC-SLP  10/24/2024

## 2024-10-24 NOTE — THERAPY TREATMENT NOTE
"Acute Care - Physical Therapy Treatment Note   Dennis     Patient Name: Tyler Andrade  : 1940  MRN: [id number]  Today's Date: 10/24/2024   Onset of Illness/Injury or Date of Surgery: 10/20/24  Visit Dx:     ICD-10-CM ICD-9-CM   1. Pneumonia of left lung due to infectious organism, unspecified part of lung  J18.9 486     Patient Active Problem List   Diagnosis    Basal cell carcinoma    Idiopathic polypoidal choroidal vasculopathy    Lung mass    NSCLC of left lung    Macular degeneration    Old myocardial infarction    Personal history of other endocrine, nutritional and metabolic disease    Diabetic retinopathy    Retinal hemorrhage    Squamous cell lung cancer, left    Inability to walk    Aspiration pneumonia     History reviewed. No pertinent past medical history.  History reviewed. No pertinent surgical history.  PT Assessment (Last 12 Hours)       PT Evaluation and Treatment       Row Name 10/24/24 1346 10/24/24 1041       Physical Therapy Time and Intention    Subjective Information dyspnea;complains of  -AG complains of;dyspnea  -AG    Document Type therapy note (daily note)  -AG therapy note (daily note)  -AG    Mode of Treatment physical therapy  -AG physical therapy  -AG    Patient Effort fair  -AG fair  -AG    Symptoms Noted During/After Treatment -- --  improved symptoms/ decr SOA  -AG      Row Name 10/24/24 1346 10/24/24 1041       General Information    Patient Profile Reviewed yes  -AG yes  -AG    Patient Observations alert;poorly cooperative  -AG alert  -AG    Patient/Family/Caregiver Comments/Observations -- pt. supine on 2L O2 nc;  -AG    General Observations of Patient pt. supine on 2L O2 nc, states he \"can't breathe\" and does appear to be dyspneic at rest.  -AG --    Risks Reviewed patient:;increased discomfort;change in vital signs  -AG --      Row Name 10/24/24 1346          Living Environment    Current Living Arrangements home  -AG     People in Home alone  -AG     Primary Care " Provided by self  -AG       Row Name 10/24/24 1346          Pain    Pretreatment Pain Rating 0/10 - no pain  -AG       Row Name 10/24/24 1346          Cognition    Behavioral Issues (Cognition) --  pt. is irritable  -AG     Orientation Status (Cognition) oriented x 3  -AG     Follows Commands (Cognition) WFL  -AG     Personal Safety Interventions nonskid shoes/slippers when out of bed  -AG       Row Name 10/24/24 1346          Bed Mobility    Bed Mobility scooting/bridging;supine-sit  -AG     Scooting/Bridging Wellborn (Bed Mobility) standby assist;verbal cues  -AG     Supine-Sit Wellborn (Bed Mobility) verbal cues;nonverbal cues (demo/gesture);contact guard  -AG     Bed Mobility, Safety Issues decreased use of legs for bridging/pushing;decreased use of arms for pushing/pulling  -AG       Row Name 10/24/24 1346          Transfers    Comment, (Transfers) pt. declinces to attempt stand at bedside d/t dyspnea.  He is agreeable to sit EOB for prolonged time for PLB and percussion.  -AG       Row Name 10/24/24 1346          Gait/Stairs (Locomotion)    Patient was able to Ambulate no, other medical factors prevent ambulation  -AG     Reason Patient was unable to Ambulate Hypoxia/Respiratory Distress  -AG       Row Name 10/24/24 1346          Safety Issues/Impairments Affecting Functional Mobility    Impairments Affecting Function (Mobility) endurance/activity tolerance  -AG       Row Name 10/24/24 1346          Balance    Balance Assessment sitting static balance;sitting dynamic balance;sit to stand dynamic balance;standing static balance;standing dynamic balance  -AG     Static Sitting Balance independent  -AG     Position, Sitting Balance unsupported;sitting edge of bed  -AG       Row Name             Wound 10/22/24 0406 Left antecubital    Wound - Properties Group Placement Date: 10/22/24  -TH Placement Time: 0406 -TH Side: Left  -TH Location: antecubital  -TH Primary Wound Type: Skin tear  -TH    Retired  Wound - Properties Group Placement Date: 10/22/24  -TH Placement Time: 0406 -TH Side: Left  -TH Location: antecubital  -TH Primary Wound Type: Skin tear  -TH    Retired Wound - Properties Group Placement Date: 10/22/24  -TH Placement Time: 0406 -TH Side: Left  -TH Location: antecubital  -TH Primary Wound Type: Skin tear  -TH    Retired Wound - Properties Group Date first assessed: 10/22/24  -TH Time first assessed: 0406 -TH Side: Left  -TH Location: antecubital  -TH Primary Wound Type: Skin tear  -TH      Row Name 10/24/24 1346          Coping    Observed Emotional State frustrated  -AG     Verbalized Emotional State acceptance  -AG     Trust Relationship/Rapport care explained;choices provided;thoughts/feelings acknowledged  -AG     Family/Support Persons family;daughter  -AG     Involvement in Care not present at bedside  -AG     Family/Support System Care support provided;self-care encouraged  -AG       Row Name 10/24/24 1346          Plan of Care Review    Plan of Care Reviewed With patient  -AG     Outcome Evaluation PT treatment performed, pt. agreeable to minimal participation and is dyspneic at rest.  Notified RNJuana.  Pt CGA for supine>sit and remained sitting for prolonged time. PT performed percussion to hopefully faciliate productive cough.  SpO2 did rise to 95% once sitting.  Pt. is frustrated and irritable throughout treatment.  Not agreeable to attempt STS.  Will continue.  -AG       Row Name 10/24/24 1342          Vital Signs    Pre SpO2 (%) 85  -AG     O2 Delivery Pre Treatment supplemental O2  -AG     Intra SpO2 (%) 95  -AG     O2 Delivery Intra Treatment supplemental O2  -AG       Row Name 10/24/24 1341          Positioning and Restraints    Pre-Treatment Position in bed  -AG     Post Treatment Position bed  -AG     In Bed sitting EOB;call light within reach;encouraged to call for assist;with nsg  -AG       Row Name 10/24/24 1348          Therapy Plan Review/Discharge Plan (PT)    Therapy  Plan Review (PT) patient;participants included;evaluation/treatment results reviewed  -               User Key  (r) = Recorded By, (t) = Taken By, (c) = Cosigned By      Initials Name Provider Type     Winter Velarde, PT Physical Therapist    Aliyah Jerome RN Registered Nurse                    Physical Therapy Education       Title: PT OT SLP Therapies (In Progress)       Topic: Physical Therapy (In Progress)       Point: Mobility training (In Progress)       Learning Progress Summary            Patient Acceptance, E,D, NR by  at 10/24/2024 1041    Acceptance, E,TB, VU by  at 10/21/2024 1041                      Point: Home exercise program (In Progress)       Learning Progress Summary            Patient Acceptance, E,D, NR by  at 10/24/2024 1041    Acceptance, E,TB, VU by  at 10/21/2024 1041                      Point: Body mechanics (In Progress)       Learning Progress Summary            Patient Acceptance, E,D, NR by  at 10/24/2024 1041    Acceptance, E,TB, VU by  at 10/21/2024 1041                      Point: Precautions (In Progress)       Learning Progress Summary            Patient Acceptance, E,D, NR by  at 10/24/2024 1041    Acceptance, E,TB, VU by  at 10/21/2024 1041                                      User Key       Initials Effective Dates Name Provider Type Discipline     06/16/21 -  Winter Velarde, PT Physical Therapist PT     05/31/23 -  Rocky Thorpe PT Physical Therapist PT                  PT Recommendation and Plan     Plan of Care Reviewed With: patient  Outcome Evaluation: PT treatment performed, pt. agreeable to minimal participation and is dyspneic at rest.  Notified Juana HENRIQUEZ.  Pt CGA for supine>sit and remained sitting for prolonged time. PT performed percussion to hopefully faciliate productive cough.  SpO2 did rise to 95% once sitting.  Pt. is frustrated and irritable throughout treatment.  Not agreeable to attempt STS.  Will continue.       Time  Calculation:    PT Charges       Row Name 10/24/24 1346 10/24/24 1041          Time Calculation    PT Received On 10/24/24  -AG 10/24/24  -               User Key  (r) = Recorded By, (t) = Taken By, (c) = Cosigned By      Initials Name Provider Type    Winter Bonilla, PT Physical Therapist                  Therapy Charges for Today       Code Description Service Date Service Provider Modifiers Qty    63342219812 HC PT THERAPEUTIC ACT EA 15 MIN 10/24/2024 Winter Velarde, PT GP 2            PT G-Codes  AM-PAC 6 Clicks Score (PT): 14    Winter Velarde, PT  10/24/2024

## 2024-10-24 NOTE — PAYOR COMM NOTE
"CONTACT: AYAAN NAIK RN  UTILIZATION MANAGEMENT DEPT.  [facility]  [address] [id number]  PHONE: [phone]  FAX: [phone]        CLINICAL UPDATE    REF#[id number]       Albin Andrade (84 y.o. Male)       Date of Birth   1940    Social Security Number       Address   [address]  [facility], [place] [address]    Home Phone   [phone]    MRN   [id number]       Oriental orthodox   None    Marital Status   Single                            Admission Date   10/20/24    Admission Type   Emergency    Admitting Provider   Dakota Bruce MD    Attending Provider   Nurys Campos MD    Department, Room/Bed   [address] 5979/       Discharge Date       Discharge Disposition       Discharge Destination                                 Attending Provider: Nurys Campos MD    Allergies: No Known Allergies    Isolation: None   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)   Code Status: CPR    Ht: 175.3 cm (69\")   Wt: 86 kg (189 lb 11.2 oz)    Admission Cmt: None   Principal Problem: Inability to walk [R26.2]                   Active Insurance as of 10/20/2024       Primary Coverage       Payor Plan Insurance Group Employer/Plan Group    [employer]        Payor Plan Address Payor Plan Phone Number Payor Plan Fax Number Effective Dates    [address] [phone]  12/1/2022 - None Entered    [facility] [id number]         Subscriber Name Subscriber Birth Date Member ID       ALBIN ANDRADE 1940 [id number]               Secondary Coverage       Payor Plan Insurance Group Employer/Plan Group    [facility] [place] DEPT [id number]        Payor Plan Address Payor Plan Phone Number Payor Plan Fax Number Effective Dates    [facility] OFFICE OF COMMUNITY CARE [phone]  5/21/2021 - None Entered    [address] [address]       [place] [address]         Subscriber Name Subscriber Birth Date Member ID       ALBIN ANDRADE 1940 [id number]               Tertiary " Coverage       Payor Plan Insurance Group Employer/Plan Group    [employer] MEDICARE REPLACEMENT [employer] MED ADV PPO [id number]       Payor Plan Address Payor Plan Phone Number Payor Plan Fax Number Effective Dates    [address] [address] [phone]  4/1/2023 - None Entered    [facility] [id number]         Subscriber Name Subscriber Birth Date Member ID       ALBIN MOSS 1940 [id number]                     Emergency Contacts        (Rel.) Home Phone Work Phone Mobile Phone    PERRY MOSS (Daughter) [phone] -- --              Current Facility-Administered Medications   Medication Dose Route Frequency Provider Last Rate Last Admin    acetylcysteine (MUCOMYST) 20 % nebulizer solution 3 mL  3 mL Nebulization BID - RT Nurys Campos MD   3 mL at 10/24/24 0651    apixaban (ELIQUIS) tablet 5 mg  5 mg Oral BID Dakota Bruce MD   5 mg at 10/24/24 0814    ascorbic acid (VITAMIN C) tablet 250 mg  250 mg Oral Daily Dakota Bruce MD   250 mg at 10/24/24 0813    atorvastatin (LIPITOR) tablet 20 mg  20 mg Oral Nightly Dakota Bruce MD   20 mg at 10/23/24 2039    benzonatate (TESSALON) capsule 100 mg  100 mg Oral TID PRN Klarissa Thomas PA-C   100 mg at 10/24/24 0025    sennosides-docusate (PERICOLACE) 8.6-50 MG per tablet 2 tablet  2 tablet Oral BID PRN Dakota Bruce MD        And    polyethylene glycol (MIRALAX) packet 17 g  17 g Oral Daily PRN Dakota Bruce MD        And    bisacodyl (DULCOLAX) EC tablet 5 mg  5 mg Oral Daily PRN Dakota Bruce MD        And    bisacodyl (DULCOLAX) suppository 10 mg  10 mg Rectal Daily PRN Dakota Bruce MD        cefepime 2000 mg IVPB in 100 mL NS (VTB)  2,000 mg Intravenous Q12H Nurys Campos MD   2,000 mg at 10/24/24 0034    dextrose (D50W) (25 g/50 mL) IV injection 25 g  25 g Intravenous Q15 Min PRN Andrade Jensen APRN        dextrose (GLUTOSE) oral gel 15 g  15 g Oral Q15 Min PRN Andrade Jensen APRN        ferrous sulfate tablet 325  mg  325 mg Oral Daily With Breakfast Dakota Bruce MD   325 mg at 10/24/24 0814    fluticasone (FLONASE) 50 MCG/ACT nasal spray 2 spray  2 spray Each Nare Daily Klarissa Thomas PA-C   2 spray at 10/24/24 0814    folic acid (FOLVITE) tablet 1 mg  1 mg Oral Daily Dakota Bruce MD   1 mg at 10/24/24 0814    glucagon HCl (Diagnostic) injection 1 mg  1 mg Intramuscular Q15 Min PRN Andrade Jensen APRN        guaifenesin (ROBITUSSIN) 100 MG/5ML liquid 200 mg  200 mg Oral Q4H PRN Andrade Jensen APRN   200 mg at 10/24/24 0025    Insulin Lispro (humaLOG) injection 2-7 Units  2-7 Units Subcutaneous 4x Daily AC & at Bedtime Andrade Jensen APRN   2 Units at 10/24/24 0813    ipratropium-albuterol (DUO-NEB) nebulizer solution 3 mL  3 mL Nebulization TID - RT Nurys Campos MD   3 mL at 10/24/24 0651    melatonin tablet 5 mg  5 mg Oral Nightly PRN Dakota Bruce MD        metroNIDAZOLE (FLAGYL) IVPB 500 mg  500 mg Intravenous Q8H Nurys Campos  mL/hr at 10/24/24 0350 500 mg at 10/24/24 0350    pantoprazole (PROTONIX) EC tablet 40 mg  40 mg Oral Q AM Dakota Bruce MD   40 mg at 10/24/24 0608    polyvinyl alcohol (LIQUIFILM) 1.4 % ophthalmic solution 1 drop  1 drop Both Eyes TID PRN Dakota Bruce MD        Psyllium (METAMUCIL MULTIHEALTH FIBER) 51.7 % packet 1 packet  1 packet Oral Daily PRN Dakota Bruce MD        sertraline (ZOLOFT) tablet 100 mg  100 mg Oral Daily Dakota Bruce MD   100 mg at 10/24/24 0814    sodium chloride 0.9 % flush 10 mL  10 mL Intravenous Q12H Dakota Bruce MD   10 mL at 10/24/24 0815    sodium chloride 0.9 % flush 10 mL  10 mL Intravenous PRN Dakota Bruce MD        thiamine (VITAMIN B-1) tablet 200 mg  200 mg Oral Q8H Dakota Bruce MD   200 mg at 10/24/24 0608    vancomycin 1250 mg/250 mL 0.9% NS IVPB ([facility])  1,250 mg Intravenous Q18H Nurys Campos MD   1,250 mg at 10/24/24 0608     Orders (last 48 hrs)        Start     Ordered    10/24/24  0900  fluticasone (FLONASE) 50 MCG/ACT nasal spray 2 spray  Daily         10/23/24 2255    10/24/24 0710  POC Glucose Once  PROCEDURE ONCE        Comments: Complete no more than 45 minutes prior to patient eating      10/24/24 0703    10/24/24 0530  vancomycin 1250 mg/250 mL 0.9% NS IVPB ([facility])  Every 18 Hours         10/24/24 0455    10/24/24 0400  Vancomycin, Trough  Timed         10/23/24 1444    10/23/24 2255  benzonatate (TESSALON) capsule 100 mg  3 Times Daily PRN         10/23/24 2255    10/1940  POC Glucose Once  PROCEDURE ONCE        Comments: Complete no more than 45 minutes prior to patient eating      10/23/24 1934    10/23/24 1652  POC Glucose Once  PROCEDURE ONCE        Comments: Complete no more than 45 minutes prior to patient eating      10/23/24 1645    10/23/24 1105  POC Glucose Once  PROCEDURE ONCE        Comments: Complete no more than 45 minutes prior to patient eating      10/23/24 1058    10/23/24 1100  vancomycin (VANCOCIN) 1,000 mg in sodium chloride 0.9 % 250 mL IVPB-VTB  Every 18 Hours,   Status:  Discontinued         10/22/24 1516    10/23/24 0657  Blood Gas, Arterial With Co-Ox  PROCEDURE ONCE         10/23/24 0651    10/23/24 0626  POC Glucose Once  PROCEDURE ONCE        Comments: Complete no more than 45 minutes prior to patient eating      10/23/24 0619    10/23/24 0625  XR Chest 1 View  1 Time Imaging         10/23/24 0626    10/23/24 0624  CBC & Differential  STAT         10/23/24 0626    10/23/24 0624  Comprehensive Metabolic Panel  STAT         10/23/24 0626    10/23/24 0624  CBC Auto Differential  PROCEDURE ONCE         10/23/24 0626    10/23/24 0623  Blood Gas, Arterial -With Co-Ox Panel: Yes  STAT         10/23/24 0626    10/23/24 0543  Continuous Pulse Oximetry  Continuous         10/23/24 0543    10/23/24 0423  POC Glucose Once  PROCEDURE ONCE        Comments: Complete no more than 45 minutes prior to patient eating      10/23/24 0415    10/23/24 0101  POC Glucose Once   "PROCEDURE ONCE        Comments: Complete no more than 45 minutes prior to patient eating      10/23/24 0054    10/23/24 0100  cefepime 2000 mg IVPB in 100 mL NS (VTB)  Every 12 Hours         10/22/24 1125    10/22/24 2334  guaifenesin (ROBITUSSIN) 100 MG/5ML liquid 200 mg  Every 4 Hours PRN         10/22/24 2334    10/22/24 1955  POC Glucose Once  PROCEDURE ONCE        Comments: Complete no more than 45 minutes prior to patient eating      10/22/24 1948    10/22/24 1900  acetylcysteine (MUCOMYST) 20 % nebulizer solution 3 mL  2 Times Daily - RT         10/22/24 0907    10/22/24 1845  POC Glucose Once  PROCEDURE ONCE        Comments: Complete no more than 45 minutes prior to patient eating      10/22/24 1838    10/22/24 1700  vancomycin 1750 mg/500 mL 0.9% NS IVPB ([facility])  Once         10/22/24 1509    10/22/24 1637  POC Glucose Once  PROCEDURE ONCE        Comments: Complete no more than 45 minutes prior to patient eating      10/22/24 1629    10/22/24 1545  Pharmacy Consult - Pharmacy to dose  Continuous,   Status:  Discontinued         10/22/24 1445    10/22/24 1429  Fiberoptic Endo (fees)  1 Time Imaging         10/22/24 1430    10/22/24 1300  cefepime 2000 mg IVPB in 100 mL NS (VTB)  Once         10/22/24 1125    10/22/24 1200  metroNIDAZOLE (FLAGYL) IVPB 500 mg  Every 8 Hours         10/22/24 1105    10/22/24 1145  Pharmacy Consult - Pharmacy to dose  Continuous,   Status:  Discontinued         10/22/24 1047    10/22/24 1106  Blood Culture - Blood, Arm, Right  Once        Placed in \"And\" Linked Group    10/22/24 1105    10/22/24 1106  Blood Culture - Blood, Arm, Left  Once        Placed in \"And\" Linked Group    10/22/24 1105    10/22/24 1049  MRSA Screen, PCR (Inpatient) - Swab, Nares  Once         10/22/24 1048    10/22/24 1046  POC Glucose Once  PROCEDURE ONCE        Comments: Complete no more than 45 minutes prior to patient eating      10/22/24 1039    10/22/24 0930  Chest Physiotherapy (PD & P)  2 Times Daily " - RT       10/22/24 0907    10/22/24 0915  ipratropium-albuterol (DUO-NEB) nebulizer solution 3 mL  3 Times Daily - RT         10/22/24 0907    10/22/24 0915  Inpatient Admission  Once         10/22/24 0914    10/22/24 0907  Respiratory Culture - Sputum, Cough  Once         10/22/24 0907    10/21/24 2100  atorvastatin (LIPITOR) tablet 20 mg  Nightly         10/21/24 0530    10/21/24 1800  Dietary Nutrition Supplements Magic Cup  Daily With Lunch & Dinner       10/21/24 1718    10/21/24 0900  ascorbic acid (VITAMIN C) tablet 250 mg  Daily         10/21/24 0530    10/21/24 0900  folic acid (FOLVITE) tablet 1 mg  Daily         10/21/24 0530    10/21/24 0900  apixaban (ELIQUIS) tablet 5 mg  2 Times Daily         10/21/24 0530    10/21/24 0900  sertraline (ZOLOFT) tablet 100 mg  Daily         10/21/24 0530    10/21/24 0800  Oral Care  2 Times Daily       10/20/24 2158    10/21/24 0800  ferrous sulfate tablet 325 mg  Daily With Breakfast         10/21/24 0530    10/21/24 0630  pantoprazole (PROTONIX) EC tablet 40 mg  Every Early Morning         10/21/24 0530    10/21/24 0630  thiamine (VITAMIN B-1) tablet 200 mg  Every 8 Hours         10/21/24 0531    10/21/24 0525  Psyllium (METAMUCIL MULTIHEALTH FIBER) 51.7 % packet 1 packet  Daily PRN         10/21/24 0530    10/21/24 0525  melatonin tablet 5 mg  Nightly PRN         10/21/24 0530    10/21/24 0525  polyvinyl alcohol (LIQUIFILM) 1.4 % ophthalmic solution 1 drop  3 Times Daily PRN         10/21/24 0530    10/20/24 2245  Insulin Lispro (humaLOG) injection 2-7 Units  4 Times Daily Before Meals & Nightly         10/20/24 2158    10/20/24 2245  sodium chloride 0.9 % flush 10 mL  Every 12 Hours Scheduled         10/20/24 2158    10/20/24 2200  POC Glucose 4x Daily Before Meals & at Bedtime  4 Times Daily Before Meals & at Bedtime      Comments: Complete no more than 45 minutes prior to patient eating      10/20/24 2158    10/20/24 2159  Daily Weights  Daily       10/20/24 2158  "   10/20/24 2158  sodium chloride 0.9 % flush 10 mL  As Needed         10/20/24 2158    10/20/24 2158  sennosides-docusate (PERICOLACE) 8.6-50 MG per tablet 2 tablet  2 Times Daily PRN        Placed in \"And\" Linked Group    10/20/24 2158    10/20/24 2158  polyethylene glycol (MIRALAX) packet 17 g  Daily PRN        Placed in \"And\" Linked Group    10/20/24 2158    10/20/24 2158  bisacodyl (DULCOLAX) EC tablet 5 mg  Daily PRN        Placed in \"And\" Linked Group    10/20/24 2158    10/20/24 2158  bisacodyl (DULCOLAX) suppository 10 mg  Daily PRN        Placed in \"And\" Linked Group    10/20/24 2158    10/20/24 2158  dextrose (GLUTOSE) oral gel 15 g  Every 15 Minutes PRN         10/20/24 2158    10/20/24 2158  dextrose (D50W) (25 g/50 mL) IV injection 25 g  Every 15 Minutes PRN         10/20/24 2158    10/20/24 2158  glucagon HCl (Diagnostic) injection 1 mg  Every 15 Minutes PRN         10/20/24 2158    Unscheduled  Follow Hypoglycemia Standing Orders For Blood Glucose <70 & Notify Provider of Treatment  As Needed      Comments: Follow Hypoglycemia Orders As Outlined in Process Instructions (Open Order Report to View Full Instructions)  Notify Provider Any Time Hypoglycemia Treatment is Administered    10/20/24 2158    Unscheduled  Skin Tear Care - Minimal Drainage PRN  As Needed      Comments: - Realign Skin Flap (if Viable) Gently Ease Flap Into Place Using a Dampened Cotton-Tipped Applicator or Gloved Finger  - Gently Cleanse Area & Pat Dry  - Apply Hydrogel & Non-Adherent Layer (Silicone Sheet, Oil Emulsion Dressing or Vaseline Gauze)  - Secure With Silicone Border Dressing (Unless Skin Fragile)  - If Skin is Fragile Secure With Roll Gauze - Do NOT Put Tape Directly on Skin  - Change Every 3 Days & As Needed    10/22/24 0405    --  polyvinyl alcohol (LIQUIFILM) 1.4 % ophthalmic solution  3 Times Daily PRN         10/20/24 2214    --  empagliflozin (JARDIANCE) 25 MG tablet tablet  Daily         10/20/24 2214    --  " metFORMIN (GLUCOPHAGE) 1000 MG tablet  2 Times Daily With Meals         10/20/24 2214    --  sildenafil (VIAGRA) 100 MG tablet  Daily PRN         10/20/24 2214    --  lisinopril (PRINIVIL,ZESTRIL) 10 MG tablet  Daily         10/20/24 2214    --  glipizide (GLUCOTROL) 10 MG tablet  2 Times Daily Before Meals         10/20/24 2214    --  omeprazole (priLOSEC) 20 MG capsule  Daily         10/20/24 2214    --  atorvastatin (LIPITOR) 40 MG tablet  Nightly         10/20/24 2214    --  Melatonin 3 MG capsule  Nightly PRN         10/20/24 2214    --  metoprolol succinate XL (TOPROL-XL) 100 MG 24 hr tablet  Daily         10/20/24 2214    --  ascorbic acid (VITAMIN C) 250 MG tablet  Daily         10/20/24 2214    --  ferrous gluconate (FERGON) 324 MG tablet  Daily With Breakfast         10/20/24 2214    --  folic acid (FOLVITE) 1 MG tablet  Daily         10/20/24 2214    --  furosemide (LASIX) 40 MG tablet  Daily         10/20/24 2214    --  polyethylene glycol (MiraLax Mix-In [place]) 17 g packet  Daily PRN         10/20/24 2214    --  senna 8.6 MG tablet  Daily PRN         10/20/24 2214    --  psyllium (METAMUCIL) 58.6 % packet  Daily PRN         10/20/24 2214    --  apixaban (ELIQUIS) 5 MG tablet tablet  2 Times Daily         10/20/24 2214    --  sertraline (ZOLOFT) 100 MG tablet  Daily         10/20/24 2214    --  levoFLOXacin (LEVAQUIN) 500 MG tablet  Daily         10/20/24 2214                     Physician Progress Notes (last 48 hours)        Pietro Hilton MD at 10/23/24 1021          Shortness of Breath    Tyler Andrade is a 84 y.o. male who presents today to [address] for Shortness of Breath.    Subjective: No major acute events reported overnight.  Chief Complaint: Hypoxia          VITALS:   Vitals:    10/23/24 0721   BP:    Pulse: 99   Resp: 20   Temp:    SpO2: 97%          CURRENT MEDICATIONS:   Current Facility-Administered Medications   Medication Dose Route Frequency Provider Last Rate Last  Admin    acetylcysteine (MUCOMYST) 20 % nebulizer solution 3 mL  3 mL Nebulization BID - RT Nurys Campos MD   3 mL at 10/23/24 0711    apixaban (ELIQUIS) tablet 5 mg  5 mg Oral BID Dakota Bruce MD   5 mg at 10/23/24 0844    ascorbic acid (VITAMIN C) tablet 250 mg  250 mg Oral Daily Dakota Bruce MD   250 mg at 10/23/24 0844    atorvastatin (LIPITOR) tablet 20 mg  20 mg Oral Nightly Dakota Bruce MD   20 mg at 10/22/24 2144    sennosides-docusate (PERICOLACE) 8.6-50 MG per tablet 2 tablet  2 tablet Oral BID PRN Dakota Bruce MD        And    polyethylene glycol (MIRALAX) packet 17 g  17 g Oral Daily PRN Dakota Bruce MD        And    bisacodyl (DULCOLAX) EC tablet 5 mg  5 mg Oral Daily PRN Dakota Bruce MD        And    bisacodyl (DULCOLAX) suppository 10 mg  10 mg Rectal Daily PRN Dakota Bruce MD        cefepime 2000 mg IVPB in 100 mL NS (VTB)  2,000 mg Intravenous Q12H Nurys Campos MD   2,000 mg at 10/23/24 0044    dextrose (D50W) (25 g/50 mL) IV injection 25 g  25 g Intravenous Q15 Min PRN Andrade Jensen APRN        dextrose (GLUTOSE) oral gel 15 g  15 g Oral Q15 Min PRN Andrade Jensen APRN        ferrous sulfate tablet 325 mg  325 mg Oral Daily With Breakfast Dakota Bruce MD   325 mg at 10/23/24 0844    folic acid (FOLVITE) tablet 1 mg  1 mg Oral Daily Dakota Bruce MD   1 mg at 10/23/24 0844    glucagon HCl (Diagnostic) injection 1 mg  1 mg Intramuscular Q15 Min PRN Andrade Jensen APRN        guaifenesin (ROBITUSSIN) 100 MG/5ML liquid 200 mg  200 mg Oral Q4H PRN Andrade Jensen APRN   200 mg at 10/23/24 0458    Insulin Lispro (humaLOG) injection 2-7 Units  2-7 Units Subcutaneous 4x Daily AC & at Bedtime Andrade Jensen APRN   3 Units at 10/23/24 0843    ipratropium-albuterol (DUO-NEB) nebulizer solution 3 mL  3 mL Nebulization TID - RT Nurys Campos MD   3 mL at 10/23/24 0711    melatonin tablet 5 mg  5 mg Oral Nightly PRN Dakota Bruce MD         metroNIDAZOLE (FLAGYL) IVPB 500 mg  500 mg Intravenous Q8H Nurys Campos  mL/hr at 10/23/24 0412 500 mg at 10/23/24 0412    pantoprazole (PROTONIX) EC tablet 40 mg  40 mg Oral Q AM Dakota Bruce MD   40 mg at 10/23/24 0535    polyvinyl alcohol (LIQUIFILM) 1.4 % ophthalmic solution 1 drop  1 drop Both Eyes TID PRN Dakota Bruce MD        Psyllium (METAMUCIL MULTIHEALTH FIBER) 51.7 % packet 1 packet  1 packet Oral Daily PRN Dakota Bruce MD        sertraline (ZOLOFT) tablet 100 mg  100 mg Oral Daily Dakota Bruce MD   100 mg at 10/23/24 0844    sodium chloride 0.9 % flush 10 mL  10 mL Intravenous Q12H Dakota Bruce MD   10 mL at 10/22/24 2158    sodium chloride 0.9 % flush 10 mL  10 mL Intravenous PRN Dakota Bruce MD        thiamine (VITAMIN B-1) tablet 200 mg  200 mg Oral Q8H Dakota Bruce MD   200 mg at 10/23/24 0535    vancomycin (VANCOCIN) 1,000 mg in sodium chloride 0.9 % 250 mL IVPB-VTB  1,000 mg Intravenous Q18H Nurys Campos MD             GENERAL:AOx3  Damage to patient's nasal bridge facial area mostly secondary to previous RT or CT although no signs of infection apparent.  HEART: S1, S2   LUNGS: decrease air entry b/l, rales bilateral  ABDOMEN: soft NT/ND   EXTREMITIES: Minimal edema, no cyanosis    ALLERGIES:   No Known Allergies      XR Chest 1 View  Narrative: XR CHEST 1 VW-     CLINICAL INDICATION: Patient complaining of shortness of breath,  coughing up blood tinged sputum.; J18.9-Pneumonia, unspecified organism        COMPARISON: 10/20/2024     TECHNIQUE: Single frontal view of the chest.     FINDINGS:     LUNGS: Dense left-sided consolidation     HEART AND MEDIASTINUM: Heart and mediastinal contours are unremarkable        SKELETON: Bony and soft tissue structures are unremarkable.           Impression: Dense left-sided consolidation           This report was finalized on 10/23/2024 7:42 AM by Dr. David Lopez MD.       Results from last 7 days    Lab Units 10/23/24  0657 10/22/24  0153 10/21/24  0104   WBC 10*3/mm3 14.54* 11.45* 11.81*   HEMOGLOBIN g/dL 8.6* 8.1* 8.0*   HEMATOCRIT % 28.7* 27.1* 26.9*   PLATELETS 10*3/mm3 403 352 386      ASSESSMENT & PLAN :  ----------------------------------    IMPRESSION:  - NSCLC of the left lower lobe previous BERNIE s/p SBRT, currently enlarging lung mass and mediastinal adenopathy, being treated/rule out PNA    -Previous basal cell carcinoma of the tip of the nose/cheeks, history of squamous cell carcinoma right medial cheek/        PLAN:  Patient CT scan personally reviewed and compared to prior CT scans patient has distant lymphadenopathy, given patient's prior history of lung cancer with BERNIE s/p SBRT and facial/cutaneous cancers this is concerning for advancement of patient's malignancy.  No lung biopsy is indicated at this time.  Patient might benefit from a PET scan.  Patient is on Eliquis,  Patient has been started on empiric antibiotics for postobstructive pneumonia.  Will need to finish course of 5 to 7 days.  Vancomycin, cefepime and Flagyl.  MRSA nares was positive.  Although this is most likely representing advancement of patient's disease rather than pneumonia.    Patient has hoarseness of voice and this is concerning for possible involvement of vocal cords, recommend ENT evaluation with laryngoscopy.    Patient might benefit from chest percussion therapy 3 times daily to encourage secretion expectoration.    Patient has a complex medical pathologies might benefit from multidisciplinary specialty meetings including ENT, palliative care, oncology for goals of care and plans of treatment.      Will sign off please call with any questions      This document has been electronically signed by Pietro Hilton MD   October 23, 2024 10:22 EDT    Dictated Utilizing Dragon Dictation: Part of this note may be an electronic transcription/translation of spoken language to printed text using the Dragon Dictation System.      Electronically signed by Pietro Hilton MD at 10/23/24 1033       Nurys Campos MD at 10/23/24 0802              [facility] HOSPITALIST PROGRESS NOTE     Patient Identification:  Name:  Tyler Andrade  Age:  84 y.o.  Sex:  male  :  1940  MRN:  [id number]  Visit Number:  [id number]  Primary Care Provider:  Skyla Faustin MD    Length of stay:  1    Subjective: Patient seen and examined, currently eating lunch, he is on nectar thickened liquids.  He is getting chest percussions and Mucomyst neb treatment, reports still having difficulty with expectoration.  Chest x-ray persistent left-sided dense consolidation.  MRSA screening was positive, sputum culture pending.  Afebrile.  I was able to discuss with daughter yesterday and patient and daughter wish for patient to be transferred to [facility] for logistics access and specialty.  He is supposed to get radiation treatment today with Dr. Ovalles which I was able to talk on the phone and agree that patient needs to be transferred where he will have access to all specialty and his radiation therapy.    Chief Complaint: Coughing generalized weakness  ----------------------------------------------------------------------------------------------------------------------  Current Hospital Meds:  acetylcysteine, 3 mL, Nebulization, BID - RT  apixaban, 5 mg, Oral, BID  ascorbic acid, 250 mg, Oral, Daily  atorvastatin, 20 mg, Oral, Nightly  cefepime, 2,000 mg, Intravenous, Q12H  ferrous sulfate, 325 mg, Oral, Daily With Breakfast  folic acid, 1 mg, Oral, Daily  insulin lispro, 2-7 Units, Subcutaneous, 4x Daily AC & at Bedtime  ipratropium-albuterol, 3 mL, Nebulization, TID - RT  metroNIDAZOLE, 500 mg, Intravenous, Q8H  pantoprazole, 40 mg, Oral, Q AM  sertraline, 100 mg, Oral, Daily  sodium chloride, 10 mL, Intravenous, Q12H  vitamin B-1, 200 mg, Oral, Q8H  vancomycin, 1,000 mg, Intravenous, Q18H          ----------------------------------------------------------------------------------------------------------------------  Vital Signs:  Temp:  [97.8 °F (36.6 °C)-98.8 °F (37.1 °C)] 97.8 °F (36.6 °C)  Heart Rate:  [] 104  Resp:  [16-22] 20  BP: (125-149)/(69-90) 129/69       Tele:       10/21/24  0500 10/22/24  0500 10/23/24  0500   Weight: 86.5 kg (190 lb 11.2 oz) 85.7 kg (189 lb) 88.8 kg (195 lb 11.2 oz)     Body mass index is 28.9 kg/m².    Intake/Output Summary (Last 24 hours) at 10/23/2024 0802  Last data filed at 10/23/2024 0500  Gross per 24 hour   Intake 1864 ml   Output 1750 ml   Net 114 ml     Diet: Regular/House, Fluid Restriction (240 mL/tray); 1000 mL/day; Texture: Soft to Chew (NDD 3); Soft to Chew: Whole Meat; Fluid Consistency: Nectar Thick  ----------------------------------------------------------------------------------------------------------------------  Physical exam:  General: Comfortable,awake, alert, oriented to self, place, and time, conversant by whispering, eating lunch, he appears to be in a better mood today and less frustrated.  Chronically ill-appearing.  No respiratory distress.    Skin:  Skin is warm and dry. No rash noted. No pallor.    HENT: Face is deformed with multiple superficial ulcers,  Mouth:  Moist mucous membranes.    Eyes:  Conjunctivae and EOM are normal.  Pupils are equal, round, and reactive to light.  No scleral icterus.    Neck:  Neck supple.  No JVD present.    Pulmonary/Chest: Coarse breath sounds on the left with inspiratory and expiratory wheezing polyphonic in nature, right is clear.   Cardiovascular:  Normal rate, regular rhythm and normal heart sounds with no murmur.  Abdominal:  Soft.  Bowel sounds are normal.  No distension and no tenderness.   Extremities:  No edema, no tenderness, and no deformity.  No red or swollen joints anywhere.  Strong pulses in all 4 extremities with no clubbing, no cyanosis, no edema.  Neurological:  Motor strength equal no  "obvious deficit, sensory grossly intact.   No cranial nerve deficit.  No tongue deviation.  No facial droop.  No slurred speech.    Genitourinary: No Santana catheter  Back:  ----------------------------------------------------------------------------------------------------------------------  ----------------------------------------------------------------------------------------------------------------------  Results from last 7 days   Lab Units 10/20/24  1309 10/20/24  1110   HSTROP T ng/L 29* 27*     Results from last 7 days   Lab Units 10/23/24  0657 10/22/24  0153 10/21/24  0104 10/20/24  1110   CRP mg/dL  --   --   --  12.93*   LACTATE mmol/L  --   --   --  2.0   WBC 10*3/mm3 14.54* 11.45* 11.81* 14.19*   HEMOGLOBIN g/dL 8.6* 8.1* 8.0* 9.0*   HEMATOCRIT % 28.7* 27.1* 26.9* 29.9*   MCV fL 88.9 88.3 87.1 86.9   MCHC g/dL 30.0* 29.9* 29.7* 30.1*   PLATELETS 10*3/mm3 403 352 386 433   INR   --   --   --  1.71*     Results from last 7 days   Lab Units 10/23/24  0651   PH, ARTERIAL pH units 7.393   PO2 ART mm Hg 83.3   PCO2, ARTERIAL mm Hg 41.6   HCO3 ART mmol/L 25.3     Results from last 7 days   Lab Units 10/23/24  0657 10/22/24  0154 10/21/24  0104 10/20/24  1110   SODIUM mmol/L 136 129* 128* 124*   POTASSIUM mmol/L 4.7 4.9 4.6 4.6   MAGNESIUM mg/dL  --   --  1.8  --    CHLORIDE mmol/L 102 96* 93* 88*   CO2 mmol/L 24.8 25.3 26.6 25.5   BUN mg/dL 26* 24* 18 16   CREATININE mg/dL 1.10 1.33* 1.07 0.93   CALCIUM mg/dL 9.8 9.9 10.0 10.3   GLUCOSE mg/dL 150* 150* 68 163*   ALBUMIN g/dL 2.9*  --  2.7* 3.1*   BILIRUBIN mg/dL 0.3  --  0.2 0.3   ALK PHOS U/L 84  --  82 93   AST (SGOT) U/L 25  --  22 24   ALT (SGPT) U/L 29  --  24 28   Estimated Creatinine Clearance: 56.1 mL/min (by C-G formula based on SCr of 1.1 mg/dL).    No results found for: \"AMMONIA\"      Blood Culture   Date Value Ref Range Status   10/20/2024 No growth at 2 days  Preliminary   10/20/2024 No growth at 2 days  Preliminary     No results found for: " "\"URINECX\"  No results found for: \"WOUNDCX\"  No results found for: \"STOOLCX\"    I have personally looked at the labs and they are summarized above.  ----------------------------------------------------------------------------------------------------------------------  Imaging Results (Last 24 Hours)       Procedure Component Value Units Date/Time    XR Chest 1 View [[id number]] Collected: 10/23/24 0741     Updated: 10/23/24 0744    Narrative:      XR CHEST 1 VW-     CLINICAL INDICATION: Patient complaining of shortness of breath,  coughing up blood tinged sputum.; J18.9-Pneumonia, unspecified organism        COMPARISON: 10/20/2024     TECHNIQUE: Single frontal view of the chest.     FINDINGS:     LUNGS: Dense left-sided consolidation     HEART AND MEDIASTINUM: Heart and mediastinal contours are unremarkable        SKELETON: Bony and soft tissue structures are unremarkable.             Impression:      Dense left-sided consolidation           This report was finalized on 10/23/2024 7:42 AM by Dr. David Lopez MD.       Fiberoptic Endo (fees) [[id number]] Resulted: 10/22/24 1528     Updated: 10/22/24 1528    Narrative:      This procedure was auto-finalized with no dictation required.          ----------------------------------------------------------------------------------------------------------------------  Assessment and Plan:  -Probable post obstructive pneumonia left-sided versus aspiration pneumonia  -Small cell carcinoma of the lungs  -History of basal cell carcinoma  -History of MI  -History of retinal hemorrhage  -Debility with significant physical deconditioning  -History of paroxysmal atrial fibrillation  -History of essential hypertension  -Diabetes fairly controlled with polyneuropathy  -Dysphonia etiology unclear from cancer of the lungs probably versus secondary to previous exposure to radiation of the face for treatment of basal cell carcinoma    Continue vancomycin and cefepime and Flagyl, follow-up " sputum culture, continue Mucomyst with chest percussion/speech therapy.  Neb treatment.  Check and sliding scale.  Trying to initiate transfer at [facility].    Disposition: For possible transfer      Nurys Campos MD  10/23/24  08:02 EDT    Electronically signed by Nurys Campos MD at 10/23/24 8413       Byod Del Real MD at 10/22/24 7765            Pulmonary and critical care consultation note  Referring Provider: dr Campos  Reason for Consultation: Abnormal CT chest      Chief complaint -shortness of breath      Sub-overnight events reviewed.  All the labs medications ins and outs and vitals reviewed.  Patient resting in bed comfortably    Had a speech and swallow evaluation and was found to be aspirating.    Review of Systems  Positive for shortness of breath otherwise negative.        History  History reviewed. No pertinent past medical history., History reviewed. No pertinent surgical history., History reviewed. No pertinent family history.,   Social History     Tobacco Use    Smoking status: Former     Current packs/day: 0.00     Types: Cigarettes     Quit date:      Years since quittin.8     Passive exposure: Past    Smokeless tobacco: Never   Vaping Use    Vaping status: Never Used   Substance Use Topics    Alcohol use: Never    Drug use: Never   ,   Medications Prior to Admission   Medication Sig Dispense Refill Last Dose/Taking    apixaban (ELIQUIS) 5 MG tablet tablet Take 1 tablet by mouth 2 (Two) Times a Day.   10/20/2024 Morning    ascorbic acid (VITAMIN C) 250 MG tablet Take 1 tablet by mouth Daily.   10/20/2024 Morning    atorvastatin (LIPITOR) 40 MG tablet Take 0.5 tablets by mouth Every Night.   10/19/2024 Bedtime    empagliflozin (JARDIANCE) 25 MG tablet tablet Take 0.5 tablets by mouth Daily.   10/20/2024 Morning    ferrous gluconate (FERGON) 324 MG tablet Take 1 tablet by mouth Daily With Breakfast.   10/20/2024 Morning    folic acid (FOLVITE) 1 MG tablet Take 1 tablet  by mouth Daily.   10/20/2024 Morning    furosemide (LASIX) 40 MG tablet Take 1 tablet by mouth Daily.   10/20/2024    glipizide (GLUCOTROL) 10 MG tablet Take 1.5 tablets by mouth 2 (Two) Times a Day Before Meals.   10/20/2024 Morning    levoFLOXacin (LEVAQUIN) 500 MG tablet Take 1 tablet by mouth Daily.   10/20/2024    lisinopril (PRINIVIL,ZESTRIL) 10 MG tablet Take 0.5 tablets by mouth Daily.   10/20/2024 Morning    metFORMIN (GLUCOPHAGE) 1000 MG tablet Take 1 tablet by mouth 2 (Two) Times a Day With Meals.   10/20/2024 Morning    metoprolol succinate XL (TOPROL-XL) 100 MG 24 hr tablet Take 0.5 tablets by mouth Daily.   10/20/2024    omeprazole (priLOSEC) 20 MG capsule Take 1 capsule by mouth Daily.   10/20/2024    sertraline (ZOLOFT) 100 MG tablet Take 1 tablet by mouth Daily.   10/20/2024    Melatonin 3 MG capsule Take 2 capsules by mouth At Night As Needed (Sleep).   Unknown    polyethylene glycol (MiraLax Mix-In Paradox) 17 g packet Take 17 g by mouth Daily As Needed (Constipation).   Unknown    polyvinyl alcohol (LIQUIFILM) 1.4 % ophthalmic solution Administer 1 drop to both eyes 3 (Three) Times a Day As Needed for Dry Eyes.   Unknown    psyllium (METAMUCIL) 58.6 % packet Take 1 packet by mouth Daily As Needed (Constipation).   Unknown    senna 8.6 MG tablet Take 2 tablets by mouth Daily As Needed for Constipation.   Unknown    sildenafil (VIAGRA) 100 MG tablet Take 1 tablet by mouth Daily As Needed for Erectile Dysfunction.   Unknown   , Scheduled Meds:  acetylcysteine, 3 mL, Nebulization, BID - RT  apixaban, 5 mg, Oral, BID  ascorbic acid, 250 mg, Oral, Daily  atorvastatin, 20 mg, Oral, Nightly  [START ON 10/23/2024] cefepime, 2,000 mg, Intravenous, Q12H  ferrous sulfate, 325 mg, Oral, Daily With Breakfast  folic acid, 1 mg, Oral, Daily  insulin lispro, 2-7 Units, Subcutaneous, 4x Daily AC & at Bedtime  ipratropium-albuterol, 3 mL, Nebulization, TID - RT  metroNIDAZOLE, 500 mg, Intravenous, Q8H  pantoprazole, 40  mg, Oral, Q AM  sertraline, 100 mg, Oral, Daily  sodium chloride, 10 mL, Intravenous, Q12H  vitamin B-1, 200 mg, Oral, Q8H  [START ON 10/23/2024] vancomycin, 1,000 mg, Intravenous, Q18H  vancomycin, 20 mg/kg, Intravenous, Once    , Continuous Infusions:  Pharmacy Consult - Pharmacy to dose,   Pharmacy Consult - Pharmacy to dose,      and Allergies:  Patient has no known allergies.    Objective     Vital Signs   Temp:  [97.9 °F (36.6 °C)-98.8 °F (37.1 °C)] 98.8 °F (37.1 °C)  Heart Rate:  [69-94] 94  Resp:  [16-20] 16  BP: (111-137)/(59-71) 125/69    Physical Exam:             General- normal in appearance, not in any acute distress    HEENT- pupils equally reactive to light, normal in size, no scleral icterus    Neck-supple    Respiratory-respirations normal-on auscultation no wheezing no crackles,     Cardiovascular-  Normal S1 and S2. No S3, S4 or murmurs. No JVD, no carotid bruit and no edema, pulses normal bilaterally     GI-nontender nondistended bowel sounds positive    CNS-nonfocal    Musculoskeletal -no edema  Extremities- no obvious deformity noticed     Psychiatric-mood good, good eye contact, alert awake oriented  Skin-some healing spots on the face.  As per the patient he had skin cancer.                                                                  Results Review:    LABS:    Lab Results   Component Value Date    GLUCOSE 150 (H) 10/22/2024    BUN 24 (H) 10/22/2024    CREATININE 1.33 (H) 10/22/2024    EGFRIFNONA 45 (L) 05/22/2021    BCR 18.0 10/22/2024    CO2 25.3 10/22/2024    CALCIUM 9.9 10/22/2024    ALBUMIN 2.7 (L) 10/21/2024    AST 22 10/21/2024    ALT 24 10/21/2024    WBC 11.45 (H) 10/22/2024    HGB 8.1 (L) 10/22/2024    HCT 27.1 (L) 10/22/2024    MCV 88.3 10/22/2024     10/22/2024     (L) 10/22/2024    K 4.9 10/22/2024    CL 96 (L) 10/22/2024    ANIONGAP 7.7 10/22/2024       Lab Results   Component Value Date    INR 1.71 (H) 10/20/2024    INR 1.16 (H) 08/26/2024    INR 1.20 (H)  08/31/2023    PROTIME 20.2 (H) 10/20/2024    PROTIME 14.9 (H) 08/26/2024    PROTIME 15.8 (H) 08/31/2023       Results from last 7 days   Lab Units 10/20/24  1110   INR  1.71*   APTT seconds 53.4*          I reviewed the patient's new clinical results.  I reviewed the patient's new imaging results and agree with the interpretation.    Microbiology Results (last 10 days)       Procedure Component Value - Date/Time    MRSA Screen, PCR (Inpatient) - Swab, Nares [[id number]]  (Abnormal) Collected: 10/22/24 1212    Lab Status: Final result Specimen: Swab from Nares Updated: 10/22/24 1430     MRSA PCR MRSA Detected    Narrative:      The negative predictive value of this diagnostic test is high and should only be used to consider de-escalating anti-MRSA therapy. A positive result may indicate colonization with MRSA and must be correlated clinically.    Respiratory Culture - Sputum, Cough [[id number]] Collected: 10/21/24 2146    Lab Status: Preliminary result Specimen: Sputum from Cough Updated: 10/21/24 2218     Gram Stain Few (2+) Epithelial cells seen      Many (4+) WBCs seen      Moderate (3+) Mixed bacterial morphotypes seen on Gram Stain    Blood Culture - Blood, Arm, Right [[id number]]  (Normal) Collected: 10/20/24 1130    Lab Status: Preliminary result Specimen: Blood from Arm, Right Updated: 10/22/24 1145     Blood Culture No growth at 2 days    COVID-19, FLU A/B, RSV PCR 1 HR TAT - Swab, Nasopharynx [[id number]]  (Normal) Collected: 10/20/24 1130    Lab Status: Final result Specimen: Swab from Nasopharynx Updated: 10/20/24 1220     COVID19 Not Detected     Influenza A PCR Not Detected     Influenza B PCR Not Detected     RSV, PCR Not Detected    Narrative:      Fact sheet for providers: https://www.fda.gov/media/505665/download    Fact sheet for patients: https://www.fda.gov/media/634127/download    Test performed by PCR.    Blood Culture - Blood, Arm, Right [[id number]]  (Normal) Collected: 10/20/24 1110    Lab  Status: Preliminary result Specimen: Blood from Arm, Right Updated: 10/22/24 1147     Blood Culture No growth at 2 days           Assessment & Plan         Abnormal CT chest-patient already has a diagnosis of lung cancer and will be going for chemo and radiation.  Continue current antibiotics-likely has aspiration versus postobstructive pneumonia.       PT OT to avoid deconditioning    Speech and swallow test recommendations reviewed.  Continue strict aspiration precautions    Follow-up with hematology oncology on the outpatient basis for the treatment of squamous cell lung cancer.    Cardiology- hemodynamically -stable.  Continue current treatment  Continue to monitor HR- rate and rhythm, BP     Nephrology- Cr and BUN stable  I/O-ins and outs reviewed    GI-continue current diet.    Hematology- CBC  Hb-transfuse for less than 7  platelet  WBC    ID-procalcitonin being normal.  Microbiology negative.  Can consider discontinuing antibiotics.    Endrocrinology-diabetes mellitus-maintain Blood sugar 140 -180    Electrolytes-   Mag and phos       DVT prophylaxis-continue        Family member present-none                Inability to walk    Aspiration pneumonia          Boyd Del Real MD  10/22/24  16:08 EDT         Electronically signed by Boyd Del Real MD at 10/22/24 1611       Nurys Campos MD at 10/22/24 1048              [facility] HOSPITALIST PROGRESS NOTE     Patient Identification:  Name:  Tyler Andrade  Age:  84 y.o.  Sex:  male  :  1940  MRN:  [id number]  Visit Number:  [id number]  Primary Care Provider:  Skyla Faustin MD    Length of stay:  0    Subjective: Patient seen and examined, he is somewhat irritable, having difficulty in coughing which unfortunately because of dysphonia and will likely his tumor affecting the left recurrent glandular rendering him unable to cough effectively.  Also confirmed suspicion of silent aspiration on modified barium swallow per speech  evaluation.  Patient is somewhat irritated and upset feeling helpless.  Explained to him our plan continue antibiotic and trial of Mucomyst and chest percussion.  Ultimately, treatment will be depending on his cancer.  He is post to have radiation tomorrow but because of this, likely be canceled.  Will attempt to call today [facility] where he gets his radiation and discussed the case.  His dysphonia and generalized weakness has been ongoing for the past 3 to 4 months has been gradual but worse this past week.    Chief Complaint: Generalized weakness  ----------------------------------------------------------------------------------------------------------------------  Current Hospital Meds:  acetylcysteine, 3 mL, Nebulization, BID - RT  apixaban, 5 mg, Oral, BID  ascorbic acid, 250 mg, Oral, Daily  atorvastatin, 20 mg, Oral, Nightly  ferrous sulfate, 325 mg, Oral, Daily With Breakfast  folic acid, 1 mg, Oral, Daily  insulin lispro, 2-7 Units, Subcutaneous, 4x Daily AC & at Bedtime  ipratropium-albuterol, 3 mL, Nebulization, TID - RT  pantoprazole, 40 mg, Oral, Q AM  sertraline, 100 mg, Oral, Daily  sodium chloride, 10 mL, Intravenous, Q12H  vitamin B-1, 200 mg, Oral, Q8H      Pharmacy Consult - Pharmacy to dose,       ----------------------------------------------------------------------------------------------------------------------  Vital Signs:  Temp:  [97.3 °F (36.3 °C)-98.7 °F (37.1 °C)] 98.6 °F (37 °C)  Heart Rate:  [69-93] 92  Resp:  [18-20] 20  BP: (111-137)/(59-72) 122/59       Tele:       10/20/24  2240 10/21/24  0500 10/22/24  0500   Weight: 86.5 kg (190 lb 11.2 oz) (Anthony Lopes) 86.5 kg (190 lb 11.2 oz) 85.7 kg (189 lb)     Body mass index is 27.91 kg/m².    Intake/Output Summary (Last 24 hours) at 10/22/2024 1048  Last data filed at 10/22/2024 0900  Gross per 24 hour   Intake 720 ml   Output 600 ml   Net 120 ml     Diet: Regular/House, Fluid Restriction (240 mL/tray); 1000 mL/day; Texture: Soft to  Chew (NDD 3); Soft to Chew: Whole Meat; Fluid Consistency: Nectar Thick  ----------------------------------------------------------------------------------------------------------------------  Physical exam:  General: Comfortable,awake, alert, oriented to self, place, and time, well-developed and well-nourished.  No respiratory distress.    Skin:  Skin is warm and dry. No rash noted. No pallor.    HENT:  Head:  Normocephalic and atraumatic.  Mouth:  Moist mucous membranes.    Eyes:  Conjunctivae and EOM are normal.  Pupils are equal, round, and reactive to light.  No scleral icterus.    Neck:  Neck supple.  No JVD present.    Pulmonary/Chest:  No respiratory distress, no wheezes, no crackles, with normal breath sounds and good air movement.  Cardiovascular:  Normal rate, regular rhythm and normal heart sounds with no murmur.  Abdominal:  Soft.  Bowel sounds are normal.  No distension and no tenderness.   Extremities:  No edema, no tenderness, and no deformity.  No red or swollen joints anywhere.  Strong pulses in all 4 extremities with no clubbing, no cyanosis, no edema.  Neurological:  Motor strength equal no obvious deficit, sensory grossly intact.   No cranial nerve deficit.  No tongue deviation.  No facial droop.  No slurred speech.    Genitourinary:   Back:  ----------------------------------------------------------------------------------------------------------------------  ----------------------------------------------------------------------------------------------------------------------  Results from last 7 days   Lab Units 10/20/24  1309 10/20/24  1110   HSTROP T ng/L 29* 27*     Results from last 7 days   Lab Units 10/22/24  0153 10/21/24  0104 10/20/24  1110   CRP mg/dL  --   --  12.93*   LACTATE mmol/L  --   --  2.0   WBC 10*3/mm3 11.45* 11.81* 14.19*   HEMOGLOBIN g/dL 8.1* 8.0* 9.0*   HEMATOCRIT % 27.1* 26.9* 29.9*   MCV fL 88.3 87.1 86.9   MCHC g/dL 29.9* 29.7* 30.1*   PLATELETS 10*3/mm3 352 158 707  "  INR   --   --  1.71*     Results from last 7 days   Lab Units 10/20/24  1112   PH, ARTERIAL pH units 7.430   PO2 ART mm Hg 68.0*   PCO2, ARTERIAL mm Hg 38.0   HCO3 ART mmol/L 25.2     Results from last 7 days   Lab Units 10/22/24  0154 10/21/24  0104 10/20/24  1110   SODIUM mmol/L 129* 128* 124*   POTASSIUM mmol/L 4.9 4.6 4.6   MAGNESIUM mg/dL  --  1.8  --    CHLORIDE mmol/L 96* 93* 88*   CO2 mmol/L 25.3 26.6 25.5   BUN mg/dL 24* 18 16   CREATININE mg/dL 1.33* 1.07 0.93   CALCIUM mg/dL 9.9 10.0 10.3   GLUCOSE mg/dL 150* 68 163*   ALBUMIN g/dL  --  2.7* 3.1*   BILIRUBIN mg/dL  --  0.2 0.3   ALK PHOS U/L  --  82 93   AST (SGOT) U/L  --  22 24   ALT (SGPT) U/L  --  24 28   Estimated Creatinine Clearance: 45.7 mL/min (A) (by C-G formula based on SCr of 1.33 mg/dL (H)).    No results found for: \"AMMONIA\"      Blood Culture   Date Value Ref Range Status   10/20/2024 No growth at 24 hours  Preliminary   10/20/2024 No growth at 24 hours  Preliminary     No results found for: \"URINECX\"  No results found for: \"WOUNDCX\"  No results found for: \"STOOLCX\"    I have personally looked at the labs and they are summarized above.  ----------------------------------------------------------------------------------------------------------------------  Imaging Results (Last 24 Hours)       Procedure Component Value Units Date/Time    FL Video Swallow Single Contrast [[id number]] Collected: 10/21/24 1433     Updated: 10/21/24 1435    Narrative:      FL VIDEO SWALLOW SINGLE-CONTRAST-     CLINICAL INDICATION: r/o aspiration; J18.9-Pneumonia, unspecified  organism        TECHNIQUE:   Speech therapy service was present. The patient was examined in the  sitting lateral position and was given several consistencies of barium.     FINDINGS: Aspiration with thin liquids     FLUOROSCOPY TIME: 0.9-minute     Images: Cine loop was acquired       Impression:      Aspiration with thin liquids     For additional information please see the report " provided by the speech  therapy service     This report was finalized on 10/21/2024 2:33 PM by Dr. David Lopez MD.             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  -Silent aspiration with thin liquids likely secondary to left laryngeal nerve involvement  -Acute on chronic debility  -History of diabetes with neuropathy  -History of squamous cell carcinoma of the lung currently receiving radiation  -History of basal cell carcinoma face  -History of essential hypertension  -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  For now empirically will continue antimicrobial to include coverage for postobstructive pneumonia as well as possible aspiration pneumonia.    Disposition pending      Nurys Campos MD  10/22/24  10:48 EDT    Electronically signed by Nurys Campos MD at 10/22/24 1058       Consult Notes (last 48 hours)  Notes from 10/22/24 0846 through 10/24/24 0846   No notes of this type exist for this encounter.

## 2024-10-24 NOTE — SIGNIFICANT NOTE
10/24/24 1406   Rehab Time/Intention   Session Not Performed patient/family declined treatment  (Pt sleeping upon SLP entry. Arouses and requests to defer until later this afternoon at this time. SLP to f/u.)   Recommendations   SLP - Next Appointment 10/24/24

## 2024-10-24 NOTE — PLAN OF CARE
Goal Outcome Evaluation:  Plan of Care Reviewed With: patient        Progress: no change  Outcome Evaluation: Patient resting in bed. VSS on 2L NC. Patient requested PRN medications this shift, see MAR. Patient voices no concerns at this time, will continue with POC.

## 2024-10-24 NOTE — SIGNIFICANT NOTE
10/24/24 1121   OTHER   Discipline speech language pathologist   Rehab Time/Intention   Session Not Performed patient unavailable for treatment  (Pt receiving care from RN and nursing students. SLP to f/u.)   Recommendations   SLP - Next Appointment 10/24/24

## 2024-10-25 LAB
BACTERIA SPEC AEROBE CULT: NORMAL
BACTERIA SPEC AEROBE CULT: NORMAL
GLUCOSE BLDC GLUCOMTR-MCNC: 173 MG/DL (ref 70–130)
GLUCOSE BLDC GLUCOMTR-MCNC: 228 MG/DL (ref 70–130)
GLUCOSE BLDC GLUCOMTR-MCNC: 245 MG/DL (ref 70–130)
GLUCOSE BLDC GLUCOMTR-MCNC: 247 MG/DL (ref 70–130)

## 2024-10-25 PROCEDURE — 94799 UNLISTED PULMONARY SVC/PX: CPT

## 2024-10-25 PROCEDURE — 25810000003 SODIUM CHLORIDE 0.9 % SOLUTION: Performed by: HOSPITALIST

## 2024-10-25 PROCEDURE — 25010000002 CEFEPIME PER 500 MG: Performed by: HOSPITALIST

## 2024-10-25 PROCEDURE — 63710000001 INSULIN LISPRO (HUMAN) PER 5 UNITS

## 2024-10-25 PROCEDURE — 99232 SBSQ HOSP IP/OBS MODERATE 35: CPT | Performed by: HOSPITALIST

## 2024-10-25 PROCEDURE — 92526 ORAL FUNCTION THERAPY: CPT | Performed by: SPEECH-LANGUAGE PATHOLOGIST

## 2024-10-25 PROCEDURE — 25010000002 VANCOMYCIN 5 G RECONSTITUTED SOLUTION: Performed by: HOSPITALIST

## 2024-10-25 PROCEDURE — 94640 AIRWAY INHALATION TREATMENT: CPT

## 2024-10-25 PROCEDURE — 94761 N-INVAS EAR/PLS OXIMETRY MLT: CPT

## 2024-10-25 PROCEDURE — 25010000002 METRONIDAZOLE 500 MG/100ML SOLUTION: Performed by: HOSPITALIST

## 2024-10-25 PROCEDURE — 82948 REAGENT STRIP/BLOOD GLUCOSE: CPT

## 2024-10-25 RX ADMIN — IPRATROPIUM BROMIDE AND ALBUTEROL SULFATE 3 ML: 2.5; .5 SOLUTION RESPIRATORY (INHALATION) at 13:31

## 2024-10-25 RX ADMIN — METRONIDAZOLE 500 MG: 500 INJECTION, SOLUTION INTRAVENOUS at 11:30

## 2024-10-25 RX ADMIN — INSULIN LISPRO 3 UNITS: 100 INJECTION, SOLUTION INTRAVENOUS; SUBCUTANEOUS at 11:30

## 2024-10-25 RX ADMIN — ATORVASTATIN CALCIUM 20 MG: 20 TABLET, FILM COATED ORAL at 21:24

## 2024-10-25 RX ADMIN — IPRATROPIUM BROMIDE AND ALBUTEROL SULFATE 3 ML: 2.5; .5 SOLUTION RESPIRATORY (INHALATION) at 19:12

## 2024-10-25 RX ADMIN — Medication 200 MG: at 21:30

## 2024-10-25 RX ADMIN — APIXABAN 5 MG: 5 TABLET, FILM COATED ORAL at 08:00

## 2024-10-25 RX ADMIN — IPRATROPIUM BROMIDE AND ALBUTEROL SULFATE 3 ML: 2.5; .5 SOLUTION RESPIRATORY (INHALATION) at 07:22

## 2024-10-25 RX ADMIN — Medication 200 MG: at 05:35

## 2024-10-25 RX ADMIN — ACETYLCYSTEINE 3 ML: 200 SOLUTION ORAL; RESPIRATORY (INHALATION) at 07:22

## 2024-10-25 RX ADMIN — PANTOPRAZOLE SODIUM 40 MG: 40 TABLET, DELAYED RELEASE ORAL at 05:35

## 2024-10-25 RX ADMIN — SERTRALINE 100 MG: 50 TABLET, FILM COATED ORAL at 08:00

## 2024-10-25 RX ADMIN — ACETYLCYSTEINE 3 ML: 200 SOLUTION ORAL; RESPIRATORY (INHALATION) at 19:12

## 2024-10-25 RX ADMIN — Medication 200 MG: at 14:03

## 2024-10-25 RX ADMIN — CEFEPIME 2000 MG: 2 INJECTION, POWDER, FOR SOLUTION INTRAVENOUS at 12:40

## 2024-10-25 RX ADMIN — FOLIC ACID 1 MG: 1 TABLET ORAL at 08:00

## 2024-10-25 RX ADMIN — Medication 10 ML: at 21:28

## 2024-10-25 RX ADMIN — VANCOMYCIN HYDROCHLORIDE 1250 MG: 5 INJECTION, POWDER, LYOPHILIZED, FOR SOLUTION INTRAVENOUS at 16:48

## 2024-10-25 RX ADMIN — METRONIDAZOLE 500 MG: 500 INJECTION, SOLUTION INTRAVENOUS at 20:59

## 2024-10-25 RX ADMIN — APIXABAN 5 MG: 5 TABLET, FILM COATED ORAL at 21:24

## 2024-10-25 RX ADMIN — FERROUS SULFATE TAB 325 MG (65 MG ELEMENTAL FE) 325 MG: 325 (65 FE) TAB at 08:00

## 2024-10-25 RX ADMIN — CEFEPIME 2000 MG: 2 INJECTION, POWDER, FOR SOLUTION INTRAVENOUS at 00:43

## 2024-10-25 RX ADMIN — METRONIDAZOLE 500 MG: 500 INJECTION, SOLUTION INTRAVENOUS at 04:34

## 2024-10-25 RX ADMIN — INSULIN LISPRO 2 UNITS: 100 INJECTION, SOLUTION INTRAVENOUS; SUBCUTANEOUS at 08:00

## 2024-10-25 RX ADMIN — FLUTICASONE PROPIONATE 2 SPRAY: 50 SPRAY, METERED NASAL at 08:00

## 2024-10-25 RX ADMIN — BENZONATATE 100 MG: 100 CAPSULE ORAL at 10:22

## 2024-10-25 RX ADMIN — OXYCODONE HYDROCHLORIDE AND ACETAMINOPHEN 250 MG: 500 TABLET ORAL at 08:00

## 2024-10-25 RX ADMIN — INSULIN LISPRO 3 UNITS: 100 INJECTION, SOLUTION INTRAVENOUS; SUBCUTANEOUS at 21:24

## 2024-10-25 RX ADMIN — Medication 10 ML: at 08:01

## 2024-10-25 RX ADMIN — INSULIN LISPRO 3 UNITS: 100 INJECTION, SOLUTION INTRAVENOUS; SUBCUTANEOUS at 16:48

## 2024-10-25 NOTE — PLAN OF CARE
Goal Outcome Evaluation:  Plan of Care Reviewed With: patient        Progress: no change  Outcome Evaluation: Pt resting in bed. Scheduled abx administered this shift. VSS on 2L NC. No acute changes. No complaints per pt. Will continue POC.

## 2024-10-25 NOTE — NURSING NOTE
Pt refused q2 turns. Education provided about the importance of turns and the prevention of skin breakdown.

## 2024-10-25 NOTE — CASE MANAGEMENT/SOCIAL WORK
Discharge Planning Assessment  MIKEL Collins     Patient Name: Tyler Andrade  MRN: [id number]  Today's Date: 10/25/2024    Admit Date: 10/20/2024     Discharge Plan       Row Name 10/25/24 1557       Plan    Plan Pt is waiting for bed availability at [facility]. SS to follow.                    KATHRIN Roberson

## 2024-10-25 NOTE — PROGRESS NOTES
[facility] HOSPITALIST PROGRESS NOTE     Patient Identification:  Name:  Tyler Andrade  Age:  84 y.o.  Sex:  male  :  1940  MRN:  [id number]  Visit Number:  [id number]  Primary Care Provider:  Skyla Faustin MD    Length of stay:  3    Subjective: Patient seen and examined, he is in bed, staff reported he has been very poorly motivated to precipitate with PT OT he also reports decreased p.o. intake he reports loss of appetite.  Still congested has significant difficulty with ineffective expectoration coughing from congestion.      Chief Complaint: Coughing dysphonia  ----------------------------------------------------------------------------------------------------------------------  Current Hospital Meds:  acetylcysteine, 3 mL, Nebulization, BID - RT  apixaban, 5 mg, Oral, BID  ascorbic acid, 250 mg, Oral, Daily  atorvastatin, 20 mg, Oral, Nightly  cefepime, 2,000 mg, Intravenous, Q12H  ferrous sulfate, 325 mg, Oral, Daily With Breakfast  fluticasone, 2 spray, Each Nare, Daily  folic acid, 1 mg, Oral, Daily  insulin lispro, 2-7 Units, Subcutaneous, 4x Daily AC & at Bedtime  ipratropium-albuterol, 3 mL, Nebulization, TID - RT  metroNIDAZOLE, 500 mg, Intravenous, Q8H  pantoprazole, 40 mg, Oral, Q AM  sertraline, 100 mg, Oral, Daily  sodium chloride, 10 mL, Intravenous, Q12H  vitamin B-1, 200 mg, Oral, Q8H  vancomycin, 1,250 mg, Intravenous, Q18H         ----------------------------------------------------------------------------------------------------------------------  Vital Signs:  Temp:  [97.6 °F (36.4 °C)-97.8 °F (36.6 °C)] 97.6 °F (36.4 °C)  Heart Rate:  [] 107  Resp:  [18-20] 18  BP: (133-150)/(72-92) 150/86       Tele:       10/23/24  0500 10/24/24  0500 10/25/24  0500   Weight: 88.8 kg (195 lb 11.2 oz) 86 kg (189 lb 11.2 oz) 86 kg (189 lb 11.2 oz)     Body mass index is 28.01 kg/m².    Intake/Output Summary (Last 24 hours) at 10/25/2024 4619  Last data filed at  10/25/2024 1349  Gross per 24 hour   Intake 1356.69 ml   Output 975 ml   Net 381.69 ml     Diet: Regular/House, Fluid Restriction (240 mL/tray); 1000 mL/day; Texture: Soft to Chew (NDD 3); Soft to Chew: Whole Meat; Fluid Consistency: Nectar Thick  ----------------------------------------------------------------------------------------------------------------------  Physical exam:  General: Chronically ill-appearing,  No respiratory distress.    Skin:  Skin is warm and dry. No rash noted. No pallor.    HENT: Deformity of the face with multiple superficial ulceration from previous cell carcinoma.  No oral thrush.   Eyes:  Conjunctivae and EOM are normal.  Pupils are equal, round, and reactive to light.  No scleral icterus.    Neck:  Neck supple.  No JVD present.    Pulmonary/Chest: Scattered wheezing expiratory inspiratory on the left side, occasional rhonchorous breath sounds, good air movement.    Cardiovascular:  Normal rate, regular rhythm and normal heart sounds with no murmur.  Abdominal:  Soft.  Bowel sounds are normal.  No distension and no tenderness.   Extremities:  No edema, no tenderness, and no deformity.  No red or swollen joints anywhere.  Strong pulses in all 4 extremities with no clubbing, no cyanosis, no edema.  Neurological:  Motor strength equal no obvious deficit, sensory grossly intact.   No cranial nerve deficit.  No tongue deviation.  No facial droop.  No slurred speech.    Genitourinary: No Santana catheter  Back:  ----------------------------------------------------------------------------------------------------------------------  ----------------------------------------------------------------------------------------------------------------------  Results from last 7 days   Lab Units 10/20/24  1309 10/20/24  1110   HSTROP T ng/L 29* 27*     Results from last 7 days   Lab Units 10/23/24  0657 10/22/24  0153 10/21/24  0104 10/20/24  1110   CRP mg/dL  --   --   --  12.93*   LACTATE mmol/L  --    "--   --  2.0   WBC 10*3/mm3 14.54* 11.45* 11.81* 14.19*   HEMOGLOBIN g/dL 8.6* 8.1* 8.0* 9.0*   HEMATOCRIT % 28.7* 27.1* 26.9* 29.9*   MCV fL 88.9 88.3 87.1 86.9   MCHC g/dL 30.0* 29.9* 29.7* 30.1*   PLATELETS 10*3/mm3 403 352 386 433   INR   --   --   --  1.71*     Results from last 7 days   Lab Units 10/23/24  0651   PH, ARTERIAL pH units 7.393   PO2 ART mm Hg 83.3   PCO2, ARTERIAL mm Hg 41.6   HCO3 ART mmol/L 25.3     Results from last 7 days   Lab Units 10/23/24  0657 10/22/24  0154 10/21/24  0104 10/20/24  1110   SODIUM mmol/L 136 129* 128* 124*   POTASSIUM mmol/L 4.7 4.9 4.6 4.6   MAGNESIUM mg/dL  --   --  1.8  --    CHLORIDE mmol/L 102 96* 93* 88*   CO2 mmol/L 24.8 25.3 26.6 25.5   BUN mg/dL 26* 24* 18 16   CREATININE mg/dL 1.10 1.33* 1.07 0.93   CALCIUM mg/dL 9.8 9.9 10.0 10.3   GLUCOSE mg/dL 150* 150* 68 163*   ALBUMIN g/dL 2.9*  --  2.7* 3.1*   BILIRUBIN mg/dL 0.3  --  0.2 0.3   ALK PHOS U/L 84  --  82 93   AST (SGOT) U/L 25  --  22 24   ALT (SGPT) U/L 29  --  24 28   Estimated Creatinine Clearance: 55.3 mL/min (by C-G formula based on SCr of 1.1 mg/dL).    No results found for: \"AMMONIA\"      Blood Culture   Date Value Ref Range Status   10/22/2024 No growth at 3 days  Preliminary   10/22/2024 No growth at 3 days  Preliminary   10/20/2024 No growth at 5 days  Final   10/20/2024 No growth at 5 days  Final     No results found for: \"URINECX\"  No results found for: \"WOUNDCX\"  No results found for: \"STOOLCX\"    I have personally looked at the labs and they are summarized above.  ----------------------------------------------------------------------------------------------------------------------  Imaging Results (Last 24 Hours)       ** No results found for the last 24 hours. 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 intake, he reports loss of appetite, will start the patient on protein supplements 4 times a day with boost, PT OT, patient appears to be poorly motivated participating.  Awaiting for bed availability at [facility] where he is accepted.    Patient and daughter informed of update regarding acceptance for transfer.      Disposition awaiting for bed      Nurys Campos MD  10/25/24  14:59 EDT

## 2024-10-25 NOTE — THERAPY TREATMENT NOTE
Acute Care - Speech Language Pathology   Swallow Treatment Note  Dennis     Patient Name: Tyler Andrade  : 1940  MRN: [id number]  Today's Date: 10/25/2024  Onset of Illness/Injury or Date of Surgery: 10/20/24     Referring Physician: Beth      Admit Date: 10/20/2024    Visit Dx:     ICD-10-CM ICD-9-CM   1. Pneumonia of left lung due to infectious organism, unspecified part of lung  J18.9 486     Patient Active Problem List   Diagnosis    Basal cell carcinoma    Idiopathic polypoidal choroidal vasculopathy    Lung mass    NSCLC of left lung    Macular degeneration    Old myocardial infarction    Personal history of other endocrine, nutritional and metabolic disease    Diabetic retinopathy    Retinal hemorrhage    Squamous cell lung cancer, left    Inability to walk    Aspiration pneumonia     History reviewed. No pertinent past medical history.  History reviewed. No pertinent surgical history.  DYSPHAGIA THERAPY PLAN OF CARE:     Tyler Andrade was seen at bedside this date in Rm [id number]. Pt requested to remain reclined in bed for tx session. Pt observed on 2L O2 via NC.     Long Term Goal:  Patient will accept least restrictive diet tolerance w/o overt s/s aspiration.      Short Term Goals:  1. Patient will perform OROM/KELLY exercises x3 sets x10 reps w/ min cues.  *Pt completes OMEs x2 sets x5 reps. Pt declines further tx treatment stating he does not feel well at this time.      2. Patient will perform resistive breathing exercises x7sets x7 reps w/resistance of 1-5 to improve respiratory support and control.   Deferred.     3. Patient will perform CTAR exercises sustained x3 sets x5 reps for 30+ seconds over 3 consecutive sessions.   *deferred.      4. Patient will perform CTAR exercises repetitive x3 sets x12 reps w/ mod cues.   *deferred.      5. Patient will perform compensatory techniques during meals w/ min cues.   *SLP d/w pt strategies to reduce risk of aspiration including explanation of  thickened liquids and how to thicken to nectar consistency.     6. Patient will accept ice/chip water per protocol between meals and medications for oral hydration/comfort with assistance.   *Pt educated on water protocol. Pt with silent aspiration established with MBS 10/21/24.     7. Patient will participate in instrumental re-evaluation of swallowing fnx in 3-5 days, pending progress towards this poc.   *Pt with MBS 10/21/24  *Pt with FEES 10/22/24     SLP Recommendation and Plan     1. Continue POC.   Thank you for allowing me to participate in the care of your patient-  Celestina Gallardo M.A., CCC-SLP     EDUCATION  The patient has been educated in the following areas:   Dysphagia (Swallowing Impairment) Oral Care/Hydration Modified Diet Instruction.     EDUCATION  The patient has been educated in the following areas:   Dysphagia (Swallowing Impairment) Oral Care/Hydration Modified Diet Instruction.     Time Calculation:    Time Calculation- SLP       Row Name 10/25/24 1247             Time Calculation- SLP    SLP - Next Appointment 10/28/24  -                User Key  (r) = Recorded By, (t) = Taken By, (c) = Cosigned By      Initials Name Provider Type    SS Celestina Gallardo MA,CCC-SLP Speech and Language Pathologist                  Therapy Charges for Today       Code Description Service Date Service Provider Modifiers Qty    [id number] HC ST TREATMENT SWALLOW 1 10/25/2024 Celestina Gallardo MA,CCC-SLP GN 1          Celestina Gallardo MA,CCC-SLP  10/25/2024

## 2024-10-25 NOTE — PLAN OF CARE
Goal Outcome Evaluation:   Pt is in the bed. No acute distress. Pt educated on the importance of eating. VSS. Will continue to monitor. Will continue plan of care. Wound care completed. No signs of tenderness, infection, drainage.

## 2024-10-26 LAB
ANION GAP SERPL CALCULATED.3IONS-SCNC: 7.3 MMOL/L (ref 5–15)
BASOPHILS # BLD AUTO: 0.04 10*3/MM3 (ref 0–0.2)
BASOPHILS NFR BLD AUTO: 0.3 % (ref 0–1.5)
BUN SERPL-MCNC: 20 MG/DL (ref 8–23)
BUN/CREAT SERPL: 27.4 (ref 7–25)
CALCIUM SPEC-SCNC: 10.3 MG/DL (ref 8.6–10.5)
CHLORIDE SERPL-SCNC: 100 MMOL/L (ref 98–107)
CO2 SERPL-SCNC: 24.7 MMOL/L (ref 22–29)
CREAT SERPL-MCNC: 0.73 MG/DL (ref 0.76–1.27)
DEPRECATED RDW RBC AUTO: 61.2 FL (ref 37–54)
EGFRCR SERPLBLD CKD-EPI 2021: 90.3 ML/MIN/1.73
EOSINOPHIL # BLD AUTO: 0.06 10*3/MM3 (ref 0–0.4)
EOSINOPHIL NFR BLD AUTO: 0.4 % (ref 0.3–6.2)
ERYTHROCYTE [DISTWIDTH] IN BLOOD BY AUTOMATED COUNT: 18.6 % (ref 12.3–15.4)
GLUCOSE BLDC GLUCOMTR-MCNC: 150 MG/DL (ref 70–130)
GLUCOSE BLDC GLUCOMTR-MCNC: 173 MG/DL (ref 70–130)
GLUCOSE BLDC GLUCOMTR-MCNC: 186 MG/DL (ref 70–130)
GLUCOSE BLDC GLUCOMTR-MCNC: 186 MG/DL (ref 70–130)
GLUCOSE SERPL-MCNC: 141 MG/DL (ref 65–99)
HCT VFR BLD AUTO: 29.2 % (ref 37.5–51)
HGB BLD-MCNC: 8.5 G/DL (ref 13–17.7)
IMM GRANULOCYTES # BLD AUTO: 0.11 10*3/MM3 (ref 0–0.05)
IMM GRANULOCYTES NFR BLD AUTO: 0.8 % (ref 0–0.5)
LYMPHOCYTES # BLD AUTO: 0.55 10*3/MM3 (ref 0.7–3.1)
LYMPHOCYTES NFR BLD AUTO: 3.8 % (ref 19.6–45.3)
MCH RBC QN AUTO: 26.2 PG (ref 26.6–33)
MCHC RBC AUTO-ENTMCNC: 29.1 G/DL (ref 31.5–35.7)
MCV RBC AUTO: 89.8 FL (ref 79–97)
MONOCYTES # BLD AUTO: 0.99 10*3/MM3 (ref 0.1–0.9)
MONOCYTES NFR BLD AUTO: 6.8 % (ref 5–12)
NEUTROPHILS NFR BLD AUTO: 12.86 10*3/MM3 (ref 1.7–7)
NEUTROPHILS NFR BLD AUTO: 87.9 % (ref 42.7–76)
NRBC BLD AUTO-RTO: 0 /100 WBC (ref 0–0.2)
PLATELET # BLD AUTO: 389 10*3/MM3 (ref 140–450)
PMV BLD AUTO: 8.4 FL (ref 6–12)
POTASSIUM SERPL-SCNC: 4.2 MMOL/L (ref 3.5–5.2)
RBC # BLD AUTO: 3.25 10*6/MM3 (ref 4.14–5.8)
SODIUM SERPL-SCNC: 132 MMOL/L (ref 136–145)
WBC NRBC COR # BLD AUTO: 14.61 10*3/MM3 (ref 3.4–10.8)

## 2024-10-26 PROCEDURE — 25810000003 SODIUM CHLORIDE 0.9 % SOLUTION: Performed by: HOSPITALIST

## 2024-10-26 PROCEDURE — 82948 REAGENT STRIP/BLOOD GLUCOSE: CPT

## 2024-10-26 PROCEDURE — 25010000002 CEFEPIME PER 500 MG: Performed by: HOSPITALIST

## 2024-10-26 PROCEDURE — 80048 BASIC METABOLIC PNL TOTAL CA: CPT

## 2024-10-26 PROCEDURE — 94799 UNLISTED PULMONARY SVC/PX: CPT

## 2024-10-26 PROCEDURE — 25010000002 VANCOMYCIN 5 G RECONSTITUTED SOLUTION: Performed by: HOSPITALIST

## 2024-10-26 PROCEDURE — 25010000002 METRONIDAZOLE 500 MG/100ML SOLUTION: Performed by: HOSPITALIST

## 2024-10-26 PROCEDURE — 63710000001 ONDANSETRON ODT 4 MG TABLET DISPERSIBLE: Performed by: HOSPITALIST

## 2024-10-26 PROCEDURE — 94761 N-INVAS EAR/PLS OXIMETRY MLT: CPT

## 2024-10-26 PROCEDURE — 94664 DEMO&/EVAL PT USE INHALER: CPT

## 2024-10-26 PROCEDURE — 63710000001 INSULIN LISPRO (HUMAN) PER 5 UNITS

## 2024-10-26 PROCEDURE — 85025 COMPLETE CBC W/AUTO DIFF WBC: CPT | Performed by: HOSPITALIST

## 2024-10-26 PROCEDURE — 99232 SBSQ HOSP IP/OBS MODERATE 35: CPT | Performed by: HOSPITALIST

## 2024-10-26 RX ORDER — MEGESTROL ACETATE 40 MG/ML
400 SUSPENSION ORAL DAILY
Status: DISCONTINUED | OUTPATIENT
Start: 2024-10-26 | End: 2024-10-27 | Stop reason: HOSPADM

## 2024-10-26 RX ORDER — ONDANSETRON 4 MG/1
4 TABLET, ORALLY DISINTEGRATING ORAL EVERY 6 HOURS PRN
Status: DISCONTINUED | OUTPATIENT
Start: 2024-10-26 | End: 2024-10-27 | Stop reason: HOSPADM

## 2024-10-26 RX ADMIN — Medication 200 MG: at 14:22

## 2024-10-26 RX ADMIN — Medication 200 MG: at 05:38

## 2024-10-26 RX ADMIN — ACETYLCYSTEINE 3 ML: 200 SOLUTION ORAL; RESPIRATORY (INHALATION) at 18:44

## 2024-10-26 RX ADMIN — ATORVASTATIN CALCIUM 20 MG: 20 TABLET, FILM COATED ORAL at 22:00

## 2024-10-26 RX ADMIN — APIXABAN 5 MG: 5 TABLET, FILM COATED ORAL at 22:00

## 2024-10-26 RX ADMIN — MEGESTROL ACETATE 400 MG: 40 SUSPENSION ORAL at 14:22

## 2024-10-26 RX ADMIN — OXYCODONE HYDROCHLORIDE AND ACETAMINOPHEN 250 MG: 500 TABLET ORAL at 08:18

## 2024-10-26 RX ADMIN — PANTOPRAZOLE SODIUM 40 MG: 40 TABLET, DELAYED RELEASE ORAL at 05:38

## 2024-10-26 RX ADMIN — FOLIC ACID 1 MG: 1 TABLET ORAL at 08:18

## 2024-10-26 RX ADMIN — METRONIDAZOLE 500 MG: 500 INJECTION, SOLUTION INTRAVENOUS at 21:57

## 2024-10-26 RX ADMIN — APIXABAN 5 MG: 5 TABLET, FILM COATED ORAL at 08:18

## 2024-10-26 RX ADMIN — METRONIDAZOLE 500 MG: 500 INJECTION, SOLUTION INTRAVENOUS at 04:26

## 2024-10-26 RX ADMIN — FERROUS SULFATE TAB 325 MG (65 MG ELEMENTAL FE) 325 MG: 325 (65 FE) TAB at 08:18

## 2024-10-26 RX ADMIN — METRONIDAZOLE 500 MG: 500 INJECTION, SOLUTION INTRAVENOUS at 12:48

## 2024-10-26 RX ADMIN — INSULIN LISPRO 2 UNITS: 100 INJECTION, SOLUTION INTRAVENOUS; SUBCUTANEOUS at 10:51

## 2024-10-26 RX ADMIN — IPRATROPIUM BROMIDE AND ALBUTEROL SULFATE 3 ML: 2.5; .5 SOLUTION RESPIRATORY (INHALATION) at 06:54

## 2024-10-26 RX ADMIN — INSULIN LISPRO 2 UNITS: 100 INJECTION, SOLUTION INTRAVENOUS; SUBCUTANEOUS at 08:18

## 2024-10-26 RX ADMIN — INSULIN LISPRO 2 UNITS: 100 INJECTION, SOLUTION INTRAVENOUS; SUBCUTANEOUS at 22:00

## 2024-10-26 RX ADMIN — Medication 10 ML: at 08:19

## 2024-10-26 RX ADMIN — CEFEPIME 2000 MG: 2 INJECTION, POWDER, FOR SOLUTION INTRAVENOUS at 14:22

## 2024-10-26 RX ADMIN — ACETYLCYSTEINE 3 ML: 200 SOLUTION ORAL; RESPIRATORY (INHALATION) at 06:54

## 2024-10-26 RX ADMIN — INSULIN LISPRO 2 UNITS: 100 INJECTION, SOLUTION INTRAVENOUS; SUBCUTANEOUS at 17:28

## 2024-10-26 RX ADMIN — ONDANSETRON 4 MG: 4 TABLET, ORALLY DISINTEGRATING ORAL at 14:22

## 2024-10-26 RX ADMIN — Medication 10 ML: at 21:58

## 2024-10-26 RX ADMIN — FLUTICASONE PROPIONATE 2 SPRAY: 50 SPRAY, METERED NASAL at 08:19

## 2024-10-26 RX ADMIN — VANCOMYCIN HYDROCHLORIDE 1250 MG: 5 INJECTION, POWDER, LYOPHILIZED, FOR SOLUTION INTRAVENOUS at 10:51

## 2024-10-26 RX ADMIN — IPRATROPIUM BROMIDE AND ALBUTEROL SULFATE 3 ML: 2.5; .5 SOLUTION RESPIRATORY (INHALATION) at 18:44

## 2024-10-26 RX ADMIN — SERTRALINE 100 MG: 50 TABLET, FILM COATED ORAL at 08:18

## 2024-10-26 RX ADMIN — CEFEPIME 2000 MG: 2 INJECTION, POWDER, FOR SOLUTION INTRAVENOUS at 01:19

## 2024-10-26 RX ADMIN — VANCOMYCIN HYDROCHLORIDE 1250 MG: 5 INJECTION, POWDER, LYOPHILIZED, FOR SOLUTION INTRAVENOUS at 23:32

## 2024-10-26 RX ADMIN — Medication 200 MG: at 22:00

## 2024-10-26 NOTE — PLAN OF CARE
"Goal Outcome Evaluation:              Patient laying in bed awake with no complaints at this time. VSS on 2L NC. Kulwant refused bath thist shift. Educated patient on the importance of hygiene, kulwant stated \"I am clean, i do not need cleaned up\". will continue plan of care.                           "

## 2024-10-26 NOTE — PROGRESS NOTES
[facility] HOSPITALIST PROGRESS NOTE     Patient Identification:  Name:  Tyler Andraed  Age:  84 y.o.  Sex:  male  :  1940  MRN:  [id number]  Visit Number:  [id number]  Primary Care Provider:  Skyla Faustin MD    Length of stay:  4    Subjective: Patient seen and examined sitting at the edge of the bed, dysphonic, still not eating, stating he has no appetite, will start Megace today.  Still having difficulty with pulmonary toilet and expectoration due to dysphonia, continues to take Mucomyst nebs and chest percussion.    Chief Complaint: Coughing  ----------------------------------------------------------------------------------------------------------------------  Current Hospital Meds:  acetylcysteine, 3 mL, Nebulization, BID - RT  apixaban, 5 mg, Oral, BID  ascorbic acid, 250 mg, Oral, Daily  atorvastatin, 20 mg, Oral, Nightly  cefepime, 2,000 mg, Intravenous, Q12H  ferrous sulfate, 325 mg, Oral, Daily With Breakfast  fluticasone, 2 spray, Each Nare, Daily  folic acid, 1 mg, Oral, Daily  insulin lispro, 2-7 Units, Subcutaneous, 4x Daily AC & at Bedtime  ipratropium-albuterol, 3 mL, Nebulization, TID - RT  megestrol, 400 mg, Oral, Daily  metroNIDAZOLE, 500 mg, Intravenous, Q8H  pantoprazole, 40 mg, Oral, Q AM  sertraline, 100 mg, Oral, Daily  sodium chloride, 10 mL, Intravenous, Q12H  vitamin B-1, 200 mg, Oral, Q8H  vancomycin, 1,250 mg, Intravenous, Q12H         ----------------------------------------------------------------------------------------------------------------------  Vital Signs:  Temp:  [97.3 °F (36.3 °C)-97.8 °F (36.6 °C)] 97.3 °F (36.3 °C)  Heart Rate:  [] 113  Resp:  [18-22] 20  BP: (100-169)/(56-95) 133/85       Tele:       10/24/24  0500 10/25/24  0500 10/26/24  0500   Weight: 86 kg (189 lb 11.2 oz) 86 kg (189 lb 11.2 oz) 86 kg (189 lb 11.2 oz)     Body mass index is 28.01 kg/m².    Intake/Output Summary (Last 24 hours) at 10/26/2024 1213  Last data  filed at 10/26/2024 0923  Gross per 24 hour   Intake 1561.44 ml   Output 1200 ml   Net 361.44 ml     Diet: Regular/House, Fluid Restriction (240 mL/tray); 1000 mL/day; Texture: Soft to Chew (NDD 3); Soft to Chew: Whole Meat; Fluid Consistency: Nectar Thick  ----------------------------------------------------------------------------------------------------------------------  Physical exam:  General: Sitting at the edge of bed, chronically ill-appearing, conversant, comfortable,awake, alert, oriented to self, place, and time,  No respiratory distress.    Skin:  Skin is warm and dry. No rash noted. No pallor.    HENT: Deformed face from previous basal cell carcinoma and radiation.  Head:  Normocephalic and atraumatic.  Mouth:  Moist mucous membranes.    Eyes:  Conjunctivae and EOM are normal.  Pupils are equal, round, and reactive to light.  No scleral icterus.    Neck:  Neck supple.  No JVD present.    Pulmonary/Chest: Lung sounds better today no wheezing, scattered rhonchi particular the left, equal chest expansion good air movement.  Cardiovascular:  Normal rate, regular rhythm and normal heart sounds with no murmur.  Abdominal:  Soft.  Bowel sounds are normal.  No distension and no tenderness.   Extremities:  No edema, no tenderness, and no deformity.  No red or swollen joints anywhere.  Strong pulses in all 4 extremities with no clubbing, no cyanosis, no edema.  Neurological:  Motor strength equal no obvious deficit, sensory grossly intact.   No cranial nerve deficit.  No tongue deviation.  No facial droop.  No slurred speech.    Genitourinary: No Santana catheter  Back:  ----------------------------------------------------------------------------------------------------------------------  ----------------------------------------------------------------------------------------------------------------------  Results from last 7 days   Lab Units 10/20/24  1309 10/20/24  1110   HSTROP T ng/L 29* 27*     Results from  "last 7 days   Lab Units 10/26/24  0204 10/23/24  0657 10/22/24  0153 10/21/24  0104 10/20/24  1110   CRP mg/dL  --   --   --   --  12.93*   LACTATE mmol/L  --   --   --   --  2.0   WBC 10*3/mm3 14.61* 14.54* 11.45*   < > 14.19*   HEMOGLOBIN g/dL 8.5* 8.6* 8.1*   < > 9.0*   HEMATOCRIT % 29.2* 28.7* 27.1*   < > 29.9*   MCV fL 89.8 88.9 88.3   < > 86.9   MCHC g/dL 29.1* 30.0* 29.9*   < > 30.1*   PLATELETS 10*3/mm3 389 403 352   < > 433   INR   --   --   --   --  1.71*    < > = values in this interval not displayed.     Results from last 7 days   Lab Units 10/23/24  0651   PH, ARTERIAL pH units 7.393   PO2 ART mm Hg 83.3   PCO2, ARTERIAL mm Hg 41.6   HCO3 ART mmol/L 25.3     Results from last 7 days   Lab Units 10/26/24  0204 10/23/24  0657 10/22/24  0154 10/21/24  0104 10/20/24  1110   SODIUM mmol/L 132* 136 129* 128* 124*   POTASSIUM mmol/L 4.2 4.7 4.9 4.6 4.6   MAGNESIUM mg/dL  --   --   --  1.8  --    CHLORIDE mmol/L 100 102 96* 93* 88*   CO2 mmol/L 24.7 24.8 25.3 26.6 25.5   BUN mg/dL 20 26* 24* 18 16   CREATININE mg/dL 0.73* 1.10 1.33* 1.07 0.93   CALCIUM mg/dL 10.3 9.8 9.9 10.0 10.3   GLUCOSE mg/dL 141* 150* 150* 68 163*   ALBUMIN g/dL  --  2.9*  --  2.7* 3.1*   BILIRUBIN mg/dL  --  0.3  --  0.2 0.3   ALK PHOS U/L  --  84  --  82 93   AST (SGOT) U/L  --  25  --  22 24   ALT (SGPT) U/L  --  29  --  24 28   Estimated Creatinine Clearance: 83.3 mL/min (A) (by C-G formula based on SCr of 0.73 mg/dL (L)).    No results found for: \"AMMONIA\"      Blood Culture   Date Value Ref Range Status   10/22/2024 No growth at 3 days  Preliminary   10/22/2024 No growth at 3 days  Preliminary   10/20/2024 No growth at 5 days  Final   10/20/2024 No growth at 5 days  Final     No results found for: \"URINECX\"  No results found for: \"WOUNDCX\"  No results found for: \"STOOLCX\"    I have personally looked at the labs and they are summarized " above.  ----------------------------------------------------------------------------------------------------------------------  Imaging Results (Last 24 Hours)       ** No results found for the last 24 hours. **          ----------------------------------------------------------------------------------------------------------------------  Assessment and Plan:  -Persistent cough with lung cancer  -Abnormal CT of the chest with multiple densities from non-small cell CA rule out postobstructive pneumonia cultures unremarkable  -Chronic debility  -Malnutrition, severity to be evaluated by dietitian  -Chronic anticoagulation for stroke prophylaxis  -History of paroxysmal atrial fibrillation  -Anorexia  -Dysphonia with silent aspiration secondary to left sided cord paralysis  -History of facial basal cell carcinoma completed radiation treatment    Trial of Megace, patient encouraged supplements, encourage participation with PT OT, patient is accepted at [facility] awaiting bed for further management and treatment of non-small cell CA of the lungs.    Patient's daughter updated on phone and agreed with plan of care.    Disposition transferred awaiting bed      Nurys Campos MD  10/26/24  12:13 EDT

## 2024-10-26 NOTE — PLAN OF CARE
Goal Outcome Evaluation:   Pt is resting in the bed. [facility] called and stated that pt has been accepted to [facility], but still waiting on bed. No acute changes. VSS. PRN and routine meds given. Tolerated routine and Prn meds. Will continue to monitor. Will continue plan of care.

## 2024-10-26 NOTE — PROGRESS NOTES
Vancomycin dose was adjusted to 1250mg iv q12hrs due to improved renal function to target AUC of 400-600. Pharmacy will continue to follow and obtain trough level once steady state is achieved. Thank you.

## 2024-10-27 VITALS
HEART RATE: 111 BPM | DIASTOLIC BLOOD PRESSURE: 73 MMHG | TEMPERATURE: 97.8 F | OXYGEN SATURATION: 98 % | SYSTOLIC BLOOD PRESSURE: 118 MMHG | WEIGHT: 189.7 LBS | HEIGHT: 69 IN | RESPIRATION RATE: 22 BRPM | BODY MASS INDEX: 28.1 KG/M2

## 2024-10-27 LAB
BACTERIA SPEC AEROBE CULT: NORMAL
BACTERIA SPEC AEROBE CULT: NORMAL
GLUCOSE BLDC GLUCOMTR-MCNC: 188 MG/DL (ref 70–130)
GLUCOSE BLDC GLUCOMTR-MCNC: 204 MG/DL (ref 70–130)
GLUCOSE BLDC GLUCOMTR-MCNC: 253 MG/DL (ref 70–130)
GLUCOSE BLDC GLUCOMTR-MCNC: 286 MG/DL (ref 70–130)

## 2024-10-27 PROCEDURE — 82948 REAGENT STRIP/BLOOD GLUCOSE: CPT

## 2024-10-27 PROCEDURE — 94664 DEMO&/EVAL PT USE INHALER: CPT

## 2024-10-27 PROCEDURE — 25010000002 CEFEPIME PER 500 MG: Performed by: HOSPITALIST

## 2024-10-27 PROCEDURE — 94761 N-INVAS EAR/PLS OXIMETRY MLT: CPT

## 2024-10-27 PROCEDURE — 25010000002 METRONIDAZOLE 500 MG/100ML SOLUTION: Performed by: HOSPITALIST

## 2024-10-27 PROCEDURE — 94799 UNLISTED PULMONARY SVC/PX: CPT

## 2024-10-27 PROCEDURE — 25010000002 VANCOMYCIN 5 G RECONSTITUTED SOLUTION: Performed by: HOSPITALIST

## 2024-10-27 PROCEDURE — 25810000003 SODIUM CHLORIDE 0.9 % SOLUTION: Performed by: HOSPITALIST

## 2024-10-27 PROCEDURE — 63710000001 INSULIN LISPRO (HUMAN) PER 5 UNITS

## 2024-10-27 PROCEDURE — 99239 HOSP IP/OBS DSCHRG MGMT >30: CPT | Performed by: HOSPITALIST

## 2024-10-27 RX ORDER — MEGESTROL ACETATE 40 MG/ML
400 SUSPENSION ORAL DAILY
Start: 2024-10-28

## 2024-10-27 RX ORDER — BENZONATATE 100 MG/1
100 CAPSULE ORAL 3 TIMES DAILY PRN
Start: 2024-10-27

## 2024-10-27 RX ORDER — INSULIN LISPRO 100 [IU]/ML
2-7 INJECTION, SOLUTION INTRAVENOUS; SUBCUTANEOUS
Start: 2024-10-27

## 2024-10-27 RX ORDER — GUAIFENESIN 200 MG/10ML
200 LIQUID ORAL EVERY 4 HOURS PRN
Start: 2024-10-27

## 2024-10-27 RX ORDER — IPRATROPIUM BROMIDE AND ALBUTEROL SULFATE 2.5; .5 MG/3ML; MG/3ML
3 SOLUTION RESPIRATORY (INHALATION)
Start: 2024-10-27

## 2024-10-27 RX ADMIN — Medication 200 MG: at 17:13

## 2024-10-27 RX ADMIN — Medication 10 ML: at 08:34

## 2024-10-27 RX ADMIN — IPRATROPIUM BROMIDE AND ALBUTEROL SULFATE 3 ML: 2.5; .5 SOLUTION RESPIRATORY (INHALATION) at 06:54

## 2024-10-27 RX ADMIN — FLUTICASONE PROPIONATE 2 SPRAY: 50 SPRAY, METERED NASAL at 08:34

## 2024-10-27 RX ADMIN — METRONIDAZOLE 500 MG: 500 INJECTION, SOLUTION INTRAVENOUS at 13:16

## 2024-10-27 RX ADMIN — MEGESTROL ACETATE 400 MG: 40 SUSPENSION ORAL at 08:33

## 2024-10-27 RX ADMIN — PANTOPRAZOLE SODIUM 40 MG: 40 TABLET, DELAYED RELEASE ORAL at 05:38

## 2024-10-27 RX ADMIN — INSULIN LISPRO 2 UNITS: 100 INJECTION, SOLUTION INTRAVENOUS; SUBCUTANEOUS at 17:13

## 2024-10-27 RX ADMIN — ACETYLCYSTEINE 3 ML: 200 SOLUTION ORAL; RESPIRATORY (INHALATION) at 18:47

## 2024-10-27 RX ADMIN — CEFEPIME 2000 MG: 2 INJECTION, POWDER, FOR SOLUTION INTRAVENOUS at 13:52

## 2024-10-27 RX ADMIN — OXYCODONE HYDROCHLORIDE AND ACETAMINOPHEN 250 MG: 500 TABLET ORAL at 08:33

## 2024-10-27 RX ADMIN — APIXABAN 5 MG: 5 TABLET, FILM COATED ORAL at 08:33

## 2024-10-27 RX ADMIN — VANCOMYCIN HYDROCHLORIDE 1250 MG: 5 INJECTION, POWDER, LYOPHILIZED, FOR SOLUTION INTRAVENOUS at 11:54

## 2024-10-27 RX ADMIN — ACETYLCYSTEINE 3 ML: 200 SOLUTION ORAL; RESPIRATORY (INHALATION) at 06:54

## 2024-10-27 RX ADMIN — INSULIN LISPRO 3 UNITS: 100 INJECTION, SOLUTION INTRAVENOUS; SUBCUTANEOUS at 11:44

## 2024-10-27 RX ADMIN — IPRATROPIUM BROMIDE AND ALBUTEROL SULFATE 3 ML: 2.5; .5 SOLUTION RESPIRATORY (INHALATION) at 18:47

## 2024-10-27 RX ADMIN — BISACODYL 5 MG: 5 TABLET, COATED ORAL at 13:52

## 2024-10-27 RX ADMIN — FOLIC ACID 1 MG: 1 TABLET ORAL at 08:34

## 2024-10-27 RX ADMIN — FERROUS SULFATE TAB 325 MG (65 MG ELEMENTAL FE) 325 MG: 325 (65 FE) TAB at 08:33

## 2024-10-27 RX ADMIN — METRONIDAZOLE 500 MG: 500 INJECTION, SOLUTION INTRAVENOUS at 04:03

## 2024-10-27 RX ADMIN — CEFEPIME 2000 MG: 2 INJECTION, POWDER, FOR SOLUTION INTRAVENOUS at 01:13

## 2024-10-27 RX ADMIN — SERTRALINE 100 MG: 50 TABLET, FILM COATED ORAL at 08:34

## 2024-10-27 RX ADMIN — INSULIN LISPRO 3 UNITS: 100 INJECTION, SOLUTION INTRAVENOUS; SUBCUTANEOUS at 08:33

## 2024-10-27 RX ADMIN — Medication 200 MG: at 05:38

## 2024-10-27 NOTE — NURSING NOTE
[facility] states they only have 2 trucks today and are unable to transport patient.  Call placed to [facility], they will return call

## 2024-10-27 NOTE — NURSING NOTE
Calls placed to [facility] and [facility], neither have a crew available for transport     UTI (urinary tract infection), uncomplicated

## 2024-10-27 NOTE — NURSING NOTE
1030  Called [facility] again and they state they are down trucks and can not take patient to [facility].    1035 Call placed to [facility], they are unable to transfer patient to [facility].

## 2024-10-27 NOTE — NURSING NOTE
1855 Call placed to [facility] for transport, they have accepted flight and will arrive in approximately 13 minutes.      1905  Call placed to Zaynab at [facility] to make aware that patient is being flown to facility

## 2024-10-27 NOTE — PLAN OF CARE
Goal Outcome Evaluation:  Plan of Care Reviewed With: patient        Progress: no change  Outcome Evaluation: Patient resting in bed at this time. VSS on 2LNC. Patient voices no concerns or complaints at this time. Will continue plan of care.

## 2024-10-27 NOTE — DISCHARGE SUMMARY
[facility] HOSPITALIST MEDICINE DISCHARGE SUMMARY    Patient Identification:  Name:  Tyler Andrade  Age:  84 y.o.  Sex:  male  :  1940  MRN:  [id number]  Visit Number:  [id number]    Date of Admission: 10/20/2024  Date of Discharge: 2024  DISCHARGE DISPOSITION   Stable  PCP: Skyla Faustin MD    DISCHARGE DIAGNOSIS : Debility secondary to lung cancer, history of lung cancer non-small cell squamous type.  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    HOSPITAL COURSE  Patient is a 84 y.o. male presented to [facility] complaining of generalized weakness poor appetite, patient has squamous cell carcinoma of the lungs who is being followed by [facility] cancer clinic.  He is scheduled to have radiation treatment but because of his debility generalized weakness and poor appetite he presented emergency room.  Workup included CT scan of the brain negative for stroke, CT of the chest revealed dense consolidation superior segment of the left lower lobe with central cavitation concerning for pneumonia, there is also large mediastinal lymph node, small right pleural effusion.  Left pleural effusion.  He was admitted empirically for possible pneumonia, cultures were nonyielding, he remains afebrile, he is very debilitated, on further evaluation patient noted to be dysphonic, he stated that he has been going on for approximately 3 months, special for aspiration was entertained which was confirmed by speech therapy evaluation.  As for the reason for his dysphonia, ENT is not  available at our facility, we did manage to visualize his vocal cord with fiberoptic camera from speech therapy and confirmed suspicion of left vocal cord paralysis.  Because of dysphonia is unclear but suspected related to cancer of his lungs although radiation can also potentially be the cause but based on history most likely his cancer.  During his stay appetite has been very poor, his fluid consistency was changed to nectar thickened as recommended by speech therapy, anorexia was addressed with trial of Megace.  Despite antibiotic treatment, neb treatment, Mucomyst and chest percussion, patient has difficulty expectorating and clearing his congestion.  Case was discussed with [facility] at [facility] Dr. Ovalles, he recommended the patient if he can be transferred and hopefully get his radiation treatment.  [facility] transfer service was contacted and agreed to accept the patient for specialty access including ENT, radiation oncology, palliative care and pulmonology.        VITAL SIGNS:      10/25/24  0500 10/26/24  0500 10/27/24  0500   Weight: 86 kg (189 lb 11.2 oz) 86 kg (189 lb 11.2 oz) 86 kg (189 lb 11.2 oz)     Body mass index is 28.01 kg/m².  Vitals:    10/27/24 0654   BP:    Pulse: 99   Resp: 20   Temp:    SpO2: 97%     PHYSICAL EXAM:  General: Comfortable,awake, alert, oriented to self, place, and time, chronically ill-appearing,  No respiratory distress.  Patient is dysphonic   Skin:  Skin is warm and dry. No rash noted. No pallor.    HENT: Warm face with multiple superficial ulcers from previous radiation therapy, no oral thrush head:  Normocephalic and atraumatic.  Mouth:  Moist mucous membranes.    Eyes:  Conjunctivae and EOM are normal.  Pupils are equal, round, and reactive to light.  No scleral icterus.    Neck:  Neck supple.  No JVD present.  No bruit  Pulmonary/Chest: Creased breath sounds on the left with scattered rhonchi and crackles, no wheezing at this time, good air movement.   .  Cardiovascular:  Normal rate, regular rhythm and normal heart sounds with no murmur.  Abdominal:  Soft.  Bowel sounds are normal.  No distension and no tenderness.   Extremities:  No edema, no tenderness, and no deformity.  No red or swollen joints anywhere.  Strong pulses in all 4 extremities with no clubbing, no cyanosis, no edema.  Neurological:  Motor strength equal no obvious deficit, sensory grossly intact.   No cranial nerve deficit.  No tongue deviation.  No facial droop.  No slurred speech.    Genitourinary: No Santana catheter  Back:  ----------    DISCHARGE MEDICATIONS:     Discharge Medications        New Medications        Instructions Start Date   benzonatate 100 MG capsule  Commonly known as: TESSALON   100 mg, Oral, 3 Times Daily PRN      guaifenesin 100 MG/5ML liquid  Commonly known as: ROBITUSSIN   200 mg, Oral, Every 4 Hours PRN      Insulin Lispro 100 UNIT/ML injection  Commonly known as: humaLOG   2-7 Units, Subcutaneous, 4 Times Daily Before Meals & Nightly      ipratropium 0.02 % nebulizer solution  Commonly known as: ATROVENT   0.5 mg, Nebulization, Every 6 Hours PRN      ipratropium-albuterol 0.5-2.5 mg/3 ml nebulizer  Commonly known as: DUO-NEB   3 mL, Nebulization, 3 Times Daily - RT      megestrol 40 MG/ML suspension  Commonly known as: MEGACE   400 mg, Oral, Daily   Start Date: October 28, 2024            Continue These Medications        Instructions Start Date   apixaban 5 MG tablet tablet  Commonly known as: ELIQUIS   5 mg, 2 Times Daily      ascorbic acid 250 MG tablet  Commonly known as: VITAMIN C   250 mg, Daily      atorvastatin 40 MG tablet  Commonly known as: LIPITOR   20 mg, Nightly      empagliflozin 25 MG tablet tablet  Commonly known as: JARDIANCE   12.5 mg, Daily      ferrous gluconate 324 MG tablet  Commonly known as: FERGON   324 mg, Daily With Breakfast      folic acid 1 MG tablet  Commonly known as: FOLVITE   1 mg, Daily      furosemide 40 MG tablet  Commonly known as:  LASIX   40 mg, Daily      Melatonin 3 MG capsule   6 mg, Oral, Nightly PRN      metoprolol succinate  MG 24 hr tablet  Commonly known as: TOPROL-XL   50 mg, Daily      MiraLax Mix-In [place] 17 g packet  Generic drug: polyethylene glycol   17 g, Oral, Daily PRN      omeprazole 20 MG capsule  Commonly known as: priLOSEC   20 mg, Daily      polyvinyl alcohol 1.4 % ophthalmic solution  Commonly known as: LIQUIFILM   1 drop, Both Eyes, 3 Times Daily PRN      psyllium 58.6 % packet  Commonly known as: METAMUCIL   1 packet, Oral, Daily PRN      senna 8.6 MG tablet  Commonly known as: SENOKOT   2 tablets, Oral, Daily PRN      sertraline 100 MG tablet  Commonly known as: ZOLOFT   100 mg, Daily             Stop These Medications      glipizide 10 MG tablet  Commonly known as: GLUCOTROL     levoFLOXacin 500 MG tablet  Commonly known as: LEVAQUIN     lisinopril 10 MG tablet  Commonly known as: PRINIVIL,ZESTRIL     metFORMIN 1000 MG tablet  Commonly known as: GLUCOPHAGE     sildenafil 100 MG tablet  Commonly known as: VIAGRA              Diet Instructions    Regular diet     1000 ml fluid restriction           Activity Instructions    As tolerated            Follow-up Information       Skyla Faustin MD .    Specialty: General Practice  Contact information:  [facility] [address] [phone]                             Pending Labs       Order Current Status    Blood Culture - Blood, Arm, Left Preliminary result    Blood Culture - Blood, Arm, Right Preliminary result             Nurys Campos MD  10/27/24  13:35 EDT    Please note that this discharge summary required more than 30 minutes to complete.

## 2024-10-27 NOTE — NURSING NOTE
Report called to [facility] on patient he is being transferred to room 122 spoke to Zaynab HENRIQUEZ

## 2024-10-27 NOTE — PLAN OF CARE
Goal Outcome Evaluation:       Patient being transferred to [facility] today

## 2024-10-27 NOTE — NURSING NOTE
called for bed assignment for patient. Patient will be going to [facility], 11th floor, room 122.     Called [facility] and stated we do not have [facility] available tonight or in the AM. Called [facility] and stated they would not be able to take patient tonight, to call back after shift change in the AM.

## 2024-10-27 NOTE — NURSING NOTE
Call placed to [facility] for transfer to , they state to call back after 7pm and they will determine if they can take patient.    Call placed to [facility], will return call       [facility] in [place] called regarding transfer to ,  states they dont have enough staff for transport      Call placed to [facility], they don't have enough staff for transport today.    [facility] called regarding patient transfer to ,  they are unable to transport patient       Call placed to [facility],  they returned call and state they are trying to find a crew that  can transport and will return call.

## 2024-10-29 NOTE — PAYOR COMM NOTE
"CONTACT: AYAAN NAIK RN  UTILIZATION MANAGEMENT DEPT.  [facility]  [address] [id number]  PHONE: [phone]  FAX: [phone]        DISCHARGE NOTIFICATION  DC DATE: 10/27/24 TO [facility]ORT TERM HOSP[facility]    REF#[id number]         Albin Andrade (84 y.o. Male)       Date of Birth   1940    Social Security Number       Address   [id number]    Home Phone   [phone]    MRN   [id number]       Rastafarian   None    Marital Status   Single                            Admission Date   10/22/24    Admission Type   Emergency    Admitting Provider   Dakota Bruce MD    Attending Provider       Department, Room/Bed   [place] [id number]/       Discharge Date   10/27/2024    Discharge Disposition   [facility] ([place] - External)    Discharge Destination                                 Attending Provider: (none)   Allergies: No Known Allergies    Isolation: None   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)   Code Status: Prior    Ht: 175.3 cm (69\")   Wt: 86 kg (189 lb 11.2 oz)    Admission Cmt: None   Principal Problem: Inability to walk [R26.2]                   Active Insurance as of 10/20/2024       Primary Coverage       Payor Plan Insurance Group Employer/Plan Group    [employer] [facility] [employer]        Payor Plan Address Payor Plan Phone Number Payor Plan Fax Number Effective Dates    [address] [phone]  2022 - None Entered    [facility] [id number]         Subscriber Name Subscriber Birth Date Member ID       ALBIN ANDRADE 1940 [id number]                     Emergency Contacts        (Rel.) Home Phone Work Phone Mobile Phone    PERRY ANDRADE (Daughter) [phone] -- --                 Discharge Summary        Nurys Campos MD at 10/27/24 1334              [facility] HOSPITALIST MEDICINE DISCHARGE SUMMARY    Patient Identification:  Name:  Albin Andrade  Age:  84 y.o.  Sex:  male  :  " 1940  MRN:  [id number]  Visit Number:  [id number]    Date of Admission: 10/20/2024  Date of Discharge: October 27, 2024  DISCHARGE DISPOSITION   Stable  PCP: Skyla Faustin MD    DISCHARGE DIAGNOSIS : Debility secondary to lung cancer, history of lung cancer non-small cell squamous type.  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    HOSPITAL COURSE  Patient is a 84 y.o. male presented to [facility] complaining of generalized weakness poor appetite, patient has squamous cell carcinoma of the lungs who is being followed by [facility] cancer clinic.  He is scheduled to have radiation treatment but because of his debility generalized weakness and poor appetite he presented emergency room.  Workup included CT scan of the brain negative for stroke, CT of the chest revealed dense consolidation superior segment of the left lower lobe with central cavitation concerning for pneumonia, there is also large mediastinal lymph node, small right pleural effusion.  Left pleural effusion.  He was admitted empirically for possible pneumonia, cultures were nonyielding, he remains afebrile, he is very debilitated, on further evaluation patient noted to be dysphonic, he stated that he has been going on for approximately 3 months, special for aspiration was entertained which was confirmed by speech therapy evaluation.  As for the reason for his dysphonia, ENT is not available at our facility, we did manage to visualize his vocal cord with fiberoptic camera from speech therapy and confirmed suspicion of left  vocal cord paralysis.  Because of dysphonia is unclear but suspected related to cancer of his lungs although radiation can also potentially be the cause but based on history most likely his cancer.  During his stay appetite has been very poor, his fluid consistency was changed to nectar thickened as recommended by speech therapy, anorexia was addressed with trial of Megace.  Despite antibiotic treatment, neb treatment, Mucomyst and chest percussion, patient has difficulty expectorating and clearing his congestion.  Case was discussed with [facility] cancer clinic at [facility] Dr. Ovalles, he recommended the patient if he can be transferred and hopefully get his radiation treatment.  [facility] transfer service was contacted and agreed to accept the patient for specialty access including ENT, radiation oncology, palliative care and pulmonology.        VITAL SIGNS:      10/25/24  0500 10/26/24  0500 10/27/24  0500   Weight: 86 kg (189 lb 11.2 oz) 86 kg (189 lb 11.2 oz) 86 kg (189 lb 11.2 oz)     Body mass index is 28.01 kg/m².  Vitals:    10/27/24 0654   BP:    Pulse: 99   Resp: 20   Temp:    SpO2: 97%     PHYSICAL EXAM:  General: Comfortable,awake, alert, oriented to self, place, and time, chronically ill-appearing,  No respiratory distress.  Patient is dysphonic   Skin:  Skin is warm and dry. No rash noted. No pallor.    HENT: Warm face with multiple superficial ulcers from previous radiation therapy, no oral thrush head:  Normocephalic and atraumatic.  Mouth:  Moist mucous membranes.    Eyes:  Conjunctivae and EOM are normal.  Pupils are equal, round, and reactive to light.  No scleral icterus.    Neck:  Neck supple.  No JVD present.  No bruit  Pulmonary/Chest: Creased breath sounds on the left with scattered rhonchi and crackles, no wheezing at this time, good air movement.  .  Cardiovascular:  Normal rate, regular rhythm and normal heart sounds with no murmur.  Abdominal:  Soft.  Bowel sounds are normal.  No distension and  no tenderness.   Extremities:  No edema, no tenderness, and no deformity.  No red or swollen joints anywhere.  Strong pulses in all 4 extremities with no clubbing, no cyanosis, no edema.  Neurological:  Motor strength equal no obvious deficit, sensory grossly intact.   No cranial nerve deficit.  No tongue deviation.  No facial droop.  No slurred speech.    Genitourinary: No Santana catheter  Back:  ----------    DISCHARGE MEDICATIONS:     Discharge Medications        New Medications        Instructions Start Date   benzonatate 100 MG capsule  Commonly known as: TESSALON   100 mg, Oral, 3 Times Daily PRN      guaifenesin 100 MG/5ML liquid  Commonly known as: ROBITUSSIN   200 mg, Oral, Every 4 Hours PRN      Insulin Lispro 100 UNIT/ML injection  Commonly known as: humaLOG   2-7 Units, Subcutaneous, 4 Times Daily Before Meals & Nightly      ipratropium 0.02 % nebulizer solution  Commonly known as: ATROVENT   0.5 mg, Nebulization, Every 6 Hours PRN      ipratropium-albuterol 0.5-2.5 mg/3 ml nebulizer  Commonly known as: DUO-NEB   3 mL, Nebulization, 3 Times Daily - RT      megestrol 40 MG/ML suspension  Commonly known as: MEGACE   400 mg, Oral, Daily   Start Date: October 28, 2024            Continue These Medications        Instructions Start Date   apixaban 5 MG tablet tablet  Commonly known as: ELIQUIS   5 mg, 2 Times Daily      ascorbic acid 250 MG tablet  Commonly known as: VITAMIN C   250 mg, Daily      atorvastatin 40 MG tablet  Commonly known as: LIPITOR   20 mg, Nightly      empagliflozin 25 MG tablet tablet  Commonly known as: JARDIANCE   12.5 mg, Daily      ferrous gluconate 324 MG tablet  Commonly known as: FERGON   324 mg, Daily With Breakfast      folic acid 1 MG tablet  Commonly known as: FOLVITE   1 mg, Daily      furosemide 40 MG tablet  Commonly known as: LASIX   40 mg, Daily      Melatonin 3 MG capsule   6 mg, Oral, Nightly PRN      metoprolol succinate  MG 24 hr tablet  Commonly known as:  TOPROL-XL   50 mg, Daily      MiraLax Mix-In [place] 17 g packet  Generic drug: polyethylene glycol   17 g, Oral, Daily PRN      omeprazole 20 MG capsule  Commonly known as: priLOSEC   20 mg, Daily      polyvinyl alcohol 1.4 % ophthalmic solution  Commonly known as: LIQUIFILM   1 drop, Both Eyes, 3 Times Daily PRN      psyllium 58.6 % packet  Commonly known as: METAMUCIL   1 packet, Oral, Daily PRN      senna 8.6 MG tablet  Commonly known as: SENOKOT   2 tablets, Oral, Daily PRN      sertraline 100 MG tablet  Commonly known as: ZOLOFT   100 mg, Daily             Stop These Medications      glipizide 10 MG tablet  Commonly known as: GLUCOTROL     levoFLOXacin 500 MG tablet  Commonly known as: LEVAQUIN     lisinopril 10 MG tablet  Commonly known as: PRINIVIL,ZESTRIL     metFORMIN 1000 MG tablet  Commonly known as: GLUCOPHAGE     sildenafil 100 MG tablet  Commonly known as: VIAGRA              Diet Instructions    Regular diet     1000 ml fluid restriction           Activity Instructions    As tolerated            Follow-up Information       Skyla Faustin MD .    Specialty: General Practice  Contact information:  [address] [place], [place] [id number] [phone]                             Pending Labs       Order Current Status    Blood Culture - Blood, Arm, Left Preliminary result    Blood Culture - Blood, Arm, Right Preliminary result             Eugenio Campos MD  10/27/24  13:35 EDT    Please note that this discharge summary required more than 30 minutes to complete.      Electronically signed by Eugenio Campos MD at 10/27/24 1344       Discharge Order (From admission, onward)       Start     Ordered    10/27/24 0839  Discharge patient  Once        Expected Discharge Date: 10/27/24   Discharge Disposition: [facility] Term Hospital ([place] - External)   Physician of Record for Attribution - Please select from Treatment Team: EUGENIO CAMPOS [[id number]]   Review needed by CMO to determine Physician of  Record: No      Question Answer Comment   Physician of Record for Attribution - Please select from Treatment Team EUGENIO CARRASCO    Review needed by CMO to determine Physician of Record No        10/27/24 2679